# Patient Record
Sex: FEMALE | Race: WHITE | NOT HISPANIC OR LATINO | URBAN - METROPOLITAN AREA
[De-identification: names, ages, dates, MRNs, and addresses within clinical notes are randomized per-mention and may not be internally consistent; named-entity substitution may affect disease eponyms.]

---

## 2024-01-11 ENCOUNTER — INPATIENT (INPATIENT)
Facility: HOSPITAL | Age: 74
LOS: 4 days | Discharge: HOME | DRG: 71 | End: 2024-01-16
Attending: STUDENT IN AN ORGANIZED HEALTH CARE EDUCATION/TRAINING PROGRAM | Admitting: STUDENT IN AN ORGANIZED HEALTH CARE EDUCATION/TRAINING PROGRAM
Payer: MEDICARE

## 2024-01-11 VITALS — DIASTOLIC BLOOD PRESSURE: 100 MMHG | SYSTOLIC BLOOD PRESSURE: 140 MMHG | HEART RATE: 98 BPM | RESPIRATION RATE: 22 BRPM

## 2024-01-11 DIAGNOSIS — R41.82 ALTERED MENTAL STATUS, UNSPECIFIED: ICD-10-CM

## 2024-01-11 LAB
ACANTHOCYTES BLD QL SMEAR: SLIGHT — SIGNIFICANT CHANGE UP
ACANTHOCYTES BLD QL SMEAR: SLIGHT — SIGNIFICANT CHANGE UP
ALBUMIN SERPL ELPH-MCNC: 4.4 G/DL — SIGNIFICANT CHANGE UP (ref 3.3–5.2)
ALBUMIN SERPL ELPH-MCNC: 4.4 G/DL — SIGNIFICANT CHANGE UP (ref 3.3–5.2)
ALP SERPL-CCNC: 93 U/L — SIGNIFICANT CHANGE UP (ref 40–120)
ALP SERPL-CCNC: 93 U/L — SIGNIFICANT CHANGE UP (ref 40–120)
ALT FLD-CCNC: 16 U/L — SIGNIFICANT CHANGE UP
ALT FLD-CCNC: 16 U/L — SIGNIFICANT CHANGE UP
ANION GAP SERPL CALC-SCNC: 16 MMOL/L — SIGNIFICANT CHANGE UP (ref 5–17)
ANION GAP SERPL CALC-SCNC: 16 MMOL/L — SIGNIFICANT CHANGE UP (ref 5–17)
ANISOCYTOSIS BLD QL: SLIGHT — SIGNIFICANT CHANGE UP
ANISOCYTOSIS BLD QL: SLIGHT — SIGNIFICANT CHANGE UP
APPEARANCE UR: CLEAR — SIGNIFICANT CHANGE UP
APPEARANCE UR: CLEAR — SIGNIFICANT CHANGE UP
APTT BLD: 22.5 SEC — LOW (ref 24.5–35.6)
APTT BLD: 22.5 SEC — LOW (ref 24.5–35.6)
AST SERPL-CCNC: 19 U/L — SIGNIFICANT CHANGE UP
AST SERPL-CCNC: 19 U/L — SIGNIFICANT CHANGE UP
BACTERIA # UR AUTO: NEGATIVE /HPF — SIGNIFICANT CHANGE UP
BACTERIA # UR AUTO: NEGATIVE /HPF — SIGNIFICANT CHANGE UP
BASOPHILS # BLD AUTO: 0 K/UL — SIGNIFICANT CHANGE UP (ref 0–0.2)
BASOPHILS # BLD AUTO: 0 K/UL — SIGNIFICANT CHANGE UP (ref 0–0.2)
BASOPHILS # BLD AUTO: 0.02 K/UL — SIGNIFICANT CHANGE UP (ref 0–0.2)
BASOPHILS # BLD AUTO: 0.02 K/UL — SIGNIFICANT CHANGE UP (ref 0–0.2)
BASOPHILS NFR BLD AUTO: 0 % — SIGNIFICANT CHANGE UP (ref 0–2)
BASOPHILS NFR BLD AUTO: 0 % — SIGNIFICANT CHANGE UP (ref 0–2)
BASOPHILS NFR BLD AUTO: 0.2 % — SIGNIFICANT CHANGE UP (ref 0–2)
BASOPHILS NFR BLD AUTO: 0.2 % — SIGNIFICANT CHANGE UP (ref 0–2)
BILIRUB SERPL-MCNC: 0.3 MG/DL — LOW (ref 0.4–2)
BILIRUB SERPL-MCNC: 0.3 MG/DL — LOW (ref 0.4–2)
BILIRUB UR-MCNC: NEGATIVE — SIGNIFICANT CHANGE UP
BILIRUB UR-MCNC: NEGATIVE — SIGNIFICANT CHANGE UP
BLD GP AB SCN SERPL QL: SIGNIFICANT CHANGE UP
BLD GP AB SCN SERPL QL: SIGNIFICANT CHANGE UP
BUN SERPL-MCNC: 11.3 MG/DL — SIGNIFICANT CHANGE UP (ref 8–20)
BUN SERPL-MCNC: 11.3 MG/DL — SIGNIFICANT CHANGE UP (ref 8–20)
CALCIUM SERPL-MCNC: 9.7 MG/DL — SIGNIFICANT CHANGE UP (ref 8.4–10.5)
CALCIUM SERPL-MCNC: 9.7 MG/DL — SIGNIFICANT CHANGE UP (ref 8.4–10.5)
CAST: 0 /LPF — SIGNIFICANT CHANGE UP (ref 0–4)
CAST: 0 /LPF — SIGNIFICANT CHANGE UP (ref 0–4)
CHLORIDE SERPL-SCNC: 98 MMOL/L — SIGNIFICANT CHANGE UP (ref 96–108)
CHLORIDE SERPL-SCNC: 98 MMOL/L — SIGNIFICANT CHANGE UP (ref 96–108)
CK MB CFR SERPL CALC: 11.8 NG/ML — HIGH (ref 0–6.7)
CK MB CFR SERPL CALC: 11.8 NG/ML — HIGH (ref 0–6.7)
CK SERPL-CCNC: 208 U/L — HIGH (ref 25–170)
CK SERPL-CCNC: 208 U/L — HIGH (ref 25–170)
CO2 SERPL-SCNC: 21 MMOL/L — LOW (ref 22–29)
CO2 SERPL-SCNC: 21 MMOL/L — LOW (ref 22–29)
COLOR SPEC: YELLOW — SIGNIFICANT CHANGE UP
COLOR SPEC: YELLOW — SIGNIFICANT CHANGE UP
CREAT SERPL-MCNC: 0.65 MG/DL — SIGNIFICANT CHANGE UP (ref 0.5–1.3)
CREAT SERPL-MCNC: 0.65 MG/DL — SIGNIFICANT CHANGE UP (ref 0.5–1.3)
DIFF PNL FLD: NEGATIVE — SIGNIFICANT CHANGE UP
DIFF PNL FLD: NEGATIVE — SIGNIFICANT CHANGE UP
EGFR: 92 ML/MIN/1.73M2 — SIGNIFICANT CHANGE UP
EGFR: 92 ML/MIN/1.73M2 — SIGNIFICANT CHANGE UP
EOSINOPHIL # BLD AUTO: 0 K/UL — SIGNIFICANT CHANGE UP (ref 0–0.5)
EOSINOPHIL NFR BLD AUTO: 0 % — SIGNIFICANT CHANGE UP (ref 0–6)
ETHANOL SERPL-MCNC: <10 MG/DL — SIGNIFICANT CHANGE UP (ref 0–9)
ETHANOL SERPL-MCNC: <10 MG/DL — SIGNIFICANT CHANGE UP (ref 0–9)
GLUCOSE BLDC GLUCOMTR-MCNC: 113 MG/DL — HIGH (ref 70–99)
GLUCOSE BLDC GLUCOMTR-MCNC: 113 MG/DL — HIGH (ref 70–99)
GLUCOSE SERPL-MCNC: 203 MG/DL — HIGH (ref 70–99)
GLUCOSE SERPL-MCNC: 203 MG/DL — HIGH (ref 70–99)
GLUCOSE UR QL: >=1000 MG/DL
GLUCOSE UR QL: >=1000 MG/DL
HCT VFR BLD CALC: 33.5 % — LOW (ref 34.5–45)
HCT VFR BLD CALC: 33.5 % — LOW (ref 34.5–45)
HCT VFR BLD CALC: 34.2 % — LOW (ref 34.5–45)
HCT VFR BLD CALC: 34.2 % — LOW (ref 34.5–45)
HGB BLD-MCNC: 10.1 G/DL — LOW (ref 11.5–15.5)
HGB BLD-MCNC: 10.1 G/DL — LOW (ref 11.5–15.5)
HGB BLD-MCNC: 9.7 G/DL — LOW (ref 11.5–15.5)
HGB BLD-MCNC: 9.7 G/DL — LOW (ref 11.5–15.5)
IMM GRANULOCYTES NFR BLD AUTO: 0.4 % — SIGNIFICANT CHANGE UP (ref 0–0.9)
IMM GRANULOCYTES NFR BLD AUTO: 0.4 % — SIGNIFICANT CHANGE UP (ref 0–0.9)
INR BLD: 0.93 RATIO — SIGNIFICANT CHANGE UP (ref 0.85–1.18)
INR BLD: 0.93 RATIO — SIGNIFICANT CHANGE UP (ref 0.85–1.18)
KETONES UR-MCNC: 40 MG/DL
KETONES UR-MCNC: 40 MG/DL
LACTATE BLDV-MCNC: 2 MMOL/L — SIGNIFICANT CHANGE UP (ref 0.5–2)
LACTATE BLDV-MCNC: 2 MMOL/L — SIGNIFICANT CHANGE UP (ref 0.5–2)
LACTATE BLDV-MCNC: 5.6 MMOL/L — CRITICAL HIGH (ref 0.5–2)
LACTATE BLDV-MCNC: 5.6 MMOL/L — CRITICAL HIGH (ref 0.5–2)
LEUKOCYTE ESTERASE UR-ACNC: NEGATIVE — SIGNIFICANT CHANGE UP
LEUKOCYTE ESTERASE UR-ACNC: NEGATIVE — SIGNIFICANT CHANGE UP
LYMPHOCYTES # BLD AUTO: 0.82 K/UL — LOW (ref 1–3.3)
LYMPHOCYTES # BLD AUTO: 0.82 K/UL — LOW (ref 1–3.3)
LYMPHOCYTES # BLD AUTO: 1.25 K/UL — SIGNIFICANT CHANGE UP (ref 1–3.3)
LYMPHOCYTES # BLD AUTO: 1.25 K/UL — SIGNIFICANT CHANGE UP (ref 1–3.3)
LYMPHOCYTES # BLD AUTO: 12.4 % — LOW (ref 13–44)
LYMPHOCYTES # BLD AUTO: 12.4 % — LOW (ref 13–44)
LYMPHOCYTES # BLD AUTO: 9.9 % — LOW (ref 13–44)
LYMPHOCYTES # BLD AUTO: 9.9 % — LOW (ref 13–44)
MANUAL SMEAR VERIFICATION: SIGNIFICANT CHANGE UP
MANUAL SMEAR VERIFICATION: SIGNIFICANT CHANGE UP
MCHC RBC-ENTMCNC: 20.7 PG — LOW (ref 27–34)
MCHC RBC-ENTMCNC: 20.7 PG — LOW (ref 27–34)
MCHC RBC-ENTMCNC: 21.1 PG — LOW (ref 27–34)
MCHC RBC-ENTMCNC: 21.1 PG — LOW (ref 27–34)
MCHC RBC-ENTMCNC: 29 GM/DL — LOW (ref 32–36)
MCHC RBC-ENTMCNC: 29 GM/DL — LOW (ref 32–36)
MCHC RBC-ENTMCNC: 29.5 GM/DL — LOW (ref 32–36)
MCHC RBC-ENTMCNC: 29.5 GM/DL — LOW (ref 32–36)
MCV RBC AUTO: 71.4 FL — LOW (ref 80–100)
MICROCYTES BLD QL: SLIGHT — SIGNIFICANT CHANGE UP
MICROCYTES BLD QL: SLIGHT — SIGNIFICANT CHANGE UP
MONOCYTES # BLD AUTO: 0.18 K/UL — SIGNIFICANT CHANGE UP (ref 0–0.9)
MONOCYTES # BLD AUTO: 0.18 K/UL — SIGNIFICANT CHANGE UP (ref 0–0.9)
MONOCYTES # BLD AUTO: 0.46 K/UL — SIGNIFICANT CHANGE UP (ref 0–0.9)
MONOCYTES # BLD AUTO: 0.46 K/UL — SIGNIFICANT CHANGE UP (ref 0–0.9)
MONOCYTES NFR BLD AUTO: 1.8 % — LOW (ref 2–14)
MONOCYTES NFR BLD AUTO: 1.8 % — LOW (ref 2–14)
MONOCYTES NFR BLD AUTO: 5.5 % — SIGNIFICANT CHANGE UP (ref 2–14)
MONOCYTES NFR BLD AUTO: 5.5 % — SIGNIFICANT CHANGE UP (ref 2–14)
NEUTROPHILS # BLD AUTO: 6.99 K/UL — SIGNIFICANT CHANGE UP (ref 1.8–7.4)
NEUTROPHILS # BLD AUTO: 6.99 K/UL — SIGNIFICANT CHANGE UP (ref 1.8–7.4)
NEUTROPHILS # BLD AUTO: 8.21 K/UL — HIGH (ref 1.8–7.4)
NEUTROPHILS # BLD AUTO: 8.21 K/UL — HIGH (ref 1.8–7.4)
NEUTROPHILS NFR BLD AUTO: 81.4 % — HIGH (ref 43–77)
NEUTROPHILS NFR BLD AUTO: 81.4 % — HIGH (ref 43–77)
NEUTROPHILS NFR BLD AUTO: 84 % — HIGH (ref 43–77)
NEUTROPHILS NFR BLD AUTO: 84 % — HIGH (ref 43–77)
NITRITE UR-MCNC: NEGATIVE — SIGNIFICANT CHANGE UP
NITRITE UR-MCNC: NEGATIVE — SIGNIFICANT CHANGE UP
OVALOCYTES BLD QL SMEAR: SLIGHT — SIGNIFICANT CHANGE UP
OVALOCYTES BLD QL SMEAR: SLIGHT — SIGNIFICANT CHANGE UP
PH UR: 5.5 — SIGNIFICANT CHANGE UP (ref 5–8)
PH UR: 5.5 — SIGNIFICANT CHANGE UP (ref 5–8)
PLAT MORPH BLD: NORMAL — SIGNIFICANT CHANGE UP
PLAT MORPH BLD: NORMAL — SIGNIFICANT CHANGE UP
PLATELET # BLD AUTO: 255 K/UL — SIGNIFICANT CHANGE UP (ref 150–400)
PLATELET # BLD AUTO: 255 K/UL — SIGNIFICANT CHANGE UP (ref 150–400)
PLATELET # BLD AUTO: 299 K/UL — SIGNIFICANT CHANGE UP (ref 150–400)
PLATELET # BLD AUTO: 299 K/UL — SIGNIFICANT CHANGE UP (ref 150–400)
POIKILOCYTOSIS BLD QL AUTO: SLIGHT — SIGNIFICANT CHANGE UP
POIKILOCYTOSIS BLD QL AUTO: SLIGHT — SIGNIFICANT CHANGE UP
POLYCHROMASIA BLD QL SMEAR: SLIGHT — SIGNIFICANT CHANGE UP
POLYCHROMASIA BLD QL SMEAR: SLIGHT — SIGNIFICANT CHANGE UP
POTASSIUM SERPL-MCNC: 3.6 MMOL/L — SIGNIFICANT CHANGE UP (ref 3.5–5.3)
POTASSIUM SERPL-MCNC: 3.6 MMOL/L — SIGNIFICANT CHANGE UP (ref 3.5–5.3)
POTASSIUM SERPL-SCNC: 3.6 MMOL/L — SIGNIFICANT CHANGE UP (ref 3.5–5.3)
POTASSIUM SERPL-SCNC: 3.6 MMOL/L — SIGNIFICANT CHANGE UP (ref 3.5–5.3)
PROT SERPL-MCNC: 6.7 G/DL — SIGNIFICANT CHANGE UP (ref 6.6–8.7)
PROT SERPL-MCNC: 6.7 G/DL — SIGNIFICANT CHANGE UP (ref 6.6–8.7)
PROT UR-MCNC: NEGATIVE MG/DL — SIGNIFICANT CHANGE UP
PROT UR-MCNC: NEGATIVE MG/DL — SIGNIFICANT CHANGE UP
PROTHROM AB SERPL-ACNC: 10.3 SEC — SIGNIFICANT CHANGE UP (ref 9.5–13)
PROTHROM AB SERPL-ACNC: 10.3 SEC — SIGNIFICANT CHANGE UP (ref 9.5–13)
RBC # BLD: 4.69 M/UL — SIGNIFICANT CHANGE UP (ref 3.8–5.2)
RBC # BLD: 4.69 M/UL — SIGNIFICANT CHANGE UP (ref 3.8–5.2)
RBC # BLD: 4.79 M/UL — SIGNIFICANT CHANGE UP (ref 3.8–5.2)
RBC # BLD: 4.79 M/UL — SIGNIFICANT CHANGE UP (ref 3.8–5.2)
RBC # FLD: 18.9 % — HIGH (ref 10.3–14.5)
RBC BLD AUTO: ABNORMAL
RBC BLD AUTO: ABNORMAL
RBC CASTS # UR COMP ASSIST: 0 /HPF — SIGNIFICANT CHANGE UP (ref 0–4)
RBC CASTS # UR COMP ASSIST: 0 /HPF — SIGNIFICANT CHANGE UP (ref 0–4)
SODIUM SERPL-SCNC: 135 MMOL/L — SIGNIFICANT CHANGE UP (ref 135–145)
SODIUM SERPL-SCNC: 135 MMOL/L — SIGNIFICANT CHANGE UP (ref 135–145)
SP GR SPEC: >1.03 — HIGH (ref 1–1.03)
SP GR SPEC: >1.03 — HIGH (ref 1–1.03)
SQUAMOUS # UR AUTO: 0 /HPF — SIGNIFICANT CHANGE UP (ref 0–5)
SQUAMOUS # UR AUTO: 0 /HPF — SIGNIFICANT CHANGE UP (ref 0–5)
TROPONIN T, HIGH SENSITIVITY RESULT: 14 NG/L — SIGNIFICANT CHANGE UP (ref 0–51)
TROPONIN T, HIGH SENSITIVITY RESULT: 14 NG/L — SIGNIFICANT CHANGE UP (ref 0–51)
TROPONIN T, HIGH SENSITIVITY RESULT: 16 NG/L — SIGNIFICANT CHANGE UP (ref 0–51)
TROPONIN T, HIGH SENSITIVITY RESULT: 16 NG/L — SIGNIFICANT CHANGE UP (ref 0–51)
UROBILINOGEN FLD QL: 0.2 MG/DL — SIGNIFICANT CHANGE UP (ref 0.2–1)
UROBILINOGEN FLD QL: 0.2 MG/DL — SIGNIFICANT CHANGE UP (ref 0.2–1)
VARIANT LYMPHS # BLD: 4.4 % — SIGNIFICANT CHANGE UP (ref 0–6)
VARIANT LYMPHS # BLD: 4.4 % — SIGNIFICANT CHANGE UP (ref 0–6)
WBC # BLD: 10.08 K/UL — SIGNIFICANT CHANGE UP (ref 3.8–10.5)
WBC # BLD: 10.08 K/UL — SIGNIFICANT CHANGE UP (ref 3.8–10.5)
WBC # BLD: 8.32 K/UL — SIGNIFICANT CHANGE UP (ref 3.8–10.5)
WBC # BLD: 8.32 K/UL — SIGNIFICANT CHANGE UP (ref 3.8–10.5)
WBC # FLD AUTO: 10.08 K/UL — SIGNIFICANT CHANGE UP (ref 3.8–10.5)
WBC # FLD AUTO: 10.08 K/UL — SIGNIFICANT CHANGE UP (ref 3.8–10.5)
WBC # FLD AUTO: 8.32 K/UL — SIGNIFICANT CHANGE UP (ref 3.8–10.5)
WBC # FLD AUTO: 8.32 K/UL — SIGNIFICANT CHANGE UP (ref 3.8–10.5)
WBC UR QL: 1 /HPF — SIGNIFICANT CHANGE UP (ref 0–5)
WBC UR QL: 1 /HPF — SIGNIFICANT CHANGE UP (ref 0–5)

## 2024-01-11 PROCEDURE — 99223 1ST HOSP IP/OBS HIGH 75: CPT

## 2024-01-11 PROCEDURE — 70496 CT ANGIOGRAPHY HEAD: CPT | Mod: 26,MA

## 2024-01-11 PROCEDURE — 93306 TTE W/DOPPLER COMPLETE: CPT | Mod: 26

## 2024-01-11 PROCEDURE — 70498 CT ANGIOGRAPHY NECK: CPT | Mod: 26,MA

## 2024-01-11 PROCEDURE — 99285 EMERGENCY DEPT VISIT HI MDM: CPT | Mod: GC

## 2024-01-11 PROCEDURE — 70450 CT HEAD/BRAIN W/O DYE: CPT | Mod: 26,MA,XU

## 2024-01-11 PROCEDURE — 93010 ELECTROCARDIOGRAM REPORT: CPT | Mod: 76

## 2024-01-11 PROCEDURE — 0042T: CPT | Mod: MA

## 2024-01-11 RX ORDER — SODIUM CHLORIDE 9 MG/ML
1000 INJECTION, SOLUTION INTRAVENOUS
Refills: 0 | Status: DISCONTINUED | OUTPATIENT
Start: 2024-01-11 | End: 2024-01-16

## 2024-01-11 RX ORDER — DEXTROSE 50 % IN WATER 50 %
12.5 SYRINGE (ML) INTRAVENOUS ONCE
Refills: 0 | Status: DISCONTINUED | OUTPATIENT
Start: 2024-01-11 | End: 2024-01-16

## 2024-01-11 RX ORDER — ARIPIPRAZOLE 15 MG/1
30 TABLET ORAL DAILY
Refills: 0 | Status: DISCONTINUED | OUTPATIENT
Start: 2024-01-11 | End: 2024-01-16

## 2024-01-11 RX ORDER — GLUCAGON INJECTION, SOLUTION 0.5 MG/.1ML
1 INJECTION, SOLUTION SUBCUTANEOUS ONCE
Refills: 0 | Status: DISCONTINUED | OUTPATIENT
Start: 2024-01-11 | End: 2024-01-16

## 2024-01-11 RX ORDER — DEXTROSE 50 % IN WATER 50 %
25 SYRINGE (ML) INTRAVENOUS ONCE
Refills: 0 | Status: DISCONTINUED | OUTPATIENT
Start: 2024-01-11 | End: 2024-01-16

## 2024-01-11 RX ORDER — MIDAZOLAM HYDROCHLORIDE 1 MG/ML
5 INJECTION, SOLUTION INTRAMUSCULAR; INTRAVENOUS ONCE
Refills: 0 | Status: DISCONTINUED | OUTPATIENT
Start: 2024-01-11 | End: 2024-01-11

## 2024-01-11 RX ORDER — ATORVASTATIN CALCIUM 80 MG/1
40 TABLET, FILM COATED ORAL AT BEDTIME
Refills: 0 | Status: DISCONTINUED | OUTPATIENT
Start: 2024-01-11 | End: 2024-01-11

## 2024-01-11 RX ORDER — INSULIN LISPRO 100/ML
VIAL (ML) SUBCUTANEOUS
Refills: 0 | Status: DISCONTINUED | OUTPATIENT
Start: 2024-01-11 | End: 2024-01-16

## 2024-01-11 RX ORDER — AMLODIPINE BESYLATE 2.5 MG/1
5 TABLET ORAL DAILY
Refills: 0 | Status: DISCONTINUED | OUTPATIENT
Start: 2024-01-11 | End: 2024-01-16

## 2024-01-11 RX ORDER — ENOXAPARIN SODIUM 100 MG/ML
40 INJECTION SUBCUTANEOUS EVERY 24 HOURS
Refills: 0 | Status: DISCONTINUED | OUTPATIENT
Start: 2024-01-11 | End: 2024-01-16

## 2024-01-11 RX ORDER — ASPIRIN/CALCIUM CARB/MAGNESIUM 324 MG
81 TABLET ORAL DAILY
Refills: 0 | Status: DISCONTINUED | OUTPATIENT
Start: 2024-01-11 | End: 2024-01-16

## 2024-01-11 RX ORDER — DEXTROSE 50 % IN WATER 50 %
15 SYRINGE (ML) INTRAVENOUS ONCE
Refills: 0 | Status: DISCONTINUED | OUTPATIENT
Start: 2024-01-11 | End: 2024-01-16

## 2024-01-11 RX ORDER — PERPHENAZINE 8 MG/1
8 TABLET, FILM COATED ORAL AT BEDTIME
Refills: 0 | Status: DISCONTINUED | OUTPATIENT
Start: 2024-01-11 | End: 2024-01-16

## 2024-01-11 RX ORDER — ATORVASTATIN CALCIUM 80 MG/1
80 TABLET, FILM COATED ORAL AT BEDTIME
Refills: 0 | Status: DISCONTINUED | OUTPATIENT
Start: 2024-01-11 | End: 2024-01-16

## 2024-01-11 RX ADMIN — PERPHENAZINE 8 MILLIGRAM(S): 8 TABLET, FILM COATED ORAL at 22:06

## 2024-01-11 RX ADMIN — ENOXAPARIN SODIUM 40 MILLIGRAM(S): 100 INJECTION SUBCUTANEOUS at 22:05

## 2024-01-11 RX ADMIN — ATORVASTATIN CALCIUM 80 MILLIGRAM(S): 80 TABLET, FILM COATED ORAL at 22:06

## 2024-01-11 RX ADMIN — MIDAZOLAM HYDROCHLORIDE 5 MILLIGRAM(S): 1 INJECTION, SOLUTION INTRAMUSCULAR; INTRAVENOUS at 10:24

## 2024-01-11 NOTE — H&P ADULT - NSHPREVIEWOFSYSTEMS_GEN_ALL_CORE
CONSTITUTIONAL: no fevers, chills, night sweats, weight changes  HEENT: no vision changes or diplopia, no tinnitus, no sore throat  RESPIRATORY: denies dyspnea, sob, nocturnal cough, sputum or hemoptysis  CARDIOVASCULAR: no CP, palpitations or lower extremity edema  GI: no dysphagia, nausea, abd pain, constipation, diarrhea, stool change or blood in stool  : no dysuria or hematuria, no flank pain, no incontinence or urinary retention  MSK: no joint pain or swelling  INTEGUMENTARY: no rashes or lesions  NEUROLOGICAL: no HA, +confusion, +syncope, no numbness, weakness, tremors or ataxia  PSYCH: denies depression  ENDOCRINE: no polyuria, polydipsia, no temp intolerance, no tremors, no changes in skin, hair or nails  HEMATOLOGIC/LYMPHATIC: no lymph node enlargement, abnormal bruising or bleeding

## 2024-01-11 NOTE — ED ADULT TRIAGE NOTE - CHIEF COMPLAINT QUOTE
pt BIBA s/p fall, pt with garbled speech and AMS on arrival, EMS reporting pt was witnessed normal at 8:50 this morning when she suddenly became unable to speak, fell down and passed out. code stroke activated on arrival.

## 2024-01-11 NOTE — PATIENT PROFILE ADULT - FALL HARM RISK - HARM RISK INTERVENTIONS
Assistance with ambulation/Assistance OOB with selected safe patient handling equipment/Communicate Risk of Fall with Harm to all staff/Reinforce activity limits and safety measures with patient and family/Tailored Fall Risk Interventions/Visual Cue: Yellow wristband and red socks/Bed in lowest position, wheels locked, appropriate side rails in place/Call bell, personal items and telephone in reach/Instruct patient to call for assistance before getting out of bed or chair/Non-slip footwear when patient is out of bed/[place] to call system/Physically safe environment - no spills, clutter or unnecessary equipment/Purposeful Proactive Rounding/Room/bathroom lighting operational, light cord in reach Assistance with ambulation/Assistance OOB with selected safe patient handling equipment/Communicate Risk of Fall with Harm to all staff/Reinforce activity limits and safety measures with patient and family/Tailored Fall Risk Interventions/Visual Cue: Yellow wristband and red socks/Bed in lowest position, wheels locked, appropriate side rails in place/Call bell, personal items and telephone in reach/Instruct patient to call for assistance before getting out of bed or chair/Non-slip footwear when patient is out of bed/[place] to call system/Physically safe environment - no spills, clutter or unnecessary equipment/Purposeful Proactive Rounding/Room/bathroom lighting operational, light cord in reach

## 2024-01-11 NOTE — PATIENT PROFILE ADULT - FALL HARM RISK - DATE OF FALL
F: No IVF  N: DASH/TLC/DM diet  E: Replete lytes PRN K<4, Mg<2  P: DVT PPX: on Eliquis  C: FULL CODE  Dispo: Admit to tele [facility]
11-Jan-2024

## 2024-01-11 NOTE — ED PROVIDER NOTE - NSICDXPASTMEDICALHX_GEN_ALL_CORE_FT
PAST MEDICAL HISTORY:  DM (diabetes mellitus)     HLD (hyperlipidemia)     HTN (hypertension)     Manic depression

## 2024-01-11 NOTE — PATIENT PROFILE ADULT - FUNCTIONAL ASSESSMENT - BASIC MOBILITY 6.
3-calculated by average/Not able to assess (calculate score using [facility] averaging method)  3-calculated by average/Not able to assess (calculate score using [facility] averaging method)

## 2024-01-11 NOTE — ED ADULT NURSE NOTE - OBJECTIVE STATEMENT
BIBA after collapse while cooking. Pt lethargic and not following directions. Speech slurred. To CT Scan.

## 2024-01-11 NOTE — H&P ADULT - ASSESSMENT
74 y/o F w/ PMH of bipolar dx vs schizo affective disorder, HTN, HLD, DMII presented after pt collapsed in the kitchen.  She was unresponsive on the floor for a few seconds and upon awakening was noted to be confused with garbled speech.  Pt is a poor historian.  As per ED records, pts daughter reports that she has been having episodes of garbled speech for the past week.  Code stroke called in ED.  CTH negative, CTA h/n and perfusion negative.  Pt admitted for r/o CVA vs seizure.         ?Seizure vs syncope vs r/o CVA   - Given reports of possible urinary incontinence, possible post ictal state s/p collapse, lactic acidosis and +CPK will order cvEEG to assess for seizures    - Garbled speech could be due to CVA however CTH, CTA h/n and perfusion study negative   - MRI brain ordered to r/o CVA   - c/w asa, statin and f/u A1c and lipid panel   - Will order orthostats given pt was standing when she collapsed   - No prodrome to suggest cardiac syncope however pt is a poor historian so will monitor on tele and order TTE   - Seizure, fall and aspiration precautions   - Q4h VS and neuro checks   - Neuro stroke following       Microcytic anemia   - Baseline h/h unknown   - Pt hemodynamically stable and no active bleeding noted  - Will order anemia panel   - Monitor cbc and transfuse as needed       DM II  - f/u A1c for AM  - Hold po meds while hospitalized  - ISS and hypoglycemic protocol in place   - If needed add basal and or bolus insulin regimen       HTN / HLD  - c/w home meds       ?Bipolar vs Schizophrenia/affective disorder   - c/w home meds       VTE ppx: lovenox  72 y/o F w/ PMH of bipolar dx vs schizo affective disorder, HTN, HLD, DMII presented after pt collapsed in the kitchen.  She was unresponsive on the floor for a few seconds and upon awakening was noted to be confused with garbled speech.  Pt is a poor historian.  As per ED records, pts daughter reports that she has been having episodes of garbled speech for the past week.  Code stroke called in ED.  CTH negative, CTA h/n and perfusion negative.  Pt admitted for r/o CVA vs seizure.         ?Seizure vs syncope vs r/o CVA   - Given reports of possible urinary incontinence, possible post ictal state s/p collapse, lactic acidosis and +CPK will order cvEEG to assess for seizures    - Garbled speech could be due to CVA however CTH, CTA h/n and perfusion study negative   - MRI brain ordered to r/o CVA   - c/w asa, statin and f/u A1c and lipid panel   - Will order orthostats given pt was standing when she collapsed   - No prodrome to suggest cardiac syncope and trops (-) however pt is a poor historian so will monitor on tele and order TTE   - Lactic acidosis resolved w/out intervention   - Seizure, fall and aspiration precautions   - Q4h VS and neuro checks   - Neuro stroke following       Microcytic anemia   - Baseline h/h unknown   - Pt hemodynamically stable and no active bleeding noted  - Will order anemia panel   - Monitor cbc and transfuse as needed       DM II  - f/u A1c for AM  - Hold po meds while hospitalized  - ISS and hypoglycemic protocol in place   - If needed add basal and or bolus insulin regimen       HTN / HLD  - c/w home meds       ?Bipolar vs Schizophrenia/affective disorder   - c/w home meds       VTE ppx: lovenox

## 2024-01-11 NOTE — PATIENT PROFILE ADULT - LIVING ENVIRONMENT
Problem: Pain  Goal: #Acceptable pain/comfort level is achieved/maintained at rest (based on self report using Numeric Rating Scales/Faces  Outcome: Outcome Met, Continue evaluating goal progress toward completion  Pain controlled with PRN percocet.     Problem: Angina/Chest Pain  Goal: Achieves Chest Pain Control (Pain Score = 0-1, no episodes)  Chest pain control = Pain Score = 0-1, no episodes of pain   Outcome: Outcome Met, Continue evaluating goal progress toward completion  No complaints of chest pain. Chest pressure/breast pain has gradually improved throughout shift.        no

## 2024-01-11 NOTE — CONSULT NOTE ADULT - CRITICAL CARE ATTENDING COMMENT
Stroke code called, pt emergently assessed  Out of TNK window as daughter confirmed dysarthria has been ongoing for at least 1 week, concern for recent stroke vs progression of infarct  ?Unclear if R hemiparesis is new, Noted to have elevated lactate, would r/o seizure and post ictal Todds paralysis  DAPT x 60 days for ICAD  Rest of plan as above    Bren Carlin,   Vascular Neurology  Office [phone] Stroke code called, pt emergently assessed  Out of TNK window as daughter confirmed dysarthria has been ongoing for at least 1 week, concern for recent stroke vs progression of infarct  ?Unclear if R hemiparesis is new, Noted to have elevated lactate, would r/o seizure and post ictal Todds paralysis  DAPT x 60 days for ICAD  Rest of plan as above    Bren Carlin,   Vascular Neurology  Office [phone]

## 2024-01-11 NOTE — ED PROVIDER NOTE - ATTENDING CONTRIBUTION TO CARE
Pt is a 72 yo F BIBEMS from home for collapse and confusion.  Pt was at home when EMS reports that patient collapsed and [facility] called EMS. on arrival, EMS reports that patient was unresponsive and hypotensive. Pt became more alert en route and was agitated and confused.    physical - rrr. ctab. abd - soft, nt. no edema. no rash.  neuro - moving all extremities. confused. opens eyes. dysarthric confused speech.    plan - labs and imaging reviewed. Pt given IV versed for treatment of agitation to get CTs done.  code stroke on arrival. neurology team evaluated patient at bedside.  pt's mental status was improving in ED but still had mild confusion. patient's daughter reports patient's speech has been off for 1 wk.  will admit to medicine for further workup. Pt is a 74 yo F BIBEMS from home for collapse and confusion.  Pt was at home when EMS reports that patient collapsed and [facility] called EMS. on arrival, EMS reports that patient was unresponsive and hypotensive. Pt became more alert en route and was agitated and confused.    physical - rrr. ctab. abd - soft, nt. no edema. no rash.  neuro - moving all extremities. confused. opens eyes. dysarthric confused speech.    plan - labs and imaging reviewed. Pt given IV versed for treatment of agitation to get CTs done.  code stroke on arrival. neurology team evaluated patient at bedside.  pt's mental status was improving in ED but still had mild confusion. patient's daughter reports patient's speech has been off for 1 wk.  will admit to medicine for further workup.

## 2024-01-11 NOTE — ED PROVIDER NOTE - CLINICAL SUMMARY MEDICAL DECISION MAKING FREE TEXT BOX
74yo F with PMHx of DM who presents to the ED complaining of aphasia and agitation. Code stroke called. Daughter en route, will prepare for TNK once medical history and consent obtained from daughter. 72yo F with PMHx of DM who presents to the ED complaining of aphasia and agitation. Code stroke called. Daughter en route, will prepare for TNK once medical history and consent obtained from daughter.    Patient mental status slowly improving, CTs negative, further history obtained from daughter indicates she has been having similar episodes for a week. Will admit for MRI and EEG, further workup. No TNK given.

## 2024-01-11 NOTE — CHART NOTE - NSCHARTNOTEFT_GEN_A_CORE
EEG preliminary read (not final) on the initial recording hour(s) = x 1     No seizures recorded.  Final report to follow tomorrow morning after completion of study.    [facility] EEG Reading Room Ph#: [phone]  Epilepsy Answering Service after 5PM and before 8:30AM: Ph#: [phone] EEG preliminary read (not final) on the initial recording hour(s) = x 1     No seizures recorded.  Final report to follow tomorrow morning after completion of study.    [facility] EEG Reading Room Ph#: [phone]  Epilepsy Answering Service after 5PM and before 8:30AM: Ph#: [phone]

## 2024-01-11 NOTE — H&P ADULT - HISTORY OF PRESENT ILLNESS
72 y/o F w/ PMH of bipolar dx vs schizo affective disorder, HTN, HLD, DMII presented after pt collapsed in the kitchen.  She was unresponsive on the floor for a few seconds and upon awakening was noted to be confused with garbled speech.  Pt is a poor historian and does not remember what happened that caused her to come to hospital.  Pt does remember waking up on the floor of the kitchen but nothing further.  She has never had anything like this before.  As per ED records, pts daughter reports that she has been having episodes of garbled speech for the past week.  She has no substance abuse history and has not consumed etoh since 2008.  She denies prodrome of diaphoresis, palpitations, cp, sob.  Pt denies vision or hearing changes. 74 y/o F w/ PMH of bipolar dx vs schizo affective disorder, HTN, HLD, DMII presented after pt collapsed in the kitchen.  She was unresponsive on the floor for a few seconds and upon awakening was noted to be confused with garbled speech.  Pt is a poor historian and does not remember what happened that caused her to come to hospital.  Pt does remember waking up on the floor of the kitchen but nothing further.  She has never had anything like this before.  As per ED records, pts daughter reports that she has been having episodes of garbled speech for the past week.  She has no substance abuse history and has not consumed etoh since 2008.  She denies prodrome of diaphoresis, palpitations, cp, sob.  Pt denies vision or hearing changes.

## 2024-01-11 NOTE — ED ADULT TRIAGE NOTE - BP NONINVASIVE SYSTOLIC (MM HG)
[FreeTextEntry1] : Follow up Paperwork. [de-identified] :  65yo F presents for paperwork and immunity status testing. Pt reports she also started mounjaro, currently on 7.5mg and is happy with her weight loss. Pt has no negative side effects.  140

## 2024-01-11 NOTE — CONSULT NOTE ADULT - SUBJECTIVE AND OBJECTIVE BOX
Preliminary note, official note pending attending review/signature.                               [facility] Stroke Team  CC:  HPI:  The patient is a 73y Female, unknown PMHx, who presented s/p witnessed fall with garbled speech and altered mental status. Per EMS, the pt was making breakfast when she suddenly became unable to speak, fell down, ?syncopized, and the home health aide called 911. Per the home health aide, pt's LKW 0850 this morning. Pt initially presented to the ED completely aphasic w/ only moaning and no intelligible speech, but moving all extremities, and R facial droop. At the time of this exam, the pt had been given sedation due to agitation, but had neurologically improved since arrival. After speaking with the pt's daughter, it was established that the patient had had these symptoms for ~1 week.     Stroke risk factors:  Unknown    PAST MEDICAL & SURGICAL HISTORY:  SERA    MEDICATIONS  (STANDING):    MEDICATIONS  (PRN):      Allergies    Allergy Status Unknown    Intolerances        SOCIAL HISTORY:  no tob,   no alcohol   no drugs    FAMILY HISTORY:        ROS: 14 point ROS negative other than what is present in HPI or below    Vital Signs Last 24 Hrs  T(C): 35.4 (11 Jan 2024 10:28), Max: 35.4 (11 Jan 2024 10:28)  T(F): 95.8 (11 Jan 2024 10:28), Max: 95.8 (11 Jan 2024 10:28)  HR: 87 (11 Jan 2024 10:28) (87 - 98)  BP: 139/64 (11 Jan 2024 10:28) (135/65 - 140/100)  BP(mean): --  RR: 24 (11 Jan 2024 10:28) (20 - 24)  SpO2: 97% (11 Jan 2024 10:28) (97% - 100%)    Parameters below as of 11 Jan 2024 10:28  Patient On (Oxygen Delivery Method): room air          General: NAD    Detailed Neurologic Exam:    Mental status: The patient is awake and alert and has normal attention span.  The patient is fully oriented in 3 spheres. The patient is oriented to current events. The patient is able to name objects, follow commands, repeat sentences.    Cranial nerves: Pupils equal and react symmetrically to light. There is no visual field deficit to confrontation. Extraocular motion is full with no nystagmus. There is no ptosis. Facial sensation is intact. Facial musculature is symmetric. Palate elevates symmetrically. Tongue is midline.    Motor: There is normal bulk and tone.  There is no tremor.  Strength is 5/5 in the right arm and leg.   Strength is 5/5 in the left arm and leg.    Sensation: Intact to light touch and pin in 4 extremities    Reflexes: 1-2+ throughout and plantar responses are flexor.    Cerebellar: There is no dysmetria on finger to nose testing.    Gait : deferred    [facility] SS:  DATE: 1/11/24  TIME: 10:30  1A: Level of consciousness (0-3): 1  1B: Questions (0-2): 1  1C: Commands (0-2): 0  2: Gaze (0-2): 0  3: Visual fields (0-3): 0  4: Facial palsy (0-3): 1  MOTOR:  5A: Left arm motor drift (0-4): 0  5B: Right arm motor drift (0-4): 1  6A: Left leg motor drift (0-4): 1  6B: Right leg motor drift (0-4): 1  7: Limb ataxia (0-2): 0  SENSORY:  8: Sensation (0-2): 0  SPEECH:  9: Language (0-3): 0  10: Dysarthria (0-2): 1  EXTINCTION:  11: Extinction/inattention (0-2): 0    TOTAL SCORE: 7    prehospital mRS=      LABS:                         9.7    10.08 )-----------( 299      ( 11 Jan 2024 09:43 )             33.5       01-11    135  |  98  |  11.3  ----------------------------<  203<H>  3.6   |  21.0<L>  |  0.65    Ca    9.7      11 Jan 2024 09:43    TPro  6.7  /  Alb  4.4  /  TBili  0.3<L>  /  DBili  x   /  AST  19  /  ALT  16  /  AlkPhos  93  01-11      PT/INR - ( 11 Jan 2024 09:43 )   PT: 10.3 sec;   INR: 0.93 ratio         PTT - ( 11 Jan 2024 09:43 )  PTT:22.5 sec    Lipid panel:    HgbA1c:      RADIOLOGY & ADDITIONAL STUDIES (independently reviewed unless otherwise noted):  CT head:  CTA head: no aneurysm, AVM, LVO or sig stenosis in COW  CTA neck: no sig carotid or vertebral stenosis  CT Perfusion head - CBF<30% volume 0ml, Tmax>6s volume =0ml Preliminary note, official note pending attending review/signature.                               [facility] Stroke Team  CC:  HPI:  The patient is a 73y Female, unknown PMHx, who presented s/p witnessed fall with garbled speech and altered mental status. Per EMS, the pt was making breakfast when she suddenly became unable to speak, fell down, ?syncopized, and the home health aide called 911. Per the home health aide, pt's LKW 0850 this morning. Pt initially presented to the ED completely aphasic w/ only moaning and no intelligible speech, but moving all extremities, and R facial droop. At the time of this exam, the pt had been given sedation due to agitation, but had neurologically improved since arrival. After speaking with the pt's daughter, it was established that the patient had had these symptoms for ~1 week.     Stroke risk factors:  Unknown    PAST MEDICAL & SURGICAL HISTORY:  SERA    MEDICATIONS  (STANDING):    MEDICATIONS  (PRN):      Allergies    Allergy Status Unknown    Intolerances        SOCIAL HISTORY:  no tob,   no alcohol   no drugs    FAMILY HISTORY:        ROS: 14 point ROS negative other than what is present in HPI or below    Vital Signs Last 24 Hrs  T(C): 35.4 (11 Jan 2024 10:28), Max: 35.4 (11 Jan 2024 10:28)  T(F): 95.8 (11 Jan 2024 10:28), Max: 95.8 (11 Jan 2024 10:28)  HR: 87 (11 Jan 2024 10:28) (87 - 98)  BP: 139/64 (11 Jan 2024 10:28) (135/65 - 140/100)  BP(mean): --  RR: 24 (11 Jan 2024 10:28) (20 - 24)  SpO2: 97% (11 Jan 2024 10:28) (97% - 100%)    Parameters below as of 11 Jan 2024 10:28  Patient On (Oxygen Delivery Method): room air          General: NAD    Detailed Neurologic Exam:    Mental status: The patient is awake and alert and has normal attention span.  The patient is fully oriented in 3 spheres. The patient is oriented to current events. The patient is able to name objects, follow commands, repeat sentences.    Cranial nerves: Pupils equal and react symmetrically to light. There is no visual field deficit to confrontation. Extraocular motion is full with no nystagmus. There is no ptosis. Facial sensation is intact. Facial musculature is symmetric. Palate elevates symmetrically. Tongue is midline.    Motor: There is normal bulk and tone.  There is no tremor.  Strength is 5/5 in the right arm and leg.   Strength is 5/5 in the left arm and leg.    Sensation: Intact to light touch and pin in 4 extremities    Reflexes: 1-2+ throughout and plantar responses are flexor.    Cerebellar: There is no dysmetria on finger to nose testing.    Gait : deferred    [facility] SS:  DATE: 1/11/24  TIME: 10:30  1A: Level of consciousness (0-3): 1  1B: Questions (0-2): 1  1C: Commands (0-2): 0  2: Gaze (0-2): 0  3: Visual fields (0-3): 0  4: Facial palsy (0-3): 1  MOTOR:  5A: Left arm motor drift (0-4): 0  5B: Right arm motor drift (0-4): 1  6A: Left leg motor drift (0-4): 1  6B: Right leg motor drift (0-4): 1  7: Limb ataxia (0-2): 0  SENSORY:  8: Sensation (0-2): 0  SPEECH:  9: Language (0-3): 0  10: Dysarthria (0-2): 1  EXTINCTION:  11: Extinction/inattention (0-2): 0    TOTAL SCORE: 7    prehospital mRS=      LABS:                         9.7    10.08 )-----------( 299      ( 11 Jan 2024 09:43 )             33.5       01-11    135  |  98  |  11.3  ----------------------------<  203<H>  3.6   |  21.0<L>  |  0.65    Ca    9.7      11 Jan 2024 09:43    TPro  6.7  /  Alb  4.4  /  TBili  0.3<L>  /  DBili  x   /  AST  19  /  ALT  16  /  AlkPhos  93  01-11      PT/INR - ( 11 Jan 2024 09:43 )   PT: 10.3 sec;   INR: 0.93 ratio         PTT - ( 11 Jan 2024 09:43 )  PTT:22.5 sec    Lipid panel:    HgbA1c:      RADIOLOGY & ADDITIONAL STUDIES (independently reviewed unless otherwise noted):  CT head:  CTA head: no aneurysm, AVM, LVO or sig stenosis in COW  CTA neck: no sig carotid or vertebral stenosis  CT Perfusion head - CBF<30% volume 0ml, Tmax>6s volume =0ml Preliminary note, official note pending attending review/signature.                               [facility] Stroke Team    CC: s/p fall, garbled speech. altered mental status  HPI:  The patient is a 73y Female, hx diabetes , HTN, ? behavioral health conditions (unclear exactly at this time), who presented s/p witnessed fall with garbled speech with altered mental status. Per EMS, the pt was making breakfast at 0850 1/11/24   when she suddenly became unable to speak, fell down,, and the pt's home health aide called 911. Pt initially presented to the ED completely aphasic w/ only moaning and no intelligible speech, and R facial droop, but moving all extremities. At the time of this consult, the pt had been given Versed IVP for sedation due to agitation. After ED team spoke with the pt's daughter via phone, it was established that the patient had "difficulty speaking" and was confused for ~1 week and LKW was no longer clear.     PAST MEDICAL & SURGICAL HISTORY:  DM  HTN    Medications: pending     Allergies  Allergy Status Unknown    Intolerances  Unknown    SOCIAL HISTORY:  no tobacco   ? ETOH abuse, pt denies  no drugs    FAMILY HISTORY:  pending, unable to obtain       ROS: 14 point ROS negative other than what is present in HPI or below    Vital Signs Last 24 Hrs  T(C): 35.4 (11 Jan 2024 10:28), Max: 35.4 (11 Jan 2024 10:28)  T(F): 95.8 (11 Jan 2024 10:28), Max: 95.8 (11 Jan 2024 10:28)  HR: 87 (11 Jan 2024 10:28) (87 - 98)  BP: 139/64 (11 Jan 2024 10:28) (135/65 - 140/100)  BP(mean): --  RR: 24 (11 Jan 2024 10:28) (20 - 24)  SpO2: 97% (11 Jan 2024 10:28) (97% - 100%)    Parameters below as of 11 Jan 2024 10:28  Patient On (Oxygen Delivery Method): room air      General: NAD , left nasal ecchymosis     Detailed Neurologic Exam:    Mental status: The patient upon arrival was underneath the covers, arouses briskly to voice and removes and becomes somewhat attentive but at times restless, oriented to self and place,   disoriented to time and states the month is April or May. The patient is able to name objects, follow commands, repeat sentences.    Cranial nerves: Dysarthria, Pupils equal and react symmetrically to light. Overall attends to movement all visual fields but breaks fixation frequently,  Extraocular motion is full with no nystagmus. There is no ptosis. Facial sensation is intact. R facial palsy present.      Motor: There is normal bulk and tone.  There is no tremor.  Strength is 4/5 in the right arm. Slight Drift   Strength is 4/5 in the right leg. Slight Drift   Strength is 5/5 in the left arm.  Strength is 4/5 in the left leg. Slight Drift     Sensation: Intact to light touch and pin in 4 extremities    Cerebellar: There is no gross dysmetria appreciated     Gait : deferred    [facility] SS:  DATE: 1/11/24  TIME: 10:30  1A: Level of consciousness (0-3): 1  1B: Questions (0-2): 1  1C: Commands (0-2): 0  2: Gaze (0-2): 0  3: Visual fields (0-3): 0  4: Facial palsy (0-3): 1  MOTOR:  5A: Left arm motor drift (0-4): 0  5B: Right arm motor drift (0-4): 1  6A: Left leg motor drift (0-4): 1  6B: Right leg motor drift (0-4): 1  7: Limb ataxia (0-2): 0  SENSORY:  8: Sensation (0-2): 0  SPEECH:  9: Language (0-3): 0  10: Dysarthria (0-2): 1  EXTINCTION:  11: Extinction/inattention (0-2): 0    TOTAL SCORE: 7    prehospital mRS= 6 (UTD)    LABS:                         9.7    10.08 )-----------( 299      ( 11 Jan 2024 09:43 )             33.5       01-11    135  |  98  |  11.3  ----------------------------<  203<H>  3.6   |  21.0<L>  |  0.65    Ca    9.7      11 Jan 2024 09:43    TPro  6.7  /  Alb  4.4  /  TBili  0.3<L>  /  DBili  x   /  AST  19  /  ALT  16  /  AlkPhos  93  01-11      PT/INR - ( 11 Jan 2024 09:43 )   PT: 10.3 sec;   INR: 0.93 ratio         PTT - ( 11 Jan 2024 09:43 )  PTT:22.5 sec    Lipid panel: pending  HgbA1c: pending      RADIOLOGY & ADDITIONAL STUDIES (independently reviewed unless otherwise noted):  CT Brain, CTA Brain, CTA Neck, CT Brain Perfusion Stroke Protocol w/ IV Cont (01.11.24 @ 10:03)  IMPRESSION:  CT BRAIN  1. No evidence of acute hemorrhage, territorial infarct or vasogenic   edema.  2. Additional findings described in detail above.    CT PERFUSION  1. No predicted core infarct.  2. No evidence of active ischemia-tissue at risk.    CTA  1. Vertebral arteries patent, with right-sided dominance.  2. No evidence of significant stenosis, occlusion or dissection bilateral   extracranial carotid arteries.  3. No evidence of large vessel occlusion, definitive aneurysm or vascular   malformation intracranial circulation. Additional findings, including   anatomic variants, described above.  4. Additional findings described in detail above. Preliminary note, official note pending attending review/signature.                               [facility] Stroke Team    CC: s/p fall, garbled speech. altered mental status  HPI:  The patient is a 73y Female, hx diabetes , HTN, ? behavioral health conditions (unclear exactly at this time), who presented s/p witnessed fall with garbled speech with altered mental status. Per EMS, the pt was making breakfast at 0850 1/11/24   when she suddenly became unable to speak, fell down,, and the pt's home health aide called 911. Pt initially presented to the ED completely aphasic w/ only moaning and no intelligible speech, and R facial droop, but moving all extremities. At the time of this consult, the pt had been given Versed IVP for sedation due to agitation. After ED team spoke with the pt's daughter via phone, it was established that the patient had "difficulty speaking" and was confused for ~1 week and LKW was no longer clear.     PAST MEDICAL & SURGICAL HISTORY:  DM  HTN    Medications: pending     Allergies  Allergy Status Unknown    Intolerances  Unknown    SOCIAL HISTORY:  no tobacco   ? ETOH abuse, pt denies  no drugs    FAMILY HISTORY:  pending, unable to obtain       ROS: 14 point ROS negative other than what is present in HPI or below    Vital Signs Last 24 Hrs  T(C): 35.4 (11 Jan 2024 10:28), Max: 35.4 (11 Jan 2024 10:28)  T(F): 95.8 (11 Jan 2024 10:28), Max: 95.8 (11 Jan 2024 10:28)  HR: 87 (11 Jan 2024 10:28) (87 - 98)  BP: 139/64 (11 Jan 2024 10:28) (135/65 - 140/100)  BP(mean): --  RR: 24 (11 Jan 2024 10:28) (20 - 24)  SpO2: 97% (11 Jan 2024 10:28) (97% - 100%)    Parameters below as of 11 Jan 2024 10:28  Patient On (Oxygen Delivery Method): room air      General: NAD , left nasal ecchymosis     Detailed Neurologic Exam:    Mental status: The patient upon arrival was underneath the covers, arouses briskly to voice and removes and becomes somewhat attentive but at times restless, oriented to self and place,   disoriented to time and states the month is April or May. The patient is able to name objects, follow commands, repeat sentences.    Cranial nerves: Dysarthria, Pupils equal and react symmetrically to light. Overall attends to movement all visual fields but breaks fixation frequently,  Extraocular motion is full with no nystagmus. There is no ptosis. Facial sensation is intact. R facial palsy present.      Motor: There is normal bulk and tone.  There is no tremor.  Strength is 4/5 in the right arm. Slight Drift   Strength is 4/5 in the right leg. Slight Drift   Strength is 5/5 in the left arm.  Strength is 4/5 in the left leg. Slight Drift     Sensation: Intact to light touch and pin in 4 extremities    Cerebellar: There is no gross dysmetria appreciated     Gait : deferred    [facility] SS:  DATE: 1/11/24  TIME: 10:30  1A: Level of consciousness (0-3): 1  1B: Questions (0-2): 1  1C: Commands (0-2): 0  2: Gaze (0-2): 0  3: Visual fields (0-3): 0  4: Facial palsy (0-3): 1  MOTOR:  5A: Left arm motor drift (0-4): 0  5B: Right arm motor drift (0-4): 1  6A: Left leg motor drift (0-4): 1  6B: Right leg motor drift (0-4): 1  7: Limb ataxia (0-2): 0  SENSORY:  8: Sensation (0-2): 0  SPEECH:  9: Language (0-3): 0  10: Dysarthria (0-2): 1  EXTINCTION:  11: Extinction/inattention (0-2): 0    TOTAL SCORE: 7    prehospital mRS= 6 (UTD)    LABS:                         9.7    10.08 )-----------( 299      ( 11 Jan 2024 09:43 )             33.5       01-11    135  |  98  |  11.3  ----------------------------<  203<H>  3.6   |  21.0<L>  |  0.65    Ca    9.7      11 Jan 2024 09:43    TPro  6.7  /  Alb  4.4  /  TBili  0.3<L>  /  DBili  x   /  AST  19  /  ALT  16  /  AlkPhos  93  01-11      PT/INR - ( 11 Jan 2024 09:43 )   PT: 10.3 sec;   INR: 0.93 ratio         PTT - ( 11 Jan 2024 09:43 )  PTT:22.5 sec    Lipid panel: pending  HgbA1c: pending      RADIOLOGY & ADDITIONAL STUDIES (independently reviewed unless otherwise noted):  CT Brain, CTA Brain, CTA Neck, CT Brain Perfusion Stroke Protocol w/ IV Cont (01.11.24 @ 10:03)  IMPRESSION:  CT BRAIN  1. No evidence of acute hemorrhage, territorial infarct or vasogenic   edema.  2. Additional findings described in detail above.    CT PERFUSION  1. No predicted core infarct.  2. No evidence of active ischemia-tissue at risk.    CTA  1. Vertebral arteries patent, with right-sided dominance.  2. No evidence of significant stenosis, occlusion or dissection bilateral   extracranial carotid arteries.  3. No evidence of large vessel occlusion, definitive aneurysm or vascular   malformation intracranial circulation. Additional findings, including   anatomic variants, described above.  4. Additional findings described in detail above. Preliminary note, official note pending attending review/signature.                               [facility] Stroke Team    CC: s/p fall, garbled speech. altered mental status  HPI:  The patient is a 73y Female, hx diabetes , HTN, ? behavioral health conditions (unclear exactly at this time), who presented s/p witnessed fall with garbled speech and altered mental status. Per EMS, the pt was making breakfast at 0850 1/11/24   when she suddenly became unable to speak, fell down,, and the pt's home health aide called 911. Pt initially presented to the ED completely aphasic w/ only moaning and no intelligible speech, and R facial droop, but moving all extremities. At the time of this consult, the pt had been given Versed IVP for sedation due to agitation. After ED team spoke with the pt's daughter via phone, it was established that the patient had "difficulty speaking" and was confused for ~1 week and LKW was no longer clear.     PAST MEDICAL & SURGICAL HISTORY:  DM  HTN    Medications: pending     Allergies  Allergy Status Unknown    Intolerances  Unknown    SOCIAL HISTORY:  no tobacco   ? ETOH abuse, pt denies  no drugs    FAMILY HISTORY:  pending, unable to obtain       ROS: 14 point ROS negative other than what is present in HPI or below    Vital Signs Last 24 Hrs  T(C): 35.4 (11 Jan 2024 10:28), Max: 35.4 (11 Jan 2024 10:28)  T(F): 95.8 (11 Jan 2024 10:28), Max: 95.8 (11 Jan 2024 10:28)  HR: 87 (11 Jan 2024 10:28) (87 - 98)  BP: 139/64 (11 Jan 2024 10:28) (135/65 - 140/100)  BP(mean): --  RR: 24 (11 Jan 2024 10:28) (20 - 24)  SpO2: 97% (11 Jan 2024 10:28) (97% - 100%)    Parameters below as of 11 Jan 2024 10:28  Patient On (Oxygen Delivery Method): room air      General: NAD , left nasal ecchymosis     Detailed Neurologic Exam:    Mental status: The patient upon arrival was underneath the covers, arouses briskly to voice and removes and becomes somewhat attentive but at times restless, oriented to self and place,   disoriented to time and states the month is April or May. The patient is able to name objects, follow commands, repeat sentences.    Cranial nerves: Dysarthria, Pupils equal and react symmetrically to light. Overall attends to movement all visual fields but breaks fixation frequently,  Extraocular motion is full with no nystagmus. There is no ptosis. Facial sensation is intact. R facial palsy present.      Motor: There is normal bulk and tone.  There is no tremor.  Strength is 4/5 in the right arm. Slight Drift   Strength is 4/5 in the right leg. Slight Drift   Strength is 5/5 in the left arm.  Strength is 4/5 in the left leg. Slight Drift     Sensation: Intact to light touch and pin in 4 extremities    Cerebellar: There is no gross dysmetria appreciated     Gait : deferred    [facility] SS:  DATE: 1/11/24  TIME: 10:30  1A: Level of consciousness (0-3): 1  1B: Questions (0-2): 1  1C: Commands (0-2): 0  2: Gaze (0-2): 0  3: Visual fields (0-3): 0  4: Facial palsy (0-3): 1  MOTOR:  5A: Left arm motor drift (0-4): 0  5B: Right arm motor drift (0-4): 1  6A: Left leg motor drift (0-4): 1  6B: Right leg motor drift (0-4): 1  7: Limb ataxia (0-2): 0  SENSORY:  8: Sensation (0-2): 0  SPEECH:  9: Language (0-3): 0  10: Dysarthria (0-2): 1  EXTINCTION:  11: Extinction/inattention (0-2): 0    TOTAL SCORE: 7    prehospital mRS= 6 (UTD)    LABS:                         9.7    10.08 )-----------( 299      ( 11 Jan 2024 09:43 )             33.5       01-11    135  |  98  |  11.3  ----------------------------<  203<H>  3.6   |  21.0<L>  |  0.65    Ca    9.7      11 Jan 2024 09:43    TPro  6.7  /  Alb  4.4  /  TBili  0.3<L>  /  DBili  x   /  AST  19  /  ALT  16  /  AlkPhos  93  01-11      PT/INR - ( 11 Jan 2024 09:43 )   PT: 10.3 sec;   INR: 0.93 ratio         PTT - ( 11 Jan 2024 09:43 )  PTT:22.5 sec    Lipid panel: pending  HgbA1c: pending      RADIOLOGY & ADDITIONAL STUDIES (independently reviewed unless otherwise noted):  CT Brain, CTA Brain, CTA Neck, CT Brain Perfusion Stroke Protocol w/ IV Cont (01.11.24 @ 10:03)  IMPRESSION:  CT BRAIN  1. No evidence of acute hemorrhage, territorial infarct or vasogenic   edema.  2. Additional findings described in detail above.    CT PERFUSION  1. No predicted core infarct.  2. No evidence of active ischemia-tissue at risk.    CTA  1. Vertebral arteries patent, with right-sided dominance.  2. No evidence of significant stenosis, occlusion or dissection bilateral   extracranial carotid arteries.  3. No evidence of large vessel occlusion, definitive aneurysm or vascular   malformation intracranial circulation. Additional findings, including   anatomic variants, described above.  4. Additional findings described in detail above. Preliminary note, official note pending attending review/signature.                               [facility] Stroke Team    CC: s/p fall, garbled speech. altered mental status  HPI:  The patient is a 73y Female, hx diabetes , HTN, ? behavioral health conditions (unclear exactly at this time), who presented s/p witnessed fall with garbled speech and altered mental status. Per EMS, the pt was making breakfast at 0850 1/11/24   when she suddenly became unable to speak, fell down,, and the pt's home health aide called 911. Pt initially presented to the ED completely aphasic w/ only moaning and no intelligible speech, and R facial droop, but moving all extremities. At the time of this consult, the pt had been given Versed IVP for sedation due to agitation. After ED team spoke with the pt's daughter via phone, it was established that the patient had "difficulty speaking" and was confused for ~1 week and LKW was no longer clear.     PAST MEDICAL & SURGICAL HISTORY:  DM  HTN    Medications: pending     Allergies  Allergy Status Unknown    Intolerances  Unknown    SOCIAL HISTORY:  no tobacco   ? ETOH abuse, pt denies  no drugs    FAMILY HISTORY:  pending, unable to obtain       ROS: 14 point ROS negative other than what is present in HPI or below    Vital Signs Last 24 Hrs  T(C): 35.4 (11 Jan 2024 10:28), Max: 35.4 (11 Jan 2024 10:28)  T(F): 95.8 (11 Jan 2024 10:28), Max: 95.8 (11 Jan 2024 10:28)  HR: 87 (11 Jan 2024 10:28) (87 - 98)  BP: 139/64 (11 Jan 2024 10:28) (135/65 - 140/100)  BP(mean): --  RR: 24 (11 Jan 2024 10:28) (20 - 24)  SpO2: 97% (11 Jan 2024 10:28) (97% - 100%)    Parameters below as of 11 Jan 2024 10:28  Patient On (Oxygen Delivery Method): room air      General: NAD , left nasal ecchymosis     Detailed Neurologic Exam:    Mental status: The patient upon arrival was underneath the covers, arouses briskly to voice and removes and becomes somewhat attentive but at times restless, oriented to self and place,   disoriented to time and states the month is April or May. The patient is able to name objects, follow commands, repeat sentences.    Cranial nerves: Dysarthria, Pupils equal and react symmetrically to light. Overall attends to movement all visual fields but breaks fixation frequently,  Extraocular motion is full with no nystagmus. There is no ptosis. Facial sensation is intact. R facial palsy present.      Motor: There is normal bulk and tone.  There is no tremor.  Strength is 4/5 in the right arm. Slight Drift   Strength is 4/5 in the right leg. Slight Drift   Strength is 5/5 in the left arm.  Strength is 4/5 in the left leg. Slight Drift     Sensation: Intact to light touch and pin in 4 extremities    Cerebellar: There is no gross dysmetria appreciated     Gait : deferred    [facility] SS:  DATE: 1/11/24  TIME: 10:30  1A: Level of consciousness (0-3): 1  1B: Questions (0-2): 1  1C: Commands (0-2): 0  2: Gaze (0-2): 0  3: Visual fields (0-3): 0  4: Facial palsy (0-3): 1  MOTOR:  5A: Left arm motor drift (0-4): 0  5B: Right arm motor drift (0-4): 1  6A: Left leg motor drift (0-4): 1  6B: Right leg motor drift (0-4): 1  7: Limb ataxia (0-2): 0  SENSORY:  8: Sensation (0-2): 0  SPEECH:  9: Language (0-3): 0  10: Dysarthria (0-2): 1  EXTINCTION:  11: Extinction/inattention (0-2): 0    TOTAL SCORE: 7    prehospital mRS= 6 (UTD)    LABS:                         9.7    10.08 )-----------( 299      ( 11 Jan 2024 09:43 )             33.5       01-11    135  |  98  |  11.3  ----------------------------<  203<H>  3.6   |  21.0<L>  |  0.65    Ca    9.7      11 Jan 2024 09:43    TPro  6.7  /  Alb  4.4  /  TBili  0.3<L>  /  DBili  x   /  AST  19  /  ALT  16  /  AlkPhos  93  01-11      PT/INR - ( 11 Jan 2024 09:43 )   PT: 10.3 sec;   INR: 0.93 ratio         PTT - ( 11 Jan 2024 09:43 )  PTT:22.5 sec    Lipid panel: pending  HgbA1c: pending      RADIOLOGY & ADDITIONAL STUDIES (independently reviewed unless otherwise noted):  CT Brain, CTA Brain, CTA Neck, CT Brain Perfusion Stroke Protocol w/ IV Cont (01.11.24 @ 10:03)  IMPRESSION:  CT BRAIN  1. No evidence of acute hemorrhage, territorial infarct or vasogenic   edema.  2. Additional findings described in detail above.    CT PERFUSION  1. No predicted core infarct.  2. No evidence of active ischemia-tissue at risk.    CTA  1. Vertebral arteries patent, with right-sided dominance.  2. No evidence of significant stenosis, occlusion or dissection bilateral   extracranial carotid arteries.  3. No evidence of large vessel occlusion, definitive aneurysm or vascular   malformation intracranial circulation. Additional findings, including   anatomic variants, described above.  4. Additional findings described in detail above.                              [facility] Stroke Team    CC: s/p fall, garbled speech. altered mental status  HPI:  The patient is a 73y Female, hx diabetes , HTN, ? behavioral health conditions (unclear exactly at this time), who presented s/p witnessed fall with garbled speech and altered mental status. Per EMS, the pt was making breakfast at 0850 1/11/24   when she suddenly became unable to speak, fell down,, and the pt's home health aide called 911. Pt initially presented to the ED completely aphasic w/ only moaning and no intelligible speech, and R facial droop, but moving all extremities. At the time of this consult, the pt had been given Versed IVP for sedation due to agitation. After ED team spoke with the pt's daughter via phone, it was established that the patient had "difficulty speaking" and was confused for ~1 week and LKW was no longer clear.     PAST MEDICAL & SURGICAL HISTORY:  DM  HTN    Medications: pending     Allergies  Allergy Status Unknown    Intolerances  Unknown    SOCIAL HISTORY:  no tobacco   ? ETOH abuse, pt denies  no drugs    FAMILY HISTORY:  pending, unable to obtain       ROS: 14 point ROS negative other than what is present in HPI or below    Vital Signs Last 24 Hrs  T(C): 35.4 (11 Jan 2024 10:28), Max: 35.4 (11 Jan 2024 10:28)  T(F): 95.8 (11 Jan 2024 10:28), Max: 95.8 (11 Jan 2024 10:28)  HR: 87 (11 Jan 2024 10:28) (87 - 98)  BP: 139/64 (11 Jan 2024 10:28) (135/65 - 140/100)  BP(mean): --  RR: 24 (11 Jan 2024 10:28) (20 - 24)  SpO2: 97% (11 Jan 2024 10:28) (97% - 100%)    Parameters below as of 11 Jan 2024 10:28  Patient On (Oxygen Delivery Method): room air      General: NAD , left nasal ecchymosis     Detailed Neurologic Exam:    Mental status: The patient upon arrival was underneath the covers, arouses briskly to voice and removes and becomes somewhat attentive but at times restless, oriented to self and place,   disoriented to time and states the month is April or May. The patient is able to name objects, follow commands, repeat sentences.    Cranial nerves: Dysarthria, Pupils equal and react symmetrically to light. Overall attends to movement all visual fields but breaks fixation frequently,  Extraocular motion is full with no nystagmus. There is no ptosis. Facial sensation is intact. R facial palsy present.      Motor: There is normal bulk and tone.  There is no tremor.  Strength is 4/5 in the right arm. Slight Drift   Strength is 4/5 in the right leg. Slight Drift   Strength is 5/5 in the left arm.  Strength is 4/5 in the left leg. Slight Drift     Sensation: Intact to light touch and pin in 4 extremities    Cerebellar: There is no gross dysmetria appreciated     Gait : deferred    [facility] SS:  DATE: 1/11/24  TIME: 10:30  1A: Level of consciousness (0-3): 1  1B: Questions (0-2): 1  1C: Commands (0-2): 0  2: Gaze (0-2): 0  3: Visual fields (0-3): 0  4: Facial palsy (0-3): 1  MOTOR:  5A: Left arm motor drift (0-4): 0  5B: Right arm motor drift (0-4): 1  6A: Left leg motor drift (0-4): 1  6B: Right leg motor drift (0-4): 1  7: Limb ataxia (0-2): 0  SENSORY:  8: Sensation (0-2): 0  SPEECH:  9: Language (0-3): 0  10: Dysarthria (0-2): 1  EXTINCTION:  11: Extinction/inattention (0-2): 0    TOTAL SCORE: 7    prehospital mRS= 6 (UTD)    LABS:                         9.7    10.08 )-----------( 299      ( 11 Jan 2024 09:43 )             33.5       01-11    135  |  98  |  11.3  ----------------------------<  203<H>  3.6   |  21.0<L>  |  0.65    Ca    9.7      11 Jan 2024 09:43    TPro  6.7  /  Alb  4.4  /  TBili  0.3<L>  /  DBili  x   /  AST  19  /  ALT  16  /  AlkPhos  93  01-11      PT/INR - ( 11 Jan 2024 09:43 )   PT: 10.3 sec;   INR: 0.93 ratio         PTT - ( 11 Jan 2024 09:43 )  PTT:22.5 sec    Lipid panel: pending  HgbA1c: pending      RADIOLOGY & ADDITIONAL STUDIES (independently reviewed unless otherwise noted):  CT Brain, CTA Brain, CTA Neck, CT Brain Perfusion Stroke Protocol w/ IV Cont (01.11.24 @ 10:03)  IMPRESSION:  CT BRAIN  1. No evidence of acute hemorrhage, territorial infarct or vasogenic   edema.  2. Additional findings described in detail above.    CT PERFUSION  1. No predicted core infarct.  2. No evidence of active ischemia-tissue at risk.    CTA  1. Vertebral arteries patent, with right-sided dominance.  2. No evidence of significant stenosis, occlusion or dissection bilateral   extracranial carotid arteries.  3. No evidence of large vessel occlusion, definitive aneurysm or vascular   malformation intracranial circulation. Additional findings, including   anatomic variants, described above.  4. Additional findings described in detail above.                              [facility] Stroke Team    CC: s/p fall, garbled speech. altered mental status  HPI:  The patient is a 73y Female, hx diabetes , HTN, ? behavioral health conditions (unclear exactly at this time), who presented s/p witnessed fall with garbled speech and altered mental status. Per EMS, the pt was making breakfast at 0850 1/11/24   when she suddenly became unable to speak, fell down,, and the pt's home health aide called 911. Pt initially presented to the ED completely aphasic w/ only moaning and no intelligible speech, and R facial droop, but moving all extremities. At the time of this consult, the pt had been given Versed IVP for sedation due to agitation. After ED team spoke with the pt's daughter via phone, it was established that the patient had "difficulty speaking" and was confused for ~1 week and LKW was no longer clear.     PAST MEDICAL & SURGICAL HISTORY:  DM  HTN    Medications: pending     Allergies  Allergy Status Unknown    Intolerances  Unknown    SOCIAL HISTORY:  no tobacco   ? ETOH abuse, pt denies  no drugs    FAMILY HISTORY:  pending, unable to obtain       ROS: 14 point ROS negative other than what is present in HPI or below    Vital Signs Last 24 Hrs  T(C): 35.4 (11 Jan 2024 10:28), Max: 35.4 (11 Jan 2024 10:28)  T(F): 95.8 (11 Jan 2024 10:28), Max: 95.8 (11 Jan 2024 10:28)  HR: 87 (11 Jan 2024 10:28) (87 - 98)  BP: 139/64 (11 Jan 2024 10:28) (135/65 - 140/100)  BP(mean): --  RR: 24 (11 Jan 2024 10:28) (20 - 24)  SpO2: 97% (11 Jan 2024 10:28) (97% - 100%)    Parameters below as of 11 Jan 2024 10:28  Patient On (Oxygen Delivery Method): room air      General: NAD , left nasal ecchymosis     Detailed Neurologic Exam:    Mental status: The patient upon arrival was underneath the covers, arouses briskly to voice and removes and becomes somewhat attentive but at times restless, oriented to self and place,   disoriented to time and states the month is April or May. The patient is able to name objects, follow commands, repeat sentences.    Cranial nerves: Dysarthria, Pupils equal and react symmetrically to light. Overall attends to movement all visual fields but breaks fixation frequently,  Extraocular motion is full with no nystagmus. There is no ptosis. Facial sensation is intact. R facial palsy present.      Motor: There is normal bulk and tone.  There is no tremor.  Strength is 4/5 in the right arm. Slight Drift   Strength is 4/5 in the right leg. Slight Drift   Strength is 5/5 in the left arm.  Strength is 4/5 in the left leg. Slight Drift     Sensation: Intact to light touch and pin in 4 extremities    Cerebellar: There is no gross dysmetria appreciated     Gait : deferred    [facility] SS:  DATE: 1/11/24  TIME: 10:30  1A: Level of consciousness (0-3): 1  1B: Questions (0-2): 1  1C: Commands (0-2): 0  2: Gaze (0-2): 0  3: Visual fields (0-3): 0  4: Facial palsy (0-3): 1  MOTOR:  5A: Left arm motor drift (0-4): 0  5B: Right arm motor drift (0-4): 1  6A: Left leg motor drift (0-4): 1  6B: Right leg motor drift (0-4): 1  7: Limb ataxia (0-2): 0  SENSORY:  8: Sensation (0-2): 0  SPEECH:  9: Language (0-3): 0  10: Dysarthria (0-2): 1  EXTINCTION:  11: Extinction/inattention (0-2): 0    TOTAL SCORE: 7    prehospital mRS= 6 (UTD)    LABS:                         9.7    10.08 )-----------( 299      ( 11 Jan 2024 09:43 )             33.5       01-11    135  |  98  |  11.3  ----------------------------<  203<H>  3.6   |  21.0<L>  |  0.65    Ca    9.7      11 Jan 2024 09:43    TPro  6.7  /  Alb  4.4  /  TBili  0.3<L>  /  DBili  x   /  AST  19  /  ALT  16  /  AlkPhos  93  01-11      PT/INR - ( 11 Jan 2024 09:43 )   PT: 10.3 sec;   INR: 0.93 ratio         PTT - ( 11 Jan 2024 09:43 )  PTT:22.5 sec    Lipid panel: pending  HgbA1c: pending      RADIOLOGY & ADDITIONAL STUDIES (independently reviewed unless otherwise noted):  CT Brain, CTA Brain, CTA Neck, CT Brain Perfusion Stroke Protocol w/ IV Cont (01.11.24 @ 10:03)  IMPRESSION:  CT BRAIN  1. No evidence of acute hemorrhage, territorial infarct or vasogenic   edema.  2. Additional findings described in detail above.    CT PERFUSION  1. No predicted core infarct.  2. No evidence of active ischemia-tissue at risk.    CTA  1. Vertebral arteries patent, with right-sided dominance.  2. No evidence of significant stenosis, occlusion or dissection bilateral   extracranial carotid arteries.  3. No evidence of large vessel occlusion, definitive aneurysm or vascular   malformation intracranial circulation. Additional findings, including   anatomic variants, described above.  4. Additional findings described in detail above.

## 2024-01-11 NOTE — ED PROVIDER NOTE - NIH STROKE SCALE: 10. DYSARTHRIA, QM
Statement Selected
(2) Severe dysarthria; patients speech is so slurred as to be unintelligible in the absence of or out of proportion to any dysphasia, or is mute/anarthric

## 2024-01-11 NOTE — ED ADULT NURSE NOTE - NSFALLRISKINTERV_ED_ALL_ED
Assistance OOB with selected safe patient handling equipment if applicable/Assistance with ambulation/Communicate fall risk and risk factors to all staff, patient, and family/Monitor gait and stability/Monitor for mental status changes and reorient to person, place, and time, as needed/Provide visual cue: yellow wristband, yellow gown, etc/Reinforce activity limits and safety measures with patient and family/Toileting schedule using arm’s reach rule for commode and bathroom/Use of alarms - bed, stretcher, chair and/or video monitoring/Call bell, personal items and telephone in reach/Instruct patient to call for assistance before getting out of bed/chair/stretcher/Non-slip footwear applied when patient is off stretcher/[place] to call system/Physically safe environment - no spills, clutter or unnecessary equipment/Purposeful Proactive Rounding/Room/bathroom lighting operational, light cord in reach Assistance OOB with selected safe patient handling equipment if applicable/Assistance with ambulation/Communicate fall risk and risk factors to all staff, patient, and family/Monitor gait and stability/Monitor for mental status changes and reorient to person, place, and time, as needed/Provide visual cue: yellow wristband, yellow gown, etc/Reinforce activity limits and safety measures with patient and family/Toileting schedule using arm’s reach rule for commode and bathroom/Use of alarms - bed, stretcher, chair and/or video monitoring/Call bell, personal items and telephone in reach/Instruct patient to call for assistance before getting out of bed/chair/stretcher/Non-slip footwear applied when patient is off stretcher/[place] to call system/Physically safe environment - no spills, clutter or unnecessary equipment/Purposeful Proactive Rounding/Room/bathroom lighting operational, light cord in reach

## 2024-01-11 NOTE — ED ADULT NURSE REASSESSMENT NOTE - NS ED NURSE REASSESS COMMENT FT1
Assumed care of pt in B13L from critical care. Aox3. Pt resting comfortably in stretcher. Denies any complaints at the moment. Denies chest pain, SOB, abd pain, back pain, headaches, dizziness, lightheadedness, fevers, chills, nausea, vomiting, diarrhea, constipation and dysuria. Pt placed on cardiac monitor in NSR 94 HR and  96% RA.  Plan, abnormal labs, history of present illness, pending labs/tests reviewed.
Pt. oriented to person but not time or place. Pt calm in bed showing no signs of distress. Even and unlabored breathing. VS stable on cardiac monitor.

## 2024-01-11 NOTE — ED PROVIDER NOTE - OBJECTIVE STATEMENT
72yo F with PMHx of DM who presents to the ED complaining of aphasia and agitation. Patient was apparently cooking breakfast, completely at baseline, then suddenly collapsed at 8:50am. Patient is now completely aphasic, only moaning with no intelligible speech, not following commands, but moving all extremities. 72yo F with PMHx of DM who presents to the ED complaining of aphasia and agitation. Patient was apparently cooking breakfast, completely at baseline, then suddenly collapsed at 8:50am. Patient is now completely aphasic, only moaning with no intelligible speech, not following commands, but moving all extremities.    Further history obtained from daughter, Timothy: Patient with history of manic depression, HTN, HLD, DMII. Has been having episodes of garbled speech and confusion for a week. She has often been observed talking to herself/someone who's not there. Not on blood thinners, no history of seizures, stroke, or ICH. No recent falls. No new recent meds. Patient has been eating a lot of sugar recently. No recent illnesses. Also had a recent colonoscopy and had a similar reaction when waking up from the anesthesia. No alcohol use since 2008. Does vape nicotine. No other drug use. Reports compliance with meds. 74yo F with PMHx of DM who presents to the ED complaining of aphasia and agitation. Patient was apparently cooking breakfast, completely at baseline, then suddenly collapsed at 8:50am. Patient is now completely aphasic, only moaning with no intelligible speech, not following commands, but moving all extremities.    Further history obtained from daughter, Timothy: Patient with history of manic depression, HTN, HLD, DMII. Has been having episodes of garbled speech and confusion for a week. She has often been observed talking to herself/someone who's not there. Not on blood thinners, no history of seizures, stroke, or ICH. No recent falls. No new recent meds. Patient has been eating a lot of sugar recently. No recent illnesses. Also had a recent colonoscopy and had a similar reaction when waking up from the anesthesia. No alcohol use since 2008. Does vape nicotine. No other drug use. Reports compliance with meds.

## 2024-01-11 NOTE — H&P ADULT - NSHPLABSRESULTS_GEN_ALL_CORE
9.7    10.08 )-----------( 299      ( 11 Jan 2024 09:43 )             33.5         01-11    135  |  98  |  11.3  ----------------------------<  203<H>  3.6   |  21.0<L>  |  0.65    Ca    9.7      11 Jan 2024 09:43    TPro  6.7  /  Alb  4.4  /  TBili  0.3<L>  /  DBili  x   /  AST  19  /  ALT  16  /  AlkPhos  93  01-11            < from: CT Angio Neck Stroke Protocol w/ IV Cont (01.11.24 @ 10:03) >    IMPRESSION:    CT BRAIN  1. No evidence of acute hemorrhage, territorial infarct or vasogenic   edema.  2. Additional findings described in detail above.    CT PERFUSION  1. No predicted core infarct.  2. No evidence of active ischemia-tissue at risk.    CTA  1. Vertebral arteries patent, with right-sided dominance.  2. No evidence of significant stenosis, occlusion or dissection bilateral   extracranial carotid arteries.  3. No evidence of large vessel occlusion, definitive aneurysm or vascular   malformation intracranial circulation. Additional findings, including   anatomic variants, described above.  4. Additional findings described in detail above.      < end of copied text >

## 2024-01-11 NOTE — ED ADULT NURSE NOTE - ED STAT RN HANDOFF DETAILS
Report given to Noelle FORREST in CDU. Receiving nurse made aware pt. is currently at ECHO and will need telemetry monitoring when placed at CDU bed 14L. Patient oriented to self, but not to time or place. Pt on room air. Vital signs stable per flowsheet of last set.

## 2024-01-11 NOTE — ED PROVIDER NOTE - PHYSICAL EXAMINATION
Gen: AAOx0, agitated, restless  HEENT: Normocephalic atraumatic. EOMI. No scleral icterus. Moist mucus membranes.  CV: RRR. Audible S1 and S2. No murmurs. 2+ radial and PT pulses   Pulm: Clear to auscultation bilaterally. No wheezes, rales, or rhonchi. No accessory muscle use or respiratory distress.  Abdomen: soft, normoactive BS, non distended, nontender, no rebound, no guarding  Musculoskeletal:  Moving all extremities equally. No gross deformity. No tenderness to palpation.  Skin: No rashes or lesions. Warm.  Neurologic: see NIHSS. Aphasic, garbled, dysarthric speech. moving all extremities. +R facial droop.

## 2024-01-11 NOTE — CONSULT NOTE ADULT - ASSESSMENT
ASSESSMENT:   73y y.o woman, PMHx HTN, DM II, on psychotropic medications ? diagnosis, who presented s/p witnessed fall with garbled speech and altered mental status on the morning of 1/11/24. Per EMS, the pt was making breakfast at 0850 that morning when she suddenly fell and possibly was aphasic. At neurological exam patient was post sedation but had what appeared to be a right hemiparesis and confusion. CT head demonstrated no acute infarction or hemorrhage, upon further discussion patient was noted by daughter via telephone call with ER to have had difficulty speaking and confusion over the last week therefor tenecteplase was deferred as outside of window.  CT angiogram head/neck showed upon review intracranial atherosclerotic disease, due to no large vessel occlusion therefor not thrombectomy candidate.     Impression: right hemiparesis and confusion possibly left hemispheric dysfunction, rule out stroke, would consider alternate etiologies of encephalopathy as well including but not limited to seizure and infectious process.     NEURO:   -Neurologically, Continue close monitoring for neurologic deterioration    -Stroke neuro checks q 2h  -Permissive HTN, SBP range ~120-130mmHg, goal 130-160mmhg for now given atherosclerotic disease, avoid rapid fluctuations and hypotension   -Obtain lipid panel and A1C   -Titrate statin to LDL goal less than 70  -Obtain MRI Brain w/o  -12 hr vEEG, maintain if evidence of epileptiform abnormality .   -BAL negative   -*ANTITHROMBOTIC THERAPY: ASA 81mg daily, add Clopidogrel 75mg daily for anticipated 90 day course if not contraindicated.    -Dysphagia screen: FAIL due to lethargy 1/11/24 @ 1028, maintain NPO per protocol  -- speech/swallow  c/s to follow  -Physical therapy/OT/Speech eval/treatment.     CARDIOVASCULAR:   -Obtain ECG if not already performed  -Repeat troponin if indicated to r/o cardiac cause of syncope  -Check TTE   -Cardiac monitoring w/ telemetry for now, further evaluation pending findings of noted workup                        HEMATOLOGY:   -H/H 9.7/33.5, Platelets 299, close monitoring of anemia   -Patient should have all age and risk appropriate malignancy screenings with PCP or sooner if clinically suspected      DVT ppx: no neurological contraindication to pharmacological dvt ppx     PULMONARY:   -Protecting airway, saturating well   -CXR: rule out infectious process , also s/p fall   -Supplemental O2 if indicated to maintain spO2 > 94%    RENAL:   -BUN/Cr 11.3/0.65, monitor urine output, maintain adequate hydration       Na Goal:  135-145     Daly: N/A    ID:   -Lactate 5.6, 2/2 underlying etiology? Continue to trend, UA, Blood cx x 2, CXR, further workup pending findings   -Afebrile, no leukocytosis, monitor for si/sx of infection     DISPOSITION: Rehab or home depending on PT eval once stable and workup is complete    CORE MEASURES:        Admission NIHSS:      Tenecteplase : [] YES [x] NO      LDL/HDL/A1C: PENDING     Depression Screen- if depression hx and/or present      Statin Therapy: as noted      Dysphagia Screen: [] PASS [x] FAIL     Smoking [] YES [] NO [x] SERA      Afib [] YES [x] NO     Stroke Education [x] YES [] NO    Obtain screening lower extremity venous ultrasound in patients who meet 1 or more of the following criteria as patient is high risk for DVT/PE on admission:   [] History of DVT/PE  []Hypercoagulable states (Factor V Leiden, Cancer, OCP, etc. )  []Prolonged immobility (hemiplegia/hemiparesis/post operative or any other extended immobilization)  [] Transferred from outside facility (Rehab or Long term care)  [] Age </= to 50 ASSESSMENT:   73y y.o woman, PMHx HTN, DM II, on psychotropic medications ? diagnosis, who presented s/p witnessed fall with garbled speech and altered mental status on the morning of 1/11/24. Per EMS, the pt was making breakfast at 0850 that morning when she suddenly fell and possibly was aphasic. At neurological exam patient was post sedation but had what appeared to be a right hemiparesis and confusion. CT head demonstrated no acute infarction or hemorrhage, upon further discussion patient was noted by daughter via telephone call with ER to have had difficulty speaking and confusion over the last week therefor tenecteplase was deferred as outside of window.  CT angiogram head/neck showed upon review intracranial atherosclerotic disease, due to no large vessel occlusion therefor not thrombectomy candidate.     Impression: right hemiparesis and confusion possibly left hemispheric dysfunction, rule out stroke, would consider alternate etiologies of encephalopathy as well including but not limited to seizure and infectious process.     NEURO:   -Neurologically, Continue close monitoring for neurologic deterioration    -Stroke neuro checks q 2h  -Permissive HTN, SBP range ~120-130mmHg, goal 130-160mmhg for now given atherosclerotic disease, avoid rapid fluctuations and hypotension   -Obtain lipid panel and A1C   -Titrate statin to LDL goal less than 70  -Obtain MRI Brain w/o  -12 hr vEEG, maintain if evidence of epileptiform abnormality .   -BAL negative   -*ANTITHROMBOTIC THERAPY: ASA 81mg daily, add Clopidogrel 75mg daily for anticipated 90 day course if not contraindicated.    -Dysphagia screen: FAIL due to lethargy 1/11/24 @ 1028, maintain NPO per protocol  -- speech/swallow  c/s to follow  -Physical therapy/OT/Speech eval/treatment.     CARDIOVASCULAR:   -Obtain ECG if not already performed  -Repeat troponin if indicated to r/o cardiac cause of syncope  -Check TTE   -Cardiac monitoring w/ telemetry for now, further evaluation pending findings of noted workup                        HEMATOLOGY:   -H/H 9.7/33.5, Platelets 299, close monitoring of anemia   -Patient should have all age and risk appropriate malignancy screenings with PCP or sooner if clinically suspected      DVT ppx: no neurological contraindication to pharmacological dvt ppx     PULMONARY:   -Protecting airway, saturating well   -CXR: rule out infectious process , also s/p fall   -Supplemental O2 if indicated to maintain spO2 > 94%    RENAL:   -BUN/Cr 11.3/0.65, monitor urine output, maintain adequate hydration       Na Goal:  135-145     Daly: N/A    ID:   -Lactate 5.6, 2/2 underlying etiology? Continue to trend, UA, Blood cx x 2, CXR, further workup pending findings   -Afebrile, no leukocytosis, monitor for si/sx of infection     DISPOSITION: Rehab or home depending on PT eval once stable and workup is complete    CORE MEASURES:        Admission NIHSS: 7     Tenecteplase : [] YES [x] NO      LDL/HDL/A1C: PENDING     Depression Screen- if depression hx and/or present      Statin Therapy: as noted      Dysphagia Screen: [] PASS [x] FAIL     Smoking [] YES [] NO [x] SERA      Afib [] YES [x] NO     Stroke Education [x] YES [] NO    Obtain screening lower extremity venous ultrasound in patients who meet 1 or more of the following criteria as patient is high risk for DVT/PE on admission:   [] History of DVT/PE  []Hypercoagulable states (Factor V Leiden, Cancer, OCP, etc. )  []Prolonged immobility (hemiplegia/hemiparesis/post operative or any other extended immobilization)  [] Transferred from outside facility (Rehab or Long term care)  [] Age </= to 50

## 2024-01-11 NOTE — H&P ADULT - NSHPPHYSICALEXAM_GEN_ALL_CORE
GENERAL: pt examined bedside, laying comfortably in bed in NAD  HEENT: NC/AT, moist oral mucosa, clear conjunctiva, sclera nonicteric  RESPIRATORY: Normal respiratory effort, no wheezing, rhonchi, rales  CARDIOVASCULAR: RRR, normal S1 and S2  ABDOMEN: soft, NT/ND, +bowel sounds, no rebound/guarding  MSK: No joint deformities, edema, erythema  EXTREMITIES: No cynaosis, no clubbing, no lower extremity edema  PSYCH: affect bizzarre but cooperative  NEUROLOGY: A+O to self and place, no focal neurologic deficits appreciated  SKIN: No rashes or no palpable lesions

## 2024-01-12 LAB
A1C WITH ESTIMATED AVERAGE GLUCOSE RESULT: 6.2 % — HIGH (ref 4–5.6)
A1C WITH ESTIMATED AVERAGE GLUCOSE RESULT: 6.2 % — HIGH (ref 4–5.6)
ALBUMIN SERPL ELPH-MCNC: 4.2 G/DL — SIGNIFICANT CHANGE UP (ref 3.3–5.2)
ALBUMIN SERPL ELPH-MCNC: 4.2 G/DL — SIGNIFICANT CHANGE UP (ref 3.3–5.2)
ALP SERPL-CCNC: 82 U/L — SIGNIFICANT CHANGE UP (ref 40–120)
ALP SERPL-CCNC: 82 U/L — SIGNIFICANT CHANGE UP (ref 40–120)
ALT FLD-CCNC: 19 U/L — SIGNIFICANT CHANGE UP
ALT FLD-CCNC: 19 U/L — SIGNIFICANT CHANGE UP
ANION GAP SERPL CALC-SCNC: 18 MMOL/L — HIGH (ref 5–17)
ANION GAP SERPL CALC-SCNC: 18 MMOL/L — HIGH (ref 5–17)
AST SERPL-CCNC: 29 U/L — SIGNIFICANT CHANGE UP
AST SERPL-CCNC: 29 U/L — SIGNIFICANT CHANGE UP
BILIRUB SERPL-MCNC: 0.4 MG/DL — SIGNIFICANT CHANGE UP (ref 0.4–2)
BILIRUB SERPL-MCNC: 0.4 MG/DL — SIGNIFICANT CHANGE UP (ref 0.4–2)
BUN SERPL-MCNC: 10.4 MG/DL — SIGNIFICANT CHANGE UP (ref 8–20)
BUN SERPL-MCNC: 10.4 MG/DL — SIGNIFICANT CHANGE UP (ref 8–20)
CALCIUM SERPL-MCNC: 9 MG/DL — SIGNIFICANT CHANGE UP (ref 8.4–10.5)
CALCIUM SERPL-MCNC: 9 MG/DL — SIGNIFICANT CHANGE UP (ref 8.4–10.5)
CHLORIDE SERPL-SCNC: 101 MMOL/L — SIGNIFICANT CHANGE UP (ref 96–108)
CHLORIDE SERPL-SCNC: 101 MMOL/L — SIGNIFICANT CHANGE UP (ref 96–108)
CHOLEST SERPL-MCNC: 145 MG/DL — SIGNIFICANT CHANGE UP
CHOLEST SERPL-MCNC: 145 MG/DL — SIGNIFICANT CHANGE UP
CO2 SERPL-SCNC: 22 MMOL/L — SIGNIFICANT CHANGE UP (ref 22–29)
CO2 SERPL-SCNC: 22 MMOL/L — SIGNIFICANT CHANGE UP (ref 22–29)
CREAT SERPL-MCNC: 0.46 MG/DL — LOW (ref 0.5–1.3)
CREAT SERPL-MCNC: 0.46 MG/DL — LOW (ref 0.5–1.3)
EGFR: 100 ML/MIN/1.73M2 — SIGNIFICANT CHANGE UP
EGFR: 100 ML/MIN/1.73M2 — SIGNIFICANT CHANGE UP
ESTIMATED AVERAGE GLUCOSE: 131 MG/DL — HIGH (ref 68–114)
ESTIMATED AVERAGE GLUCOSE: 131 MG/DL — HIGH (ref 68–114)
FERRITIN SERPL-MCNC: 9 NG/ML — LOW (ref 13–330)
FERRITIN SERPL-MCNC: 9 NG/ML — LOW (ref 13–330)
FOLATE SERPL-MCNC: 18.8 NG/ML — SIGNIFICANT CHANGE UP
FOLATE SERPL-MCNC: 18.8 NG/ML — SIGNIFICANT CHANGE UP
GLUCOSE BLDC GLUCOMTR-MCNC: 123 MG/DL — HIGH (ref 70–99)
GLUCOSE BLDC GLUCOMTR-MCNC: 123 MG/DL — HIGH (ref 70–99)
GLUCOSE BLDC GLUCOMTR-MCNC: 139 MG/DL — HIGH (ref 70–99)
GLUCOSE BLDC GLUCOMTR-MCNC: 139 MG/DL — HIGH (ref 70–99)
GLUCOSE BLDC GLUCOMTR-MCNC: 157 MG/DL — HIGH (ref 70–99)
GLUCOSE BLDC GLUCOMTR-MCNC: 157 MG/DL — HIGH (ref 70–99)
GLUCOSE BLDC GLUCOMTR-MCNC: 192 MG/DL — HIGH (ref 70–99)
GLUCOSE BLDC GLUCOMTR-MCNC: 192 MG/DL — HIGH (ref 70–99)
GLUCOSE SERPL-MCNC: 100 MG/DL — HIGH (ref 70–99)
GLUCOSE SERPL-MCNC: 100 MG/DL — HIGH (ref 70–99)
HCV AB S/CO SERPL IA: 0.08 S/CO — SIGNIFICANT CHANGE UP (ref 0–0.99)
HCV AB S/CO SERPL IA: 0.08 S/CO — SIGNIFICANT CHANGE UP (ref 0–0.99)
HCV AB SERPL-IMP: SIGNIFICANT CHANGE UP
HCV AB SERPL-IMP: SIGNIFICANT CHANGE UP
HDLC SERPL-MCNC: 91 MG/DL — SIGNIFICANT CHANGE UP
HDLC SERPL-MCNC: 91 MG/DL — SIGNIFICANT CHANGE UP
IRON SATN MFR SERPL: 19 UG/DL — LOW (ref 37–145)
IRON SATN MFR SERPL: 19 UG/DL — LOW (ref 37–145)
IRON SATN MFR SERPL: 5 % — LOW (ref 14–50)
IRON SATN MFR SERPL: 5 % — LOW (ref 14–50)
LIPID PNL WITH DIRECT LDL SERPL: 35 MG/DL — SIGNIFICANT CHANGE UP
LIPID PNL WITH DIRECT LDL SERPL: 35 MG/DL — SIGNIFICANT CHANGE UP
MAGNESIUM SERPL-MCNC: 1.7 MG/DL — SIGNIFICANT CHANGE UP (ref 1.6–2.6)
MAGNESIUM SERPL-MCNC: 1.7 MG/DL — SIGNIFICANT CHANGE UP (ref 1.6–2.6)
NON HDL CHOLESTEROL: 54 MG/DL — SIGNIFICANT CHANGE UP
NON HDL CHOLESTEROL: 54 MG/DL — SIGNIFICANT CHANGE UP
PHOSPHATE SERPL-MCNC: 3.3 MG/DL — SIGNIFICANT CHANGE UP (ref 2.4–4.7)
PHOSPHATE SERPL-MCNC: 3.3 MG/DL — SIGNIFICANT CHANGE UP (ref 2.4–4.7)
POTASSIUM SERPL-MCNC: 3.2 MMOL/L — LOW (ref 3.5–5.3)
POTASSIUM SERPL-MCNC: 3.2 MMOL/L — LOW (ref 3.5–5.3)
POTASSIUM SERPL-SCNC: 3.2 MMOL/L — LOW (ref 3.5–5.3)
POTASSIUM SERPL-SCNC: 3.2 MMOL/L — LOW (ref 3.5–5.3)
PROT SERPL-MCNC: 6.2 G/DL — LOW (ref 6.6–8.7)
PROT SERPL-MCNC: 6.2 G/DL — LOW (ref 6.6–8.7)
SODIUM SERPL-SCNC: 141 MMOL/L — SIGNIFICANT CHANGE UP (ref 135–145)
SODIUM SERPL-SCNC: 141 MMOL/L — SIGNIFICANT CHANGE UP (ref 135–145)
TIBC SERPL-MCNC: 375 UG/DL — SIGNIFICANT CHANGE UP (ref 220–430)
TIBC SERPL-MCNC: 375 UG/DL — SIGNIFICANT CHANGE UP (ref 220–430)
TRANSFERRIN SERPL-MCNC: 262 MG/DL — SIGNIFICANT CHANGE UP (ref 192–382)
TRANSFERRIN SERPL-MCNC: 262 MG/DL — SIGNIFICANT CHANGE UP (ref 192–382)
TRIGL SERPL-MCNC: 94 MG/DL — SIGNIFICANT CHANGE UP
TRIGL SERPL-MCNC: 94 MG/DL — SIGNIFICANT CHANGE UP
VIT B12 SERPL-MCNC: 1655 PG/ML — HIGH (ref 232–1245)
VIT B12 SERPL-MCNC: 1655 PG/ML — HIGH (ref 232–1245)

## 2024-01-12 PROCEDURE — 99232 SBSQ HOSP IP/OBS MODERATE 35: CPT

## 2024-01-12 RX ORDER — ACETAMINOPHEN 500 MG
650 TABLET ORAL EVERY 6 HOURS
Refills: 0 | Status: DISCONTINUED | OUTPATIENT
Start: 2024-01-12 | End: 2024-01-16

## 2024-01-12 RX ORDER — SODIUM CHLORIDE 9 MG/ML
1000 INJECTION INTRAMUSCULAR; INTRAVENOUS; SUBCUTANEOUS
Refills: 0 | Status: DISCONTINUED | OUTPATIENT
Start: 2024-01-12 | End: 2024-01-16

## 2024-01-12 RX ORDER — CLOPIDOGREL BISULFATE 75 MG/1
75 TABLET, FILM COATED ORAL DAILY
Refills: 0 | Status: DISCONTINUED | OUTPATIENT
Start: 2024-01-12 | End: 2024-01-16

## 2024-01-12 RX ORDER — MAGNESIUM SULFATE 500 MG/ML
2 VIAL (ML) INJECTION ONCE
Refills: 0 | Status: DISCONTINUED | OUTPATIENT
Start: 2024-01-12 | End: 2024-01-16

## 2024-01-12 RX ORDER — POTASSIUM CHLORIDE 20 MEQ
40 PACKET (EA) ORAL EVERY 4 HOURS
Refills: 0 | Status: COMPLETED | OUTPATIENT
Start: 2024-01-12 | End: 2024-01-12

## 2024-01-12 RX ORDER — IRON SUCROSE 20 MG/ML
200 INJECTION, SOLUTION INTRAVENOUS EVERY 24 HOURS
Refills: 0 | Status: COMPLETED | OUTPATIENT
Start: 2024-01-12 | End: 2024-01-16

## 2024-01-12 RX ADMIN — AMLODIPINE BESYLATE 5 MILLIGRAM(S): 2.5 TABLET ORAL at 04:57

## 2024-01-12 RX ADMIN — PERPHENAZINE 8 MILLIGRAM(S): 8 TABLET, FILM COATED ORAL at 21:19

## 2024-01-12 RX ADMIN — ENOXAPARIN SODIUM 40 MILLIGRAM(S): 100 INJECTION SUBCUTANEOUS at 21:19

## 2024-01-12 RX ADMIN — Medication 40 MILLIEQUIVALENT(S): at 10:57

## 2024-01-12 RX ADMIN — ATORVASTATIN CALCIUM 80 MILLIGRAM(S): 80 TABLET, FILM COATED ORAL at 21:19

## 2024-01-12 RX ADMIN — ARIPIPRAZOLE 30 MILLIGRAM(S): 15 TABLET ORAL at 12:05

## 2024-01-12 RX ADMIN — SODIUM CHLORIDE 50 MILLILITER(S): 9 INJECTION INTRAMUSCULAR; INTRAVENOUS; SUBCUTANEOUS at 13:33

## 2024-01-12 RX ADMIN — IRON SUCROSE 110 MILLIGRAM(S): 20 INJECTION, SOLUTION INTRAVENOUS at 10:57

## 2024-01-12 RX ADMIN — CLOPIDOGREL BISULFATE 75 MILLIGRAM(S): 75 TABLET, FILM COATED ORAL at 12:05

## 2024-01-12 RX ADMIN — Medication 40 MILLIEQUIVALENT(S): at 13:34

## 2024-01-12 RX ADMIN — Medication 81 MILLIGRAM(S): at 12:05

## 2024-01-12 NOTE — PROGRESS NOTE ADULT - NS ATTEND AMEND GEN_ALL_CORE FT
Seen and examined with the ACP team. Plan discussed with ACPs.      Bren Carlin  Vascular Neurology  Office: [phone] Seen and examined with the ACP team. Plan discussed with ACPs.      Bren Carlin  Vascular Neurology  Office: [phone]

## 2024-01-12 NOTE — PROGRESS NOTE ADULT - ASSESSMENT
74 y/o F w/ PMH of bipolar dx vs schizo affective disorder, HTN, HLD, DMII presented after pt collapsed in the kitchen.  She was unresponsive on the floor for a few seconds and upon awakening was noted to be confused with garbled speech.  Pt is a poor historian.  As per ED records, pts daughter reports that she has been having episodes of garbled speech for the past week.  Code stroke called in ED.  CTH negative, CTA h/n and perfusion negative.  Pt admitted for r/o CVA vs seizure.     Seizure vs syncope vs r/o CVA   - Given reports of possible urinary incontinence, possible post ictal state s/p collapse, lactic acidosis and +CPK will order cvEEG to assess for seizures    - Garbled speech could be due to CVA however CTH, CTA h/n and perfusion study negative   - MRI brain pending   - c/w asa, plavix (DAPT x 60 days for ICAD as per neuro)  - , statin  - Neuro stroke following     Microcytic anemia   - Baseline h/h unknown       DM Ii  - ISS and hypoglycemic protocol in place     HTN / HLD  - c/w home meds     ?Bipolar vs Schizophrenia/affective disorder   - c/w home meds     VTE ppx: lovenox     pt consutl likely dcin 1-2 days  72 y/o F w/ PMH of bipolar dx vs schizo affective disorder, HTN, HLD, DMII presented after pt collapsed in the kitchen.  She was unresponsive on the floor for a few seconds and upon awakening was noted to be confused with garbled speech.  Pt is a poor historian.  As per ED records, pts daughter reports that she has been having episodes of garbled speech for the past week.  Code stroke called in ED.  CTH negative, CTA h/n and perfusion negative.  Pt admitted for r/o CVA vs seizure.     Seizure vs syncope vs r/o CVA   - Given reports of possible urinary incontinence, possible post ictal state s/p collapse, lactic acidosis and +CPK will order cvEEG to assess for seizures    - Garbled speech could be due to CVA however CTH, CTA h/n and perfusion study negative   - MRI brain pending   - c/w asa, plavix (DAPT x 60 days for ICAD as per neuro)  - , statin  - Neuro stroke following     Microcytic anemia   - Baseline h/h unknown       DM Ii  - ISS and hypoglycemic protocol in place     HTN / HLD  - c/w home meds     ?Bipolar vs Schizophrenia/affective disorder   - c/w home meds     VTE ppx: lovenox     pt consutl likely dcin 1-2 days  none

## 2024-01-12 NOTE — PROGRESS NOTE ADULT - SUBJECTIVE AND OBJECTIVE BOX
EGDARDO GRAYSON    [id number]    73y      Female    INTERVAL HPI/OVERNIGHT EVENTS:  patient being seen for seizure vs cva. Patient seen at bedside and is getting an eeg. patient is awake and alert      REVIEW OF SYSTEMS:    CONSTITUTIONAL: No fever, weight loss, or fatigue  RESPIRATORY: No cough, wheezing, hemoptysis; No shortness of breath  CARDIOVASCULAR: No chest pain, palpitations  GASTROINTESTINAL: No abdominal or epigastric pain. No nausea, vomiting  NEUROLOGICAL: No headaches, memory loss, loss of strength.  MISCELLANEOUS:      Vital Signs Last 24 Hrs  T(C): 36.7 (12 Jan 2024 07:34), Max: 36.8 (11 Jan 2024 18:40)  T(F): 98.1 (12 Jan 2024 07:34), Max: 98.2 (11 Jan 2024 18:40)  HR: 93 (12 Jan 2024 07:34) (76 - 100)  BP: 113/56 (12 Jan 2024 07:34) (113/56 - 142/67)  BP(mean): --  RR: 18 (12 Jan 2024 07:34) (18 - 18)  SpO2: 96% (12 Jan 2024 07:34) (93% - 97%)    Parameters below as of 12 Jan 2024 07:34  Patient On (Oxygen Delivery Method): room air        PHYSICAL EXAM:    GENERAL: pt examined bedside, laying comfortably in bed in NAD  HEENT: NC/AT, moist oral mucosa, clear conjunctiva, sclera nonicteric  RESPIRATORY: Normal respiratory effort, no wheezing, rhonchi, rales  CARDIOVASCULAR: RRR, normal S1 and S2  ABDOMEN: soft, NT/ND, +bowel sounds, no rebound/guarding  MSK: No joint deformities, edema, erythema  EXTREMITIES: No cynaosis, no clubbing, no lower extremity edema  PSYCH: affect bizzarre but cooperative  NEUROLOGY: A+O to self and place, no focal neurologic deficits appreciated  SKIN: No rashes or no palpable lesions  LABS:                        10.1   8.32  )-----------( 255      ( 11 Jan 2024 15:51 )             34.2     01-12    141  |  101  |  10.4  ----------------------------<  100<H>  3.2<L>   |  22.0  |  0.46<L>    Ca    9.0      12 Jan 2024 07:06  Phos  3.3     01-12  Mg     1.7     01-12    TPro  6.2<L>  /  Alb  4.2  /  TBili  0.4  /  DBili  x   /  AST  29  /  ALT  19  /  AlkPhos  82  01-12    PT/INR - ( 11 Jan 2024 09:43 )   PT: 10.3 sec;   INR: 0.93 ratio         PTT - ( 11 Jan 2024 09:43 )  PTT:22.5 sec  Urinalysis Basic - ( 12 Jan 2024 07:06 )    Color: x / Appearance: x / SG: x / pH: x  Gluc: 100 mg/dL / Ketone: x  / Bili: x / Urobili: x   Blood: x / Protein: x / Nitrite: x   Leuk Esterase: x / RBC: x / WBC x   Sq Epi: x / Non Sq Epi: x / Bacteria: x          MEDICATIONS  (STANDING):  amLODIPine   Tablet 5 milliGRAM(s) Oral daily  ARIPiprazole 30 milliGRAM(s) Oral daily  aspirin enteric coated 81 milliGRAM(s) Oral daily  atorvastatin 80 milliGRAM(s) Oral at bedtime  clopidogrel Tablet 75 milliGRAM(s) Oral daily  dextrose 5%. 1000 milliLiter(s) (50 mL/Hr) IV Continuous <Continuous>  dextrose 5%. 1000 milliLiter(s) (100 mL/Hr) IV Continuous <Continuous>  dextrose 50% Injectable 25 Gram(s) IV Push once  dextrose 50% Injectable 12.5 Gram(s) IV Push once  dextrose 50% Injectable 25 Gram(s) IV Push once  enoxaparin Injectable 40 milliGRAM(s) SubCutaneous every 24 hours  glucagon  Injectable 1 milliGRAM(s) IntraMuscular once  insulin lispro (ADMELOG) corrective regimen sliding scale   SubCutaneous three times a day before meals  iron sucrose IVPB 200 milliGRAM(s) IV Intermittent every 24 hours  magnesium sulfate  IVPB 2 Gram(s) IV Intermittent once  perphenazine 8 milliGRAM(s) Oral at bedtime  potassium chloride    Tablet ER 40 milliEquivalent(s) Oral every 4 hours  sodium chloride 0.9%. 1000 milliLiter(s) (50 mL/Hr) IV Continuous <Continuous>    MEDICATIONS  (PRN):  acetaminophen     Tablet .. 650 milliGRAM(s) Oral every 6 hours PRN Temp greater or equal to 38C (100.4F), Mild Pain (1 - 3)  dextrose Oral Gel 15 Gram(s) Oral once PRN Blood Glucose LESS THAN 70 milliGRAM(s)/deciliter      RADIOLOGY & ADDITIONAL TESTS:   EDGARDO GRAYSON    [id number]    73y      Female    INTERVAL HPI/OVERNIGHT EVENTS:  patient being seen for seizure vs cva. Patient seen at bedside and is getting an eeg. patient is awake and alert      REVIEW OF SYSTEMS:    CONSTITUTIONAL: No fever, weight loss, or fatigue  RESPIRATORY: No cough, wheezing, hemoptysis; No shortness of breath  CARDIOVASCULAR: No chest pain, palpitations  GASTROINTESTINAL: No abdominal or epigastric pain. No nausea, vomiting  NEUROLOGICAL: No headaches, memory loss, loss of strength.  MISCELLANEOUS:      Vital Signs Last 24 Hrs  T(C): 36.7 (12 Jan 2024 07:34), Max: 36.8 (11 Jan 2024 18:40)  T(F): 98.1 (12 Jan 2024 07:34), Max: 98.2 (11 Jan 2024 18:40)  HR: 93 (12 Jan 2024 07:34) (76 - 100)  BP: 113/56 (12 Jan 2024 07:34) (113/56 - 142/67)  BP(mean): --  RR: 18 (12 Jan 2024 07:34) (18 - 18)  SpO2: 96% (12 Jan 2024 07:34) (93% - 97%)    Parameters below as of 12 Jan 2024 07:34  Patient On (Oxygen Delivery Method): room air        PHYSICAL EXAM:    GENERAL: pt examined bedside, laying comfortably in bed in NAD  HEENT: NC/AT, moist oral mucosa, clear conjunctiva, sclera nonicteric  RESPIRATORY: Normal respiratory effort, no wheezing, rhonchi, rales  CARDIOVASCULAR: RRR, normal S1 and S2  ABDOMEN: soft, NT/ND, +bowel sounds, no rebound/guarding  MSK: No joint deformities, edema, erythema  EXTREMITIES: No cynaosis, no clubbing, no lower extremity edema  PSYCH: affect bizzarre but cooperative  NEUROLOGY: A+O to self and place, no focal neurologic deficits appreciated  SKIN: No rashes or no palpable lesions  LABS:                        10.1   8.32  )-----------( 255      ( 11 Jan 2024 15:51 )             34.2     01-12    141  |  101  |  10.4  ----------------------------<  100<H>  3.2<L>   |  22.0  |  0.46<L>    Ca    9.0      12 Jan 2024 07:06  Phos  3.3     01-12  Mg     1.7     01-12    TPro  6.2<L>  /  Alb  4.2  /  TBili  0.4  /  DBili  x   /  AST  29  /  ALT  19  /  AlkPhos  82  01-12    PT/INR - ( 11 Jan 2024 09:43 )   PT: 10.3 sec;   INR: 0.93 ratio         PTT - ( 11 Jan 2024 09:43 )  PTT:22.5 sec  Urinalysis Basic - ( 12 Jan 2024 07:06 )    Color: x / Appearance: x / SG: x / pH: x  Gluc: 100 mg/dL / Ketone: x  / Bili: x / Urobili: x   Blood: x / Protein: x / Nitrite: x   Leuk Esterase: x / RBC: x / WBC x   Sq Epi: x / Non Sq Epi: x / Bacteria: x          MEDICATIONS  (STANDING):  amLODIPine   Tablet 5 milliGRAM(s) Oral daily  ARIPiprazole 30 milliGRAM(s) Oral daily  aspirin enteric coated 81 milliGRAM(s) Oral daily  atorvastatin 80 milliGRAM(s) Oral at bedtime  clopidogrel Tablet 75 milliGRAM(s) Oral daily  dextrose 5%. 1000 milliLiter(s) (50 mL/Hr) IV Continuous <Continuous>  dextrose 5%. 1000 milliLiter(s) (100 mL/Hr) IV Continuous <Continuous>  dextrose 50% Injectable 25 Gram(s) IV Push once  dextrose 50% Injectable 12.5 Gram(s) IV Push once  dextrose 50% Injectable 25 Gram(s) IV Push once  enoxaparin Injectable 40 milliGRAM(s) SubCutaneous every 24 hours  glucagon  Injectable 1 milliGRAM(s) IntraMuscular once  insulin lispro (ADMELOG) corrective regimen sliding scale   SubCutaneous three times a day before meals  iron sucrose IVPB 200 milliGRAM(s) IV Intermittent every 24 hours  magnesium sulfate  IVPB 2 Gram(s) IV Intermittent once  perphenazine 8 milliGRAM(s) Oral at bedtime  potassium chloride    Tablet ER 40 milliEquivalent(s) Oral every 4 hours  sodium chloride 0.9%. 1000 milliLiter(s) (50 mL/Hr) IV Continuous <Continuous>    MEDICATIONS  (PRN):  acetaminophen     Tablet .. 650 milliGRAM(s) Oral every 6 hours PRN Temp greater or equal to 38C (100.4F), Mild Pain (1 - 3)  dextrose Oral Gel 15 Gram(s) Oral once PRN Blood Glucose LESS THAN 70 milliGRAM(s)/deciliter      RADIOLOGY & ADDITIONAL TESTS:

## 2024-01-12 NOTE — EEG REPORT - NS EEG TEXT BOX
EDGARDO GRAYSON [id number]     Study Date: 		01-11-24 1939 01-12-24  0800  Duration x Hours:          11 h   --------------------------------------------------------------------------------------------------  History:  CC/ HPI Patient is a 73y old  Female who presents with a chief complaint of seizure vs cva (11 Jan 2024 14:05)    MEDICATIONS  (STANDING):  amLODIPine   Tablet 5 milliGRAM(s) Oral daily  ARIPiprazole 30 milliGRAM(s) Oral daily  aspirin enteric coated 81 milliGRAM(s) Oral daily  atorvastatin 80 milliGRAM(s) Oral at bedtime  clopidogrel Tablet 75 milliGRAM(s) Oral daily  dextrose 5%. 1000 milliLiter(s) (50 mL/Hr) IV Continuous <Continuous>  dextrose 5%. 1000 milliLiter(s) (100 mL/Hr) IV Continuous <Continuous>  dextrose 50% Injectable 25 Gram(s) IV Push once  dextrose 50% Injectable 25 Gram(s) IV Push once  dextrose 50% Injectable 12.5 Gram(s) IV Push once  enoxaparin Injectable 40 milliGRAM(s) SubCutaneous every 24 hours  glucagon  Injectable 1 milliGRAM(s) IntraMuscular once  insulin lispro (ADMELOG) corrective regimen sliding scale   SubCutaneous three times a day before meals  iron sucrose IVPB 200 milliGRAM(s) IV Intermittent every 24 hours  magnesium sulfate  IVPB 2 Gram(s) IV Intermittent once  perphenazine 8 milliGRAM(s) Oral at bedtime  potassium chloride    Tablet ER 40 milliEquivalent(s) Oral every 4 hours  sodium chloride 0.9%. 1000 milliLiter(s) (50 mL/Hr) IV Continuous <Continuous>    --------------------------------------------------------------------------------------------------  Study Interpretation:    [[[Abbreviation Key:  PDR=alpha rhythm/posterior dominant rhythm. A-P=anterior posterior.  Amplitude: ‘very low’:<20; ‘low’:20-49; ‘medium’:; ‘high’:>150uV.  Persistence for periodic/rhythmic patterns (% of epoch) ‘rare’:<1%; ‘occasional’:1-10%; ‘frequent’:10-50%; ‘abundant’:50-90%; ‘continuous’:>90%.  Persistence for sporadic discharges: ‘rare’:<1/hr; ‘occasional’:1/min-1/hr; ‘frequent’:>1/min; ‘abundant’:>1/10 sec.  RPP=rhythmic and periodic patterns; GRDA=generalized rhythmic delta activity; FIRDA=frontal intermittent GRDA; LRDA=lateralized rhythmic delta activity; TIRDA=temporal intermittent rhythmic delta activity;  LPD=PLED=lateralized periodic discharges; GPD=generalized periodic discharges; BIPDs =bilateral independent periodic discharges; Mf=multifocal; SIRPDs=stimulus induced rhythmic, periodic, or ictal appearing discharges; BIRDs=brief potentially ictal rhythmic discharges >4 Hz, lasting .5-10s; PFA (paroxysmal bursts >13 Hz or =8 Hz <10s).  Modifiers: +F=with fast component; +S=with spike component; +R=with rhythmic component.  S-B=burst suppression pattern.  Max=maximal. N1-drowsy; N2-stage II sleep; N3-slow wave sleep. SSS/BETS=small sharp spikes/benign epileptiform transients of sleep. HV=hyperventilation; PS=photic stimulation]]]    Daily EEG Visual Analysis    FINDINGS:      Background:  Continuity: continuous  Symmetry: symmetric  PDR: 9 Hz activity, with amplitude to 40 uV, that attenuated to eye opening. Low amplitude frontal beta noted in wakefulness.  Reactivity: present  Voltage: normal, mostly 20-150uV  Anterior Posterior Gradient: present  Other background findings: none  Breach: absent    Background Slowing:  Generalized slowing: none was present.  Focal slowing: none was present.    State Changes:   -Drowsiness noted with increased slowing, attenuation of fast activity, vertex transients.  -Present with N2 sleep transients with symmetric spindles and K-complexes.    Sporadic Epileptiform Discharges:    None    Rhythmic and Periodic Patterns (RPPs):  None     Electrographic and Electroclinical seizures:  None    Other Clinical Events:  None    Activation Procedures:   Hyperventilation was not performed.    Photic stimulation was not performed.    Artifacts:  Intermittent myogenic and movement artifacts were noted.    ECG:  The heart rate on single channel ECG was predominantly between  BPM.    EEG Classification / Summary:  Normal  EEG in the awake, drowsy, asleep state(s).    Clinical Impression:  There were no epileptiform abnormalities recorded.      This is a preliminary fellow impression only pending attending review    Brando Guaman MD - Epilepsy Fellow EDGARDO GRAYSON [id number]     Study Date: 		01-11-24 1939 01-12-24  0800  Duration x Hours:          11 h   --------------------------------------------------------------------------------------------------  History:  CC/ HPI Patient is a 73y old  Female who presents with a chief complaint of seizure vs cva (11 Jan 2024 14:05)    MEDICATIONS  (STANDING):  amLODIPine   Tablet 5 milliGRAM(s) Oral daily  ARIPiprazole 30 milliGRAM(s) Oral daily  aspirin enteric coated 81 milliGRAM(s) Oral daily  atorvastatin 80 milliGRAM(s) Oral at bedtime  clopidogrel Tablet 75 milliGRAM(s) Oral daily  dextrose 5%. 1000 milliLiter(s) (50 mL/Hr) IV Continuous <Continuous>  dextrose 5%. 1000 milliLiter(s) (100 mL/Hr) IV Continuous <Continuous>  dextrose 50% Injectable 25 Gram(s) IV Push once  dextrose 50% Injectable 25 Gram(s) IV Push once  dextrose 50% Injectable 12.5 Gram(s) IV Push once  enoxaparin Injectable 40 milliGRAM(s) SubCutaneous every 24 hours  glucagon  Injectable 1 milliGRAM(s) IntraMuscular once  insulin lispro (ADMELOG) corrective regimen sliding scale   SubCutaneous three times a day before meals  iron sucrose IVPB 200 milliGRAM(s) IV Intermittent every 24 hours  magnesium sulfate  IVPB 2 Gram(s) IV Intermittent once  perphenazine 8 milliGRAM(s) Oral at bedtime  potassium chloride    Tablet ER 40 milliEquivalent(s) Oral every 4 hours  sodium chloride 0.9%. 1000 milliLiter(s) (50 mL/Hr) IV Continuous <Continuous>    --------------------------------------------------------------------------------------------------  Study Interpretation:    [[[Abbreviation Key:  PDR=alpha rhythm/posterior dominant rhythm. A-P=anterior posterior.  Amplitude: ‘very low’:<20; ‘low’:20-49; ‘medium’:; ‘high’:>150uV.  Persistence for periodic/rhythmic patterns (% of epoch) ‘rare’:<1%; ‘occasional’:1-10%; ‘frequent’:10-50%; ‘abundant’:50-90%; ‘continuous’:>90%.  Persistence for sporadic discharges: ‘rare’:<1/hr; ‘occasional’:1/min-1/hr; ‘frequent’:>1/min; ‘abundant’:>1/10 sec.  RPP=rhythmic and periodic patterns; GRDA=generalized rhythmic delta activity; FIRDA=frontal intermittent GRDA; LRDA=lateralized rhythmic delta activity; TIRDA=temporal intermittent rhythmic delta activity;  LPD=PLED=lateralized periodic discharges; GPD=generalized periodic discharges; BIPDs =bilateral independent periodic discharges; Mf=multifocal; SIRPDs=stimulus induced rhythmic, periodic, or ictal appearing discharges; BIRDs=brief potentially ictal rhythmic discharges >4 Hz, lasting .5-10s; PFA (paroxysmal bursts >13 Hz or =8 Hz <10s).  Modifiers: +F=with fast component; +S=with spike component; +R=with rhythmic component.  S-B=burst suppression pattern.  Max=maximal. N1-drowsy; N2-stage II sleep; N3-slow wave sleep. SSS/BETS=small sharp spikes/benign epileptiform transients of sleep. HV=hyperventilation; PS=photic stimulation]]]    Daily EEG Visual Analysis    FINDINGS:      Background:  Continuity: continuous  Symmetry: symmetric  PDR: 9 Hz activity, with amplitude to 40 uV, that attenuated to eye opening. Low amplitude frontal beta noted in wakefulness.  Reactivity: present  Voltage: normal, mostly 20-150uV  Anterior Posterior Gradient: present  Other background findings: none  Breach: absent    Background Slowing:  Generalized slowing: none was present.  Focal slowing: none was present.    State Changes:   -Drowsiness noted with increased slowing, attenuation of fast activity, vertex transients.  -Present with N2 sleep transients with symmetric spindles and K-complexes.    Sporadic Epileptiform Discharges:    None    Rhythmic and Periodic Patterns (RPPs):  None     Electrographic and Electroclinical seizures:  None    Other Clinical Events:  None    Activation Procedures:   Hyperventilation was not performed.    Photic stimulation was not performed.    Artifacts:  Intermittent myogenic and movement artifacts were noted.    ECG:  The heart rate on single channel ECG was predominantly between  BPM.    EEG Classification / Summary:  Normal  EEG in the awake, drowsy, asleep state(s).    Clinical Impression:  There were no epileptiform abnormalities recorded.      This is a preliminary fellow impression only pending attending review    Brando Guaman MD - Epilepsy Fellow EDGARDO GRAYSON [id number]     Study Date: 		01-11-24 1939 01-12-24  0800  Duration x Hours:          12 h   --------------------------------------------------------------------------------------------------  History:  CC/ HPI Patient is a 73y old  Female who presents with a chief complaint of seizure vs cva (11 Jan 2024 14:05)    MEDICATIONS  (STANDING):  amLODIPine   Tablet 5 milliGRAM(s) Oral daily  ARIPiprazole 30 milliGRAM(s) Oral daily  aspirin enteric coated 81 milliGRAM(s) Oral daily  atorvastatin 80 milliGRAM(s) Oral at bedtime  clopidogrel Tablet 75 milliGRAM(s) Oral daily  dextrose 5%. 1000 milliLiter(s) (50 mL/Hr) IV Continuous <Continuous>  dextrose 5%. 1000 milliLiter(s) (100 mL/Hr) IV Continuous <Continuous>  dextrose 50% Injectable 25 Gram(s) IV Push once  dextrose 50% Injectable 25 Gram(s) IV Push once  dextrose 50% Injectable 12.5 Gram(s) IV Push once  enoxaparin Injectable 40 milliGRAM(s) SubCutaneous every 24 hours  glucagon  Injectable 1 milliGRAM(s) IntraMuscular once  insulin lispro (ADMELOG) corrective regimen sliding scale   SubCutaneous three times a day before meals  iron sucrose IVPB 200 milliGRAM(s) IV Intermittent every 24 hours  magnesium sulfate  IVPB 2 Gram(s) IV Intermittent once  perphenazine 8 milliGRAM(s) Oral at bedtime  potassium chloride    Tablet ER 40 milliEquivalent(s) Oral every 4 hours  sodium chloride 0.9%. 1000 milliLiter(s) (50 mL/Hr) IV Continuous <Continuous>    --------------------------------------------------------------------------------------------------  Study Interpretation:    [[[Abbreviation Key:  PDR=alpha rhythm/posterior dominant rhythm. A-P=anterior posterior.  Amplitude: ‘very low’:<20; ‘low’:20-49; ‘medium’:; ‘high’:>150uV.  Persistence for periodic/rhythmic patterns (% of epoch) ‘rare’:<1%; ‘occasional’:1-10%; ‘frequent’:10-50%; ‘abundant’:50-90%; ‘continuous’:>90%.  Persistence for sporadic discharges: ‘rare’:<1/hr; ‘occasional’:1/min-1/hr; ‘frequent’:>1/min; ‘abundant’:>1/10 sec.  RPP=rhythmic and periodic patterns; GRDA=generalized rhythmic delta activity; FIRDA=frontal intermittent GRDA; LRDA=lateralized rhythmic delta activity; TIRDA=temporal intermittent rhythmic delta activity;  LPD=PLED=lateralized periodic discharges; GPD=generalized periodic discharges; BIPDs =bilateral independent periodic discharges; Mf=multifocal; SIRPDs=stimulus induced rhythmic, periodic, or ictal appearing discharges; BIRDs=brief potentially ictal rhythmic discharges >4 Hz, lasting .5-10s; PFA (paroxysmal bursts >13 Hz or =8 Hz <10s).  Modifiers: +F=with fast component; +S=with spike component; +R=with rhythmic component.  S-B=burst suppression pattern.  Max=maximal. N1-drowsy; N2-stage II sleep; N3-slow wave sleep. SSS/BETS=small sharp spikes/benign epileptiform transients of sleep. HV=hyperventilation; PS=photic stimulation]]]    Daily EEG Visual Analysis    FINDINGS:      Background:  Continuity: continuous  Symmetry: symmetric  PDR: 9 Hz activity, with amplitude to 40 uV, that attenuated to eye opening. Low amplitude frontal beta noted in wakefulness.  Reactivity: present  Voltage: normal, mostly 20-150uV  Anterior Posterior Gradient: present  Other background findings: none  Breach: absent    Background Slowing:  Generalized slowing: none was present.  Focal slowing: none was present.    State Changes:   -Drowsiness noted with increased slowing, attenuation of fast activity, vertex transients.  -Present with N2 sleep transients with symmetric spindles and K-complexes.    Sporadic Epileptiform Discharges:    None    Rhythmic and Periodic Patterns (RPPs):  None     Electrographic and Electroclinical seizures:  None    Other Clinical Events:  None    Activation Procedures:   Hyperventilation was not performed.    Photic stimulation was not performed.    Artifacts:  Intermittent myogenic and movement artifacts were noted.    ECG:  The heart rate on single channel ECG was predominantly between  BPM.    EEG Classification / Summary:  Normal  EEG in the awake, drowsy, asleep state(s).    Clinical Impression:  There were no epileptiform abnormalities recorded.      Brando Guaman MD - Epilepsy Fellow    Lewis Rosenbaum MD  EEG/Epilepsy Attending  EDGARDO GRAYSON [id number]     Study Date: 		01-11-24 1939 01-12-24  0800  Duration x Hours:          12 h   --------------------------------------------------------------------------------------------------  History:  CC/ HPI Patient is a 73y old  Female who presents with a chief complaint of seizure vs cva (11 Jan 2024 14:05)    MEDICATIONS  (STANDING):  amLODIPine   Tablet 5 milliGRAM(s) Oral daily  ARIPiprazole 30 milliGRAM(s) Oral daily  aspirin enteric coated 81 milliGRAM(s) Oral daily  atorvastatin 80 milliGRAM(s) Oral at bedtime  clopidogrel Tablet 75 milliGRAM(s) Oral daily  dextrose 5%. 1000 milliLiter(s) (50 mL/Hr) IV Continuous <Continuous>  dextrose 5%. 1000 milliLiter(s) (100 mL/Hr) IV Continuous <Continuous>  dextrose 50% Injectable 25 Gram(s) IV Push once  dextrose 50% Injectable 25 Gram(s) IV Push once  dextrose 50% Injectable 12.5 Gram(s) IV Push once  enoxaparin Injectable 40 milliGRAM(s) SubCutaneous every 24 hours  glucagon  Injectable 1 milliGRAM(s) IntraMuscular once  insulin lispro (ADMELOG) corrective regimen sliding scale   SubCutaneous three times a day before meals  iron sucrose IVPB 200 milliGRAM(s) IV Intermittent every 24 hours  magnesium sulfate  IVPB 2 Gram(s) IV Intermittent once  perphenazine 8 milliGRAM(s) Oral at bedtime  potassium chloride    Tablet ER 40 milliEquivalent(s) Oral every 4 hours  sodium chloride 0.9%. 1000 milliLiter(s) (50 mL/Hr) IV Continuous <Continuous>    --------------------------------------------------------------------------------------------------  Study Interpretation:    [[[Abbreviation Key:  PDR=alpha rhythm/posterior dominant rhythm. A-P=anterior posterior.  Amplitude: ‘very low’:<20; ‘low’:20-49; ‘medium’:; ‘high’:>150uV.  Persistence for periodic/rhythmic patterns (% of epoch) ‘rare’:<1%; ‘occasional’:1-10%; ‘frequent’:10-50%; ‘abundant’:50-90%; ‘continuous’:>90%.  Persistence for sporadic discharges: ‘rare’:<1/hr; ‘occasional’:1/min-1/hr; ‘frequent’:>1/min; ‘abundant’:>1/10 sec.  RPP=rhythmic and periodic patterns; GRDA=generalized rhythmic delta activity; FIRDA=frontal intermittent GRDA; LRDA=lateralized rhythmic delta activity; TIRDA=temporal intermittent rhythmic delta activity;  LPD=PLED=lateralized periodic discharges; GPD=generalized periodic discharges; BIPDs =bilateral independent periodic discharges; Mf=multifocal; SIRPDs=stimulus induced rhythmic, periodic, or ictal appearing discharges; BIRDs=brief potentially ictal rhythmic discharges >4 Hz, lasting .5-10s; PFA (paroxysmal bursts >13 Hz or =8 Hz <10s).  Modifiers: +F=with fast component; +S=with spike component; +R=with rhythmic component.  S-B=burst suppression pattern.  Max=maximal. N1-drowsy; N2-stage II sleep; N3-slow wave sleep. SSS/BETS=small sharp spikes/benign epileptiform transients of sleep. HV=hyperventilation; PS=photic stimulation]]]    Daily EEG Visual Analysis    FINDINGS:      Background:  Continuity: continuous  Symmetry: symmetric  PDR: 9 Hz activity, with amplitude to 40 uV, that attenuated to eye opening. Low amplitude frontal beta noted in wakefulness.  Reactivity: present  Voltage: normal, mostly 20-150uV  Anterior Posterior Gradient: present  Other background findings: none  Breach: absent    Background Slowing:  Generalized slowing: none was present.  Focal slowing: none was present.    State Changes:   -Drowsiness noted with increased slowing, attenuation of fast activity, vertex transients.  -Present with N2 sleep transients with symmetric spindles and K-complexes.    Sporadic Epileptiform Discharges:    None    Rhythmic and Periodic Patterns (RPPs):  None     Electrographic and Electroclinical seizures:  None    Other Clinical Events:  None    Activation Procedures:   Hyperventilation was not performed.    Photic stimulation was not performed.    Artifacts:  Intermittent myogenic and movement artifacts were noted.    ECG:  The heart rate on single channel ECG was predominantly between  BPM.    EEG Classification / Summary:  Normal  EEG in the awake, drowsy, asleep state(s).    Clinical Impression:  There were no epileptiform abnormalities recorded.      Brando Guaman MD - Epilepsy Fellow    Lewis Rosenbaum MD  EEG/Epilepsy Attending  EDGARDO GRAYSON [id number]     Study Date: 		01-11-24 1939 01-12-24 1200  Duration x Hours:          16 h   --------------------------------------------------------------------------------------------------  History:  CC/ HPI Patient is a 73y old  Female who presents with a chief complaint of seizure vs cva (11 Jan 2024 14:05)    MEDICATIONS  (STANDING):  amLODIPine   Tablet 5 milliGRAM(s) Oral daily  ARIPiprazole 30 milliGRAM(s) Oral daily  aspirin enteric coated 81 milliGRAM(s) Oral daily  atorvastatin 80 milliGRAM(s) Oral at bedtime  clopidogrel Tablet 75 milliGRAM(s) Oral daily  dextrose 5%. 1000 milliLiter(s) (50 mL/Hr) IV Continuous <Continuous>  dextrose 5%. 1000 milliLiter(s) (100 mL/Hr) IV Continuous <Continuous>  dextrose 50% Injectable 25 Gram(s) IV Push once  dextrose 50% Injectable 25 Gram(s) IV Push once  dextrose 50% Injectable 12.5 Gram(s) IV Push once  enoxaparin Injectable 40 milliGRAM(s) SubCutaneous every 24 hours  glucagon  Injectable 1 milliGRAM(s) IntraMuscular once  insulin lispro (ADMELOG) corrective regimen sliding scale   SubCutaneous three times a day before meals  iron sucrose IVPB 200 milliGRAM(s) IV Intermittent every 24 hours  magnesium sulfate  IVPB 2 Gram(s) IV Intermittent once  perphenazine 8 milliGRAM(s) Oral at bedtime  potassium chloride    Tablet ER 40 milliEquivalent(s) Oral every 4 hours  sodium chloride 0.9%. 1000 milliLiter(s) (50 mL/Hr) IV Continuous <Continuous>    --------------------------------------------------------------------------------------------------  Study Interpretation:    [[[Abbreviation Key:  PDR=alpha rhythm/posterior dominant rhythm. A-P=anterior posterior.  Amplitude: ‘very low’:<20; ‘low’:20-49; ‘medium’:; ‘high’:>150uV.  Persistence for periodic/rhythmic patterns (% of epoch) ‘rare’:<1%; ‘occasional’:1-10%; ‘frequent’:10-50%; ‘abundant’:50-90%; ‘continuous’:>90%.  Persistence for sporadic discharges: ‘rare’:<1/hr; ‘occasional’:1/min-1/hr; ‘frequent’:>1/min; ‘abundant’:>1/10 sec.  RPP=rhythmic and periodic patterns; GRDA=generalized rhythmic delta activity; FIRDA=frontal intermittent GRDA; LRDA=lateralized rhythmic delta activity; TIRDA=temporal intermittent rhythmic delta activity;  LPD=PLED=lateralized periodic discharges; GPD=generalized periodic discharges; BIPDs =bilateral independent periodic discharges; Mf=multifocal; SIRPDs=stimulus induced rhythmic, periodic, or ictal appearing discharges; BIRDs=brief potentially ictal rhythmic discharges >4 Hz, lasting .5-10s; PFA (paroxysmal bursts >13 Hz or =8 Hz <10s).  Modifiers: +F=with fast component; +S=with spike component; +R=with rhythmic component.  S-B=burst suppression pattern.  Max=maximal. N1-drowsy; N2-stage II sleep; N3-slow wave sleep. SSS/BETS=small sharp spikes/benign epileptiform transients of sleep. HV=hyperventilation; PS=photic stimulation]]]    Daily EEG Visual Analysis    FINDINGS:      Background:  Continuity: continuous  Symmetry: symmetric  PDR: 9 Hz activity, with amplitude to 40 uV, that attenuated to eye opening. Low amplitude frontal beta noted in wakefulness.  Reactivity: present  Voltage: normal, mostly 20-150uV  Anterior Posterior Gradient: present  Other background findings: none  Breach: absent    Background Slowing:  Generalized slowing: none was present.  Focal slowing: none was present.    State Changes:   -Drowsiness noted with increased slowing, attenuation of fast activity, vertex transients.  -Present with N2 sleep transients with symmetric spindles and K-complexes.    Sporadic Epileptiform Discharges:    None    Rhythmic and Periodic Patterns (RPPs):  None     Electrographic and Electroclinical seizures:  None    Other Clinical Events:  None    Activation Procedures:   Hyperventilation was not performed.    Photic stimulation was not performed.    Artifacts:  Intermittent myogenic and movement artifacts were noted.    ECG:  The heart rate on single channel ECG was predominantly between  BPM.    EEG Classification / Summary:  Normal  EEG in the awake, drowsy, asleep state(s).    Clinical Impression:  There were no epileptiform abnormalities recorded.      Brando Guaman MD - Epilepsy Fellow    Lewis Rosenbaum MD  EEG/Epilepsy Attending  EDGARDO GRAYSON [id number]     Study Date: 		01-11-24 1939 01-12-24 1200  Duration x Hours:          16 h   --------------------------------------------------------------------------------------------------  History:  CC/ HPI Patient is a 73y old  Female who presents with a chief complaint of seizure vs cva (11 Jan 2024 14:05)    MEDICATIONS  (STANDING):  amLODIPine   Tablet 5 milliGRAM(s) Oral daily  ARIPiprazole 30 milliGRAM(s) Oral daily  aspirin enteric coated 81 milliGRAM(s) Oral daily  atorvastatin 80 milliGRAM(s) Oral at bedtime  clopidogrel Tablet 75 milliGRAM(s) Oral daily  dextrose 5%. 1000 milliLiter(s) (50 mL/Hr) IV Continuous <Continuous>  dextrose 5%. 1000 milliLiter(s) (100 mL/Hr) IV Continuous <Continuous>  dextrose 50% Injectable 25 Gram(s) IV Push once  dextrose 50% Injectable 25 Gram(s) IV Push once  dextrose 50% Injectable 12.5 Gram(s) IV Push once  enoxaparin Injectable 40 milliGRAM(s) SubCutaneous every 24 hours  glucagon  Injectable 1 milliGRAM(s) IntraMuscular once  insulin lispro (ADMELOG) corrective regimen sliding scale   SubCutaneous three times a day before meals  iron sucrose IVPB 200 milliGRAM(s) IV Intermittent every 24 hours  magnesium sulfate  IVPB 2 Gram(s) IV Intermittent once  perphenazine 8 milliGRAM(s) Oral at bedtime  potassium chloride    Tablet ER 40 milliEquivalent(s) Oral every 4 hours  sodium chloride 0.9%. 1000 milliLiter(s) (50 mL/Hr) IV Continuous <Continuous>    --------------------------------------------------------------------------------------------------  Study Interpretation:    [[[Abbreviation Key:  PDR=alpha rhythm/posterior dominant rhythm. A-P=anterior posterior.  Amplitude: ‘very low’:<20; ‘low’:20-49; ‘medium’:; ‘high’:>150uV.  Persistence for periodic/rhythmic patterns (% of epoch) ‘rare’:<1%; ‘occasional’:1-10%; ‘frequent’:10-50%; ‘abundant’:50-90%; ‘continuous’:>90%.  Persistence for sporadic discharges: ‘rare’:<1/hr; ‘occasional’:1/min-1/hr; ‘frequent’:>1/min; ‘abundant’:>1/10 sec.  RPP=rhythmic and periodic patterns; GRDA=generalized rhythmic delta activity; FIRDA=frontal intermittent GRDA; LRDA=lateralized rhythmic delta activity; TIRDA=temporal intermittent rhythmic delta activity;  LPD=PLED=lateralized periodic discharges; GPD=generalized periodic discharges; BIPDs =bilateral independent periodic discharges; Mf=multifocal; SIRPDs=stimulus induced rhythmic, periodic, or ictal appearing discharges; BIRDs=brief potentially ictal rhythmic discharges >4 Hz, lasting .5-10s; PFA (paroxysmal bursts >13 Hz or =8 Hz <10s).  Modifiers: +F=with fast component; +S=with spike component; +R=with rhythmic component.  S-B=burst suppression pattern.  Max=maximal. N1-drowsy; N2-stage II sleep; N3-slow wave sleep. SSS/BETS=small sharp spikes/benign epileptiform transients of sleep. HV=hyperventilation; PS=photic stimulation]]]    Daily EEG Visual Analysis    FINDINGS:      Background:  Continuity: continuous  Symmetry: symmetric  PDR: 9 Hz activity, with amplitude to 40 uV, that attenuated to eye opening. Low amplitude frontal beta noted in wakefulness.  Reactivity: present  Voltage: normal, mostly 20-150uV  Anterior Posterior Gradient: present  Other background findings: none  Breach: absent    Background Slowing:  Generalized slowing: none was present.  Focal slowing: none was present.    State Changes:   -Drowsiness noted with increased slowing, attenuation of fast activity, vertex transients.  -Present with N2 sleep transients with symmetric spindles and K-complexes.    Sporadic Epileptiform Discharges:    None    Rhythmic and Periodic Patterns (RPPs):  None     Electrographic and Electroclinical seizures:  None    Other Clinical Events:  None    Activation Procedures:   Hyperventilation was not performed.    Photic stimulation was not performed.    Artifacts:  Intermittent myogenic and movement artifacts were noted.    ECG:  The heart rate on single channel ECG was predominantly between  BPM.    EEG Classification / Summary:  Normal  EEG in the awake, drowsy, asleep state(s).    Clinical Impression:  There were no epileptiform abnormalities recorded.      Brando Guaman MD - Epilepsy Fellow    Lewis Rosenbaum MD  EEG/Epilepsy Attending

## 2024-01-12 NOTE — PROGRESS NOTE ADULT - SUBJECTIVE AND OBJECTIVE BOX
Preliminary note, offical recommendations pending attending review/signature     [facility] Stroke Team  Progress Note     HPI:  74 y/o F w/ PMH of bipolar dx vs schizo affective disorder, HTN, HLD, DMII presented after pt collapsed in the kitchen.  She was unresponsive on the floor for a few seconds and upon awakening was noted to be confused with garbled speech.  Pt is a poor historian and does not remember what happened that caused her to come to hospital.  Pt does remember waking up on the floor of the kitchen but nothing further.  She has never had anything like this before.  As per ED records, pts daughter reports that she has been having episodes of garbled speech for the past week.  She has no substance abuse history and has not consumed etoh since 2008.  She denies prodrome of diaphoresis, palpitations, cp, sob.  Pt denies vision or hearing changes. (11 Jan 2024 14:05)    Admission NIHSS  Pre-MRS  ICH Score (if applicable)    SUBJECTIVE: No events overnight.  No new neurologic complaints.  ROS reported negative unless otherwise noted.    acetaminophen     Tablet .. 650 milliGRAM(s) Oral every 6 hours PRN  amLODIPine   Tablet 5 milliGRAM(s) Oral daily  ARIPiprazole 30 milliGRAM(s) Oral daily  aspirin enteric coated 81 milliGRAM(s) Oral daily  atorvastatin 80 milliGRAM(s) Oral at bedtime  clopidogrel Tablet 75 milliGRAM(s) Oral daily  dextrose 5%. 1000 milliLiter(s) IV Continuous <Continuous>  dextrose 5%. 1000 milliLiter(s) IV Continuous <Continuous>  dextrose 50% Injectable 25 Gram(s) IV Push once  dextrose 50% Injectable 12.5 Gram(s) IV Push once  dextrose 50% Injectable 25 Gram(s) IV Push once  dextrose Oral Gel 15 Gram(s) Oral once PRN  enoxaparin Injectable 40 milliGRAM(s) SubCutaneous every 24 hours  glucagon  Injectable 1 milliGRAM(s) IntraMuscular once  insulin lispro (ADMELOG) corrective regimen sliding scale   SubCutaneous three times a day before meals  iron sucrose IVPB 200 milliGRAM(s) IV Intermittent every 24 hours  magnesium sulfate  IVPB 2 Gram(s) IV Intermittent once  perphenazine 8 milliGRAM(s) Oral at bedtime  potassium chloride    Tablet ER 40 milliEquivalent(s) Oral every 4 hours  sodium chloride 0.9%. 1000 milliLiter(s) IV Continuous <Continuous>      PHYSICAL EXAM:   Vital Signs Last 24 Hrs  T(C): 36.7 (12 Jan 2024 07:34), Max: 36.8 (11 Jan 2024 18:40)  T(F): 98.1 (12 Jan 2024 07:34), Max: 98.2 (11 Jan 2024 18:40)  HR: 93 (12 Jan 2024 07:34) (76 - 100)  BP: 113/56 (12 Jan 2024 07:34) (113/56 - 142/67)  BP(mean): 85 (11 Jan 2024 12:56) (85 - 85)  RR: 18 (12 Jan 2024 07:34) (18 - 19)  SpO2: 96% (12 Jan 2024 07:34) (93% - 97%)    Parameters below as of 12 Jan 2024 07:34  Patient On (Oxygen Delivery Method): room air        General: No acute distress    NEUROLOGICAL EXAM:  Mental status: Awake, alert, oriented x3, speech fluent, follows commands, no neglect, normal memory   Cranial Nerves: No facial asymmetry, no nystagmus, no dysarthria,  tongue midline  Motor exam: Normal tone, no drift, 5/5 RUE, 5/5 RLE, 5/5 LUE, 5/5 LLE, normal fine finger movements.  Sensation: Intact to light touch   Coordination/ Gait: No dysmetria, gait not tested    LABS:                        10.1   8.32  )-----------( 255      ( 11 Jan 2024 15:51 )             34.2    01-12    141  |  101  |  10.4  ----------------------------<  100<H>  3.2<L>   |  22.0  |  0.46<L>    Ca    9.0      12 Jan 2024 07:06  Phos  3.3     01-12  Mg     1.7     01-12    TPro  6.2<L>  /  Alb  4.2  /  TBili  0.4  /  DBili  x   /  AST  29  /  ALT  19  /  AlkPhos  82  01-12  PT/INR - ( 11 Jan 2024 09:43 )   PT: 10.3 sec;   INR: 0.93 ratio         PTT - ( 11 Jan 2024 09:43 )  PTT:22.5 sec      IMAGING: Reviewed by me.      Preliminary note, offical recommendations pending attending review/signature     [facility] Stroke Team  Progress Note     HPI:  74 y/o F w/ PMH of bipolar dx vs schizo affective disorder, HTN, HLD, DMII presented after pt collapsed in the kitchen.  She was unresponsive on the floor for a few seconds and upon awakening was noted to be confused with garbled speech.  Pt is a poor historian and does not remember what happened that caused her to come to hospital.  Pt does remember waking up on the floor of the kitchen but nothing further.  She has never had anything like this before.  As per ED records, pts daughter reports that she has been having episodes of garbled speech for the past week.  She has no substance abuse history and has not consumed etoh since 2008.  She denies prodrome of diaphoresis, palpitations, cp, sob.  Pt denies vision or hearing changes. (11 Jan 2024 14:05)    Admission NIHSS  Pre-MRS  ICH Score (if applicable)    SUBJECTIVE: No events overnight.  No new neurologic complaints.  ROS reported negative unless otherwise noted.    acetaminophen     Tablet .. 650 milliGRAM(s) Oral every 6 hours PRN  amLODIPine   Tablet 5 milliGRAM(s) Oral daily  ARIPiprazole 30 milliGRAM(s) Oral daily  aspirin enteric coated 81 milliGRAM(s) Oral daily  atorvastatin 80 milliGRAM(s) Oral at bedtime  clopidogrel Tablet 75 milliGRAM(s) Oral daily  dextrose 5%. 1000 milliLiter(s) IV Continuous <Continuous>  dextrose 5%. 1000 milliLiter(s) IV Continuous <Continuous>  dextrose 50% Injectable 25 Gram(s) IV Push once  dextrose 50% Injectable 12.5 Gram(s) IV Push once  dextrose 50% Injectable 25 Gram(s) IV Push once  dextrose Oral Gel 15 Gram(s) Oral once PRN  enoxaparin Injectable 40 milliGRAM(s) SubCutaneous every 24 hours  glucagon  Injectable 1 milliGRAM(s) IntraMuscular once  insulin lispro (ADMELOG) corrective regimen sliding scale   SubCutaneous three times a day before meals  iron sucrose IVPB 200 milliGRAM(s) IV Intermittent every 24 hours  magnesium sulfate  IVPB 2 Gram(s) IV Intermittent once  perphenazine 8 milliGRAM(s) Oral at bedtime  potassium chloride    Tablet ER 40 milliEquivalent(s) Oral every 4 hours  sodium chloride 0.9%. 1000 milliLiter(s) IV Continuous <Continuous>      PHYSICAL EXAM:   Vital Signs Last 24 Hrs  T(C): 36.7 (12 Jan 2024 07:34), Max: 36.8 (11 Jan 2024 18:40)  T(F): 98.1 (12 Jan 2024 07:34), Max: 98.2 (11 Jan 2024 18:40)  HR: 93 (12 Jan 2024 07:34) (76 - 100)  BP: 113/56 (12 Jan 2024 07:34) (113/56 - 142/67)  BP(mean): 85 (11 Jan 2024 12:56) (85 - 85)  RR: 18 (12 Jan 2024 07:34) (18 - 19)  SpO2: 96% (12 Jan 2024 07:34) (93% - 97%)    Parameters below as of 12 Jan 2024 07:34  Patient On (Oxygen Delivery Method): room air        General: No acute distress    NEUROLOGICAL EXAM:  Mental status: Awake, alert, oriented x3, speech fluent, follows commands, no neglect, normal memory   Cranial Nerves: No facial asymmetry, no nystagmus, no dysarthria,  tongue midline  Motor exam: Normal tone, no drift, 5/5 RUE, 5/5 RLE, 5/5 LUE, 5/5 LLE, normal fine finger movements.  Sensation: Intact to light touch   Coordination/ Gait: No dysmetria, gait not tested    LABS:                        10.1   8.32  )-----------( 255      ( 11 Jan 2024 15:51 )             34.2    01-12    141  |  101  |  10.4  ----------------------------<  100<H>  3.2<L>   |  22.0  |  0.46<L>    Ca    9.0      12 Jan 2024 07:06  Phos  3.3     01-12  Mg     1.7     01-12    TPro  6.2<L>  /  Alb  4.2  /  TBili  0.4  /  DBili  x   /  AST  29  /  ALT  19  /  AlkPhos  82  01-12  PT/INR - ( 11 Jan 2024 09:43 )   PT: 10.3 sec;   INR: 0.93 ratio         PTT - ( 11 Jan 2024 09:43 )  PTT:22.5 sec      IMAGING: Reviewed by me.      Preliminary note, offical recommendations pending attending review/signature     [facility] [place] Stroke Team  Progress Note     HPI:  72 y/o F w/ PMH of bipolar dx vs schizo affective disorder, HTN, HLD, DMII who presented s/p witnessed fall with garbled speech and altered mental status. Per EMS, the pt was making breakfast at 0850 1/11/24   when she suddenly became unable to speak, fell down,, and the pt's home health aide called 911. Pt initially presented to the ED completely aphasic w/ only moaning and no intelligible speech, and R facial droop, but moving all extremities. At the time of this consult, the pt had been given Versed IVP for sedation due to agitation. After ED team spoke with the pt's daughter via phone, it was established that the patient had "difficulty speaking" and was confused for ~1 week and LKW was no longer clear. Pt admitted for stroke workup.    Admission NIHSS: 7    SUBJECTIVE: No events overnight.  No new neurologic complaints.  ROS reported negative unless otherwise noted.    MEDICATIONS:  acetaminophen     Tablet .. 650 milliGRAM(s) Oral every 6 hours PRN  amLODIPine   Tablet 5 milliGRAM(s) Oral daily  ARIPiprazole 30 milliGRAM(s) Oral daily  aspirin enteric coated 81 milliGRAM(s) Oral daily  atorvastatin 80 milliGRAM(s) Oral at bedtime  clopidogrel Tablet 75 milliGRAM(s) Oral daily  dextrose 5%. 1000 milliLiter(s) IV Continuous <Continuous>  dextrose 5%. 1000 milliLiter(s) IV Continuous <Continuous>  dextrose 50% Injectable 25 Gram(s) IV Push once  dextrose 50% Injectable 12.5 Gram(s) IV Push once  dextrose 50% Injectable 25 Gram(s) IV Push once  dextrose Oral Gel 15 Gram(s) Oral once PRN  enoxaparin Injectable 40 milliGRAM(s) SubCutaneous every 24 hours  glucagon  Injectable 1 milliGRAM(s) IntraMuscular once  insulin lispro (ADMELOG) corrective regimen sliding scale   SubCutaneous three times a day before meals  iron sucrose IVPB 200 milliGRAM(s) IV Intermittent every 24 hours  magnesium sulfate  IVPB 2 Gram(s) IV Intermittent once  perphenazine 8 milliGRAM(s) Oral at bedtime  potassium chloride    Tablet ER 40 milliEquivalent(s) Oral every 4 hours  sodium chloride 0.9%. 1000 milliLiter(s) IV Continuous <Continuous>      PHYSICAL EXAM:   Vital Signs Last 24 Hrs  T(C): 36.7 (12 Jan 2024 07:34), Max: 36.8 (11 Jan 2024 18:40)  T(F): 98.1 (12 Jan 2024 07:34), Max: 98.2 (11 Jan 2024 18:40)  HR: 93 (12 Jan 2024 07:34) (76 - 100)  BP: 113/56 (12 Jan 2024 07:34) (113/56 - 142/67)  BP(mean): 85 (11 Jan 2024 12:56) (85 - 85)  RR: 18 (12 Jan 2024 07:34) (18 - 19)  SpO2: 96% (12 Jan 2024 07:34) (93% - 97%)    Parameters below as of 12 Jan 2024 07:34  Patient On (Oxygen Delivery Method): room air    General: NAD , left nasal ecchymosis     Detailed Neurologic Exam:    Mental status: The patient upon arrival was underneath the covers, arouses briskly to voice and removes and becomes somewhat attentive but at times restless, oriented to self and place,   disoriented to time and states the month is April or May. The patient is able to name objects, follow commands, repeat sentences.    Cranial nerves: Mild dysarthria still present. Pupils equal and react symmetrically to light. Overall attends to movement all visual fields but breaks fixation frequently,  Extraocular motion is full with no nystagmus. There is no ptosis. Facial sensation is intact. Facial symmetry intact.    Motor: There is normal bulk and tone.  There is no tremor.  Strength is 5/5 in the right arm.  Strength is 5/5 in the right leg.  Strength is 5/5 in the left arm.  Strength is 5/5 in the left leg.    Sensation: Intact to light touch and pin in 4 extremities    Cerebellar: There is no gross dysmetria appreciated     Gait : deferred    LABS:                        10.1   8.32  )-----------( 255      ( 11 Jan 2024 15:51 )             34.2    01-12    141  |  101  |  10.4  ----------------------------<  100<H>  3.2<L>   |  22.0  |  0.46<L>    Ca    9.0      12 Jan 2024 07:06  Phos  3.3     01-12  Mg     1.7     01-12    TPro  6.2<L>  /  Alb  4.2  /  TBili  0.4  /  DBili  x   /  AST  29  /  ALT  19  /  AlkPhos  82  01-12  PT/INR - ( 11 Jan 2024 09:43 )   PT: 10.3 sec;   INR: 0.93 ratio         PTT - ( 11 Jan 2024 09:43 )  PTT:22.5 sec    LDL: 35  A1C: 6.2    RADIOLOGY & ADDITIONAL STUDIES (independently reviewed unless otherwise noted):    EEG Classification / Summary: (01.12.24 @ 10:59)  Normal  EEG in the awake, drowsy, asleep state(s).    Clinical Impression:  There were no epileptiform abnormalities recorded.      TTE W or WO Ultrasound Enhancing Agent (01.11.24 @ 17:24)  CONCLUSIONS:     1. Left ventricular cavity is normal. Left ventricular wall thickness is normal. Left ventricular systolic function is hyperdynamic with an ejection fraction visually estimated at 70 to 75 %.   2. There is mild (grade 1) left ventricular diastolic dysfunction, with elevated filling pressure.   3. Normal right ventricular cavity size and normal systolic function.   4. The left atrium is normal.   5. The right atrium is normal in size.   6. There is moderate calcification of the mitral valve annulus.   7. Mild mitral valve leaflet calcification.   8. No mitral regurgitation.   9. Fibrocalcific aortic valve sclerosis without stenosis.  10. Trace tricuspid regurgitation.  11. No pericardial effusion seen.  12. Agitated saline injection was negative for intracardiac shunt.    CT Brain, CTA Brain, CTA Neck, CT Brain Perfusion Stroke Protocol w/ IV Cont (01.11.24 @ 10:03)  IMPRESSION:  CT BRAIN  1. No evidence of acute hemorrhage, territorial infarct or vasogenic   edema.  2. Additional findings described in detail above.    CT PERFUSION  1. No predicted core infarct.  2. No evidence of active ischemia-tissue at risk.    CTA  1. Vertebral arteries patent, with right-sided dominance.  2. No evidence of significant stenosis, occlusion or dissection bilateral   extracranial carotid arteries.  3. No evidence of large vessel occlusion, definitive aneurysm or vascular   malformation intracranial circulation. Additional findings, including   anatomic variants, described above.  4. Additional findings described in detail above.     Preliminary note, offical recommendations pending attending review/signature     [facility] Stroke Team  Progress Note     HPI:  72 y/o F w/ PMH of bipolar dx vs schizo affective disorder, HTN, HLD, DMII who presented s/p witnessed fall with garbled speech and altered mental status. Per EMS, the pt was making breakfast at 0850 1/11/24   when she suddenly became unable to speak, fell down,, and the pt's home health aide called 911. Pt initially presented to the ED completely aphasic w/ only moaning and no intelligible speech, and R facial droop, but moving all extremities. At the time of this consult, the pt had been given Versed IVP for sedation due to agitation. After ED team spoke with the pt's daughter via phone, it was established that the patient had "difficulty speaking" and was confused for ~1 week and LKW was no longer clear. Pt admitted for stroke workup.    Admission NIHSS: 7    SUBJECTIVE: No events overnight.  No new neurologic complaints.  ROS reported negative unless otherwise noted.    MEDICATIONS:  acetaminophen     Tablet .. 650 milliGRAM(s) Oral every 6 hours PRN  amLODIPine   Tablet 5 milliGRAM(s) Oral daily  ARIPiprazole 30 milliGRAM(s) Oral daily  aspirin enteric coated 81 milliGRAM(s) Oral daily  atorvastatin 80 milliGRAM(s) Oral at bedtime  clopidogrel Tablet 75 milliGRAM(s) Oral daily  dextrose 5%. 1000 milliLiter(s) IV Continuous <Continuous>  dextrose 5%. 1000 milliLiter(s) IV Continuous <Continuous>  dextrose 50% Injectable 25 Gram(s) IV Push once  dextrose 50% Injectable 12.5 Gram(s) IV Push once  dextrose 50% Injectable 25 Gram(s) IV Push once  dextrose Oral Gel 15 Gram(s) Oral once PRN  enoxaparin Injectable 40 milliGRAM(s) SubCutaneous every 24 hours  glucagon  Injectable 1 milliGRAM(s) IntraMuscular once  insulin lispro (ADMELOG) corrective regimen sliding scale   SubCutaneous three times a day before meals  iron sucrose IVPB 200 milliGRAM(s) IV Intermittent every 24 hours  magnesium sulfate  IVPB 2 Gram(s) IV Intermittent once  perphenazine 8 milliGRAM(s) Oral at bedtime  potassium chloride    Tablet ER 40 milliEquivalent(s) Oral every 4 hours  sodium chloride 0.9%. 1000 milliLiter(s) IV Continuous <Continuous>      PHYSICAL EXAM:   Vital Signs Last 24 Hrs  T(C): 36.7 (12 Jan 2024 07:34), Max: 36.8 (11 Jan 2024 18:40)  T(F): 98.1 (12 Jan 2024 07:34), Max: 98.2 (11 Jan 2024 18:40)  HR: 93 (12 Jan 2024 07:34) (76 - 100)  BP: 113/56 (12 Jan 2024 07:34) (113/56 - 142/67)  BP(mean): 85 (11 Jan 2024 12:56) (85 - 85)  RR: 18 (12 Jan 2024 07:34) (18 - 19)  SpO2: 96% (12 Jan 2024 07:34) (93% - 97%)    Parameters below as of 12 Jan 2024 07:34  Patient On (Oxygen Delivery Method): room air    General: NAD , left nasal ecchymosis     Detailed Neurologic Exam:    Mental status: The patient upon arrival was underneath the covers, arouses briskly to voice and removes and becomes somewhat attentive but at times restless, oriented to self and place,   disoriented to time and states the month is April or May. The patient is able to name objects, follow commands, repeat sentences.    Cranial nerves: Mild dysarthria still present. Pupils equal and react symmetrically to light. Overall attends to movement all visual fields but breaks fixation frequently,  Extraocular motion is full with no nystagmus. There is no ptosis. Facial sensation is intact. Facial symmetry intact.    Motor: There is normal bulk and tone.  There is no tremor.  Strength is 5/5 in the right arm.  Strength is 5/5 in the right leg.  Strength is 5/5 in the left arm.  Strength is 5/5 in the left leg.    Sensation: Intact to light touch and pin in 4 extremities    Cerebellar: There is no gross dysmetria appreciated     Gait : deferred    LABS:                        10.1   8.32  )-----------( 255      ( 11 Jan 2024 15:51 )             34.2    01-12    141  |  101  |  10.4  ----------------------------<  100<H>  3.2<L>   |  22.0  |  0.46<L>    Ca    9.0      12 Jan 2024 07:06  Phos  3.3     01-12  Mg     1.7     01-12    TPro  6.2<L>  /  Alb  4.2  /  TBili  0.4  /  DBili  x   /  AST  29  /  ALT  19  /  AlkPhos  82  01-12  PT/INR - ( 11 Jan 2024 09:43 )   PT: 10.3 sec;   INR: 0.93 ratio         PTT - ( 11 Jan 2024 09:43 )  PTT:22.5 sec    LDL: 35  A1C: 6.2    RADIOLOGY & ADDITIONAL STUDIES (independently reviewed unless otherwise noted):    EEG Classification / Summary: (01.12.24 @ 10:59)  Normal  EEG in the awake, drowsy, asleep state(s).    Clinical Impression:  There were no epileptiform abnormalities recorded.      TTE W or WO Ultrasound Enhancing Agent (01.11.24 @ 17:24)  CONCLUSIONS:     1. Left ventricular cavity is normal. Left ventricular wall thickness is normal. Left ventricular systolic function is hyperdynamic with an ejection fraction visually estimated at 70 to 75 %.   2. There is mild (grade 1) left ventricular diastolic dysfunction, with elevated filling pressure.   3. Normal right ventricular cavity size and normal systolic function.   4. The left atrium is normal.   5. The right atrium is normal in size.   6. There is moderate calcification of the mitral valve annulus.   7. Mild mitral valve leaflet calcification.   8. No mitral regurgitation.   9. Fibrocalcific aortic valve sclerosis without stenosis.  10. Trace tricuspid regurgitation.  11. No pericardial effusion seen.  12. Agitated saline injection was negative for intracardiac shunt.    CT Brain, CTA Brain, CTA Neck, CT Brain Perfusion Stroke Protocol w/ IV Cont (01.11.24 @ 10:03)  IMPRESSION:  CT BRAIN  1. No evidence of acute hemorrhage, territorial infarct or vasogenic   edema.  2. Additional findings described in detail above.    CT PERFUSION  1. No predicted core infarct.  2. No evidence of active ischemia-tissue at risk.    CTA  1. Vertebral arteries patent, with right-sided dominance.  2. No evidence of significant stenosis, occlusion or dissection bilateral   extracranial carotid arteries.  3. No evidence of large vessel occlusion, definitive aneurysm or vascular   malformation intracranial circulation. Additional findings, including   anatomic variants, described above.  4. Additional findings described in detail above.     Preliminary note, offical recommendations pending attending review/signature     [facility] Stroke Team  Progress Note     HPI:  72 y/o F w/ PMH of bipolar dx vs schizo affective disorder, HTN, HLD, DMII who presented s/p witnessed fall with garbled speech and altered mental status. Per EMS, the pt was making breakfast at 0850 1/11/24   when she suddenly became unable to speak, fell down,, and the pt's home health aide called 911. Pt initially presented to the ED completely aphasic w/ only moaning and no intelligible speech, and R facial droop, but moving all extremities. At the time of initial consult, the pt had been given Versed IVP for sedation due to agitation. After ED team spoke with the pt's daughter via phone, it was established that the patient had "difficulty speaking" and was confused for ~1 week and LKW was no longer clear. Pt admitted for stroke workup.    Admission NIHSS: 7    SUBJECTIVE: No events overnight.  No new neurologic complaints.  ROS reported negative unless otherwise noted.    MEDICATIONS:  acetaminophen     Tablet .. 650 milliGRAM(s) Oral every 6 hours PRN  amLODIPine   Tablet 5 milliGRAM(s) Oral daily  ARIPiprazole 30 milliGRAM(s) Oral daily  aspirin enteric coated 81 milliGRAM(s) Oral daily  atorvastatin 80 milliGRAM(s) Oral at bedtime  clopidogrel Tablet 75 milliGRAM(s) Oral daily  dextrose 5%. 1000 milliLiter(s) IV Continuous <Continuous>  dextrose 5%. 1000 milliLiter(s) IV Continuous <Continuous>  dextrose 50% Injectable 25 Gram(s) IV Push once  dextrose 50% Injectable 12.5 Gram(s) IV Push once  dextrose 50% Injectable 25 Gram(s) IV Push once  dextrose Oral Gel 15 Gram(s) Oral once PRN  enoxaparin Injectable 40 milliGRAM(s) SubCutaneous every 24 hours  glucagon  Injectable 1 milliGRAM(s) IntraMuscular once  insulin lispro (ADMELOG) corrective regimen sliding scale   SubCutaneous three times a day before meals  iron sucrose IVPB 200 milliGRAM(s) IV Intermittent every 24 hours  magnesium sulfate  IVPB 2 Gram(s) IV Intermittent once  perphenazine 8 milliGRAM(s) Oral at bedtime  potassium chloride    Tablet ER 40 milliEquivalent(s) Oral every 4 hours  sodium chloride 0.9%. 1000 milliLiter(s) IV Continuous <Continuous>      PHYSICAL EXAM:   Vital Signs Last 24 Hrs  T(C): 36.7 (12 Jan 2024 07:34), Max: 36.8 (11 Jan 2024 18:40)  T(F): 98.1 (12 Jan 2024 07:34), Max: 98.2 (11 Jan 2024 18:40)  HR: 93 (12 Jan 2024 07:34) (76 - 100)  BP: 113/56 (12 Jan 2024 07:34) (113/56 - 142/67)  BP(mean): 85 (11 Jan 2024 12:56) (85 - 85)  RR: 18 (12 Jan 2024 07:34) (18 - 19)  SpO2: 96% (12 Jan 2024 07:34) (93% - 97%)    Parameters below as of 12 Jan 2024 07:34  Patient On (Oxygen Delivery Method): room air    General: NAD , left nasal ecchymosis     Detailed Neurologic Exam:    Mental status: The patient upon arrival was underneath the covers, arouses briskly to voice and removes and becomes somewhat attentive but at times restless, oriented to self and place,   disoriented to time and states the month is April or May. The patient is able to name objects, follow commands, repeat sentences.    Cranial nerves: Mild dysarthria still present. Pupils equal and react symmetrically to light. Overall attends to movement all visual fields but breaks fixation frequently,  Extraocular motion is full with no nystagmus. There is no ptosis. Facial sensation is intact. Facial symmetry intact.    Motor: There is normal bulk and tone.  There is no tremor.  Strength is 5/5 in the right arm.  Strength is 5/5 in the right leg.  Strength is 5/5 in the left arm.  Strength is 5/5 in the left leg.    Sensation: Intact to light touch and pin in 4 extremities    Cerebellar: There is no gross dysmetria appreciated     Gait : deferred    LABS:                        10.1   8.32  )-----------( 255      ( 11 Jan 2024 15:51 )             34.2    01-12    141  |  101  |  10.4  ----------------------------<  100<H>  3.2<L>   |  22.0  |  0.46<L>    Ca    9.0      12 Jan 2024 07:06  Phos  3.3     01-12  Mg     1.7     01-12    TPro  6.2<L>  /  Alb  4.2  /  TBili  0.4  /  DBili  x   /  AST  29  /  ALT  19  /  AlkPhos  82  01-12  PT/INR - ( 11 Jan 2024 09:43 )   PT: 10.3 sec;   INR: 0.93 ratio         PTT - ( 11 Jan 2024 09:43 )  PTT:22.5 sec    LDL: 35  A1C: 6.2    RADIOLOGY & ADDITIONAL STUDIES (independently reviewed unless otherwise noted):    EEG Classification / Summary: (01.12.24 @ 10:59)  Normal  EEG in the awake, drowsy, asleep state(s).    Clinical Impression:  There were no epileptiform abnormalities recorded.      TTE W or WO Ultrasound Enhancing Agent (01.11.24 @ 17:24)  CONCLUSIONS:     1. Left ventricular cavity is normal. Left ventricular wall thickness is normal. Left ventricular systolic function is hyperdynamic with an ejection fraction visually estimated at 70 to 75 %.   2. There is mild (grade 1) left ventricular diastolic dysfunction, with elevated filling pressure.   3. Normal right ventricular cavity size and normal systolic function.   4. The left atrium is normal.   5. The right atrium is normal in size.   6. There is moderate calcification of the mitral valve annulus.   7. Mild mitral valve leaflet calcification.   8. No mitral regurgitation.   9. Fibrocalcific aortic valve sclerosis without stenosis.  10. Trace tricuspid regurgitation.  11. No pericardial effusion seen.  12. Agitated saline injection was negative for intracardiac shunt.    CT Brain, CTA Brain, CTA Neck, CT Brain Perfusion Stroke Protocol w/ IV Cont (01.11.24 @ 10:03)  IMPRESSION:  CT BRAIN  1. No evidence of acute hemorrhage, territorial infarct or vasogenic   edema.  2. Additional findings described in detail above.    CT PERFUSION  1. No predicted core infarct.  2. No evidence of active ischemia-tissue at risk.    CTA  1. Vertebral arteries patent, with right-sided dominance.  2. No evidence of significant stenosis, occlusion or dissection bilateral   extracranial carotid arteries.  3. No evidence of large vessel occlusion, definitive aneurysm or vascular   malformation intracranial circulation. Additional findings, including   anatomic variants, described above.  4. Additional findings described in detail above.     Preliminary note, offical recommendations pending attending review/signature     [facility] Stroke Team  Progress Note     HPI:  74 y/o F w/ PMH of bipolar dx vs schizo affective disorder, HTN, HLD, DMII who presented s/p witnessed fall with garbled speech and altered mental status. Per EMS, the pt was making breakfast at 0850 1/11/24   when she suddenly became unable to speak, fell down,, and the pt's home health aide called 911. Pt initially presented to the ED completely aphasic w/ only moaning and no intelligible speech, and R facial droop, but moving all extremities. At the time of initial consult, the pt had been given Versed IVP for sedation due to agitation. After ED team spoke with the pt's daughter via phone, it was established that the patient had "difficulty speaking" and was confused for ~1 week and LKW was no longer clear. Pt admitted for stroke workup.    Admission NIHSS: 7    SUBJECTIVE: No events overnight.  No new neurologic complaints.  ROS reported negative unless otherwise noted.    MEDICATIONS:  acetaminophen     Tablet .. 650 milliGRAM(s) Oral every 6 hours PRN  amLODIPine   Tablet 5 milliGRAM(s) Oral daily  ARIPiprazole 30 milliGRAM(s) Oral daily  aspirin enteric coated 81 milliGRAM(s) Oral daily  atorvastatin 80 milliGRAM(s) Oral at bedtime  clopidogrel Tablet 75 milliGRAM(s) Oral daily  dextrose 5%. 1000 milliLiter(s) IV Continuous <Continuous>  dextrose 5%. 1000 milliLiter(s) IV Continuous <Continuous>  dextrose 50% Injectable 25 Gram(s) IV Push once  dextrose 50% Injectable 12.5 Gram(s) IV Push once  dextrose 50% Injectable 25 Gram(s) IV Push once  dextrose Oral Gel 15 Gram(s) Oral once PRN  enoxaparin Injectable 40 milliGRAM(s) SubCutaneous every 24 hours  glucagon  Injectable 1 milliGRAM(s) IntraMuscular once  insulin lispro (ADMELOG) corrective regimen sliding scale   SubCutaneous three times a day before meals  iron sucrose IVPB 200 milliGRAM(s) IV Intermittent every 24 hours  magnesium sulfate  IVPB 2 Gram(s) IV Intermittent once  perphenazine 8 milliGRAM(s) Oral at bedtime  potassium chloride    Tablet ER 40 milliEquivalent(s) Oral every 4 hours  sodium chloride 0.9%. 1000 milliLiter(s) IV Continuous <Continuous>      PHYSICAL EXAM:   Vital Signs Last 24 Hrs  T(C): 36.7 (12 Jan 2024 07:34), Max: 36.8 (11 Jan 2024 18:40)  T(F): 98.1 (12 Jan 2024 07:34), Max: 98.2 (11 Jan 2024 18:40)  HR: 93 (12 Jan 2024 07:34) (76 - 100)  BP: 113/56 (12 Jan 2024 07:34) (113/56 - 142/67)  BP(mean): 85 (11 Jan 2024 12:56) (85 - 85)  RR: 18 (12 Jan 2024 07:34) (18 - 19)  SpO2: 96% (12 Jan 2024 07:34) (93% - 97%)    Parameters below as of 12 Jan 2024 07:34  Patient On (Oxygen Delivery Method): room air    General: NAD , left nasal ecchymosis     Detailed Neurologic Exam:    Mental status: The patient upon arrival was underneath the covers, arouses briskly to voice and removes and becomes somewhat attentive but at times restless, oriented to self and place,   disoriented to time and states the month is April or May. The patient is able to name objects, follow commands, repeat sentences.    Cranial nerves: Mild dysarthria still present. Pupils equal and react symmetrically to light. Overall attends to movement all visual fields but breaks fixation frequently,  Extraocular motion is full with no nystagmus. There is no ptosis. Facial sensation is intact. Facial symmetry intact.    Motor: There is normal bulk and tone.  There is no tremor.  Strength is 5/5 in the right arm.  Strength is 5/5 in the right leg.  Strength is 5/5 in the left arm.  Strength is 5/5 in the left leg.    Sensation: Intact to light touch and pin in 4 extremities    Cerebellar: There is no gross dysmetria appreciated     Gait : deferred    LABS:                        10.1   8.32  )-----------( 255      ( 11 Jan 2024 15:51 )             34.2    01-12    141  |  101  |  10.4  ----------------------------<  100<H>  3.2<L>   |  22.0  |  0.46<L>    Ca    9.0      12 Jan 2024 07:06  Phos  3.3     01-12  Mg     1.7     01-12    TPro  6.2<L>  /  Alb  4.2  /  TBili  0.4  /  DBili  x   /  AST  29  /  ALT  19  /  AlkPhos  82  01-12  PT/INR - ( 11 Jan 2024 09:43 )   PT: 10.3 sec;   INR: 0.93 ratio         PTT - ( 11 Jan 2024 09:43 )  PTT:22.5 sec    LDL: 35  A1C: 6.2    RADIOLOGY & ADDITIONAL STUDIES (independently reviewed unless otherwise noted):    EEG Classification / Summary: (01.12.24 @ 10:59)  Normal  EEG in the awake, drowsy, asleep state(s).    Clinical Impression:  There were no epileptiform abnormalities recorded.      TTE W or WO Ultrasound Enhancing Agent (01.11.24 @ 17:24)  CONCLUSIONS:     1. Left ventricular cavity is normal. Left ventricular wall thickness is normal. Left ventricular systolic function is hyperdynamic with an ejection fraction visually estimated at 70 to 75 %.   2. There is mild (grade 1) left ventricular diastolic dysfunction, with elevated filling pressure.   3. Normal right ventricular cavity size and normal systolic function.   4. The left atrium is normal.   5. The right atrium is normal in size.   6. There is moderate calcification of the mitral valve annulus.   7. Mild mitral valve leaflet calcification.   8. No mitral regurgitation.   9. Fibrocalcific aortic valve sclerosis without stenosis.  10. Trace tricuspid regurgitation.  11. No pericardial effusion seen.  12. Agitated saline injection was negative for intracardiac shunt.    CT Brain, CTA Brain, CTA Neck, CT Brain Perfusion Stroke Protocol w/ IV Cont (01.11.24 @ 10:03)  IMPRESSION:  CT BRAIN  1. No evidence of acute hemorrhage, territorial infarct or vasogenic   edema.  2. Additional findings described in detail above.    CT PERFUSION  1. No predicted core infarct.  2. No evidence of active ischemia-tissue at risk.    CTA  1. Vertebral arteries patent, with right-sided dominance.  2. No evidence of significant stenosis, occlusion or dissection bilateral   extracranial carotid arteries.  3. No evidence of large vessel occlusion, definitive aneurysm or vascular   malformation intracranial circulation. Additional findings, including   anatomic variants, described above.  4. Additional findings described in detail above.     Preliminary note, offical recommendations pending attending review/signature     [facility] Stroke Team  Progress Note     HPI:  74 y/o F w/ PMH of bipolar dx vs schizo affective disorder, HTN, HLD, DMII who presented s/p witnessed fall with garbled speech and altered mental status. Per EMS, the pt was making breakfast at 0850 1/11/24   when she suddenly became unable to speak, fell down,, and the pt's home health aide called 911. Pt initially presented to the ED completely aphasic w/ only moaning and no intelligible speech, and R facial droop, but moving all extremities. At the time of initial consult, the pt had been given Versed IVP for sedation due to agitation. After ED team spoke with the pt's daughter via phone, it was established that the patient had "difficulty speaking" and was confused for ~1 week and LKW was no longer clear. Pt admitted for stroke workup.    Admission NIHSS: 7    SUBJECTIVE: No events overnight.  No new neurologic complaints.  ROS reported negative unless otherwise noted.    MEDICATIONS:  acetaminophen     Tablet .. 650 milliGRAM(s) Oral every 6 hours PRN  amLODIPine   Tablet 5 milliGRAM(s) Oral daily  ARIPiprazole 30 milliGRAM(s) Oral daily  aspirin enteric coated 81 milliGRAM(s) Oral daily  atorvastatin 80 milliGRAM(s) Oral at bedtime  clopidogrel Tablet 75 milliGRAM(s) Oral daily  dextrose 5%. 1000 milliLiter(s) IV Continuous <Continuous>  dextrose 5%. 1000 milliLiter(s) IV Continuous <Continuous>  dextrose 50% Injectable 25 Gram(s) IV Push once  dextrose 50% Injectable 12.5 Gram(s) IV Push once  dextrose 50% Injectable 25 Gram(s) IV Push once  dextrose Oral Gel 15 Gram(s) Oral once PRN  enoxaparin Injectable 40 milliGRAM(s) SubCutaneous every 24 hours  glucagon  Injectable 1 milliGRAM(s) IntraMuscular once  insulin lispro (ADMELOG) corrective regimen sliding scale   SubCutaneous three times a day before meals  iron sucrose IVPB 200 milliGRAM(s) IV Intermittent every 24 hours  magnesium sulfate  IVPB 2 Gram(s) IV Intermittent once  perphenazine 8 milliGRAM(s) Oral at bedtime  potassium chloride    Tablet ER 40 milliEquivalent(s) Oral every 4 hours  sodium chloride 0.9%. 1000 milliLiter(s) IV Continuous <Continuous>      PHYSICAL EXAM:   Vital Signs Last 24 Hrs  T(C): 36.7 (12 Jan 2024 07:34), Max: 36.8 (11 Jan 2024 18:40)  T(F): 98.1 (12 Jan 2024 07:34), Max: 98.2 (11 Jan 2024 18:40)  HR: 93 (12 Jan 2024 07:34) (76 - 100)  BP: 113/56 (12 Jan 2024 07:34) (113/56 - 142/67)  BP(mean): 85 (11 Jan 2024 12:56) (85 - 85)  RR: 18 (12 Jan 2024 07:34) (18 - 19)  SpO2: 96% (12 Jan 2024 07:34) (93% - 97%)    Parameters below as of 12 Jan 2024 07:34  Patient On (Oxygen Delivery Method): room air    General: NAD , left nasal ecchymosis     Neurologic Exam:    Mental status: The patient upon arrival was sitting up in bed, alert and oriented to self, time, place, and situation. The patient is able to name objects, follow commands, repeat sentences.    Cranial nerves: Mild dysarthria still present. Pupils equal and react symmetrically to light. Attends to movement all visual fields. Extraocular motion is full with no nystagmus. There is no ptosis. Facial sensation is intact. Facial symmetry intact.    Motor: There is normal bulk and tone.  There is no tremor.  Strength is 5/5 in the right arm.  Strength is 5/5 in the right leg.  Strength is 5/5 in the left arm.  Strength is 5/5 in the left leg.    Sensation: Intact to light touch and pin in 4 extremities    Cerebellar: There is no gross dysmetria appreciated     Gait : deferred    LABS:                        10.1   8.32  )-----------( 255      ( 11 Jan 2024 15:51 )             34.2    01-12    141  |  101  |  10.4  ----------------------------<  100<H>  3.2<L>   |  22.0  |  0.46<L>    Ca    9.0      12 Jan 2024 07:06  Phos  3.3     01-12  Mg     1.7     01-12    TPro  6.2<L>  /  Alb  4.2  /  TBili  0.4  /  DBili  x   /  AST  29  /  ALT  19  /  AlkPhos  82  01-12  PT/INR - ( 11 Jan 2024 09:43 )   PT: 10.3 sec;   INR: 0.93 ratio         PTT - ( 11 Jan 2024 09:43 )  PTT:22.5 sec    LDL: 35  A1C: 6.2    RADIOLOGY & ADDITIONAL STUDIES (independently reviewed unless otherwise noted):    EEG Classification / Summary: (01.12.24 @ 10:59)  Normal  EEG in the awake, drowsy, asleep state(s).    Clinical Impression:  There were no epileptiform abnormalities recorded.      TTE W or WO Ultrasound Enhancing Agent (01.11.24 @ 17:24)  CONCLUSIONS:     1. Left ventricular cavity is normal. Left ventricular wall thickness is normal. Left ventricular systolic function is hyperdynamic with an ejection fraction visually estimated at 70 to 75 %.   2. There is mild (grade 1) left ventricular diastolic dysfunction, with elevated filling pressure.   3. Normal right ventricular cavity size and normal systolic function.   4. The left atrium is normal.   5. The right atrium is normal in size.   6. There is moderate calcification of the mitral valve annulus.   7. Mild mitral valve leaflet calcification.   8. No mitral regurgitation.   9. Fibrocalcific aortic valve sclerosis without stenosis.  10. Trace tricuspid regurgitation.  11. No pericardial effusion seen.  12. Agitated saline injection was negative for intracardiac shunt.    CT Brain, CTA Brain, CTA Neck, CT Brain Perfusion Stroke Protocol w/ IV Cont (01.11.24 @ 10:03)  IMPRESSION:  CT BRAIN  1. No evidence of acute hemorrhage, territorial infarct or vasogenic   edema.  2. Additional findings described in detail above.    CT PERFUSION  1. No predicted core infarct.  2. No evidence of active ischemia-tissue at risk.    CTA  1. Vertebral arteries patent, with right-sided dominance.  2. No evidence of significant stenosis, occlusion or dissection bilateral   extracranial carotid arteries.  3. No evidence of large vessel occlusion, definitive aneurysm or vascular   malformation intracranial circulation. Additional findings, including   anatomic variants, described above.  4. Additional findings described in detail above.     Preliminary note, offical recommendations pending attending review/signature     [facility] Stroke Team  Progress Note     HPI:  74 y/o F w/ PMH of bipolar dx vs schizo affective disorder, HTN, HLD, DMII who presented s/p witnessed fall with garbled speech and altered mental status. Per EMS, the pt was making breakfast at 0850 1/11/24   when she suddenly became unable to speak, fell down,, and the pt's home health aide called 911. Pt initially presented to the ED completely aphasic w/ only moaning and no intelligible speech, and R facial droop, but moving all extremities. At the time of initial consult, the pt had been given Versed IVP for sedation due to agitation. After ED team spoke with the pt's daughter via phone, it was established that the patient had "difficulty speaking" and was confused for ~1 week and LKW was no longer clear. Pt admitted for stroke workup.    Admission NIHSS: 7    SUBJECTIVE: No events overnight.  No new neurologic complaints.  ROS reported negative unless otherwise noted.    MEDICATIONS:  acetaminophen     Tablet .. 650 milliGRAM(s) Oral every 6 hours PRN  amLODIPine   Tablet 5 milliGRAM(s) Oral daily  ARIPiprazole 30 milliGRAM(s) Oral daily  aspirin enteric coated 81 milliGRAM(s) Oral daily  atorvastatin 80 milliGRAM(s) Oral at bedtime  clopidogrel Tablet 75 milliGRAM(s) Oral daily  dextrose 5%. 1000 milliLiter(s) IV Continuous <Continuous>  dextrose 5%. 1000 milliLiter(s) IV Continuous <Continuous>  dextrose 50% Injectable 25 Gram(s) IV Push once  dextrose 50% Injectable 12.5 Gram(s) IV Push once  dextrose 50% Injectable 25 Gram(s) IV Push once  dextrose Oral Gel 15 Gram(s) Oral once PRN  enoxaparin Injectable 40 milliGRAM(s) SubCutaneous every 24 hours  glucagon  Injectable 1 milliGRAM(s) IntraMuscular once  insulin lispro (ADMELOG) corrective regimen sliding scale   SubCutaneous three times a day before meals  iron sucrose IVPB 200 milliGRAM(s) IV Intermittent every 24 hours  magnesium sulfate  IVPB 2 Gram(s) IV Intermittent once  perphenazine 8 milliGRAM(s) Oral at bedtime  potassium chloride    Tablet ER 40 milliEquivalent(s) Oral every 4 hours  sodium chloride 0.9%. 1000 milliLiter(s) IV Continuous <Continuous>      PHYSICAL EXAM:   Vital Signs Last 24 Hrs  T(C): 36.7 (12 Jan 2024 07:34), Max: 36.8 (11 Jan 2024 18:40)  T(F): 98.1 (12 Jan 2024 07:34), Max: 98.2 (11 Jan 2024 18:40)  HR: 93 (12 Jan 2024 07:34) (76 - 100)  BP: 113/56 (12 Jan 2024 07:34) (113/56 - 142/67)  BP(mean): 85 (11 Jan 2024 12:56) (85 - 85)  RR: 18 (12 Jan 2024 07:34) (18 - 19)  SpO2: 96% (12 Jan 2024 07:34) (93% - 97%)    Parameters below as of 12 Jan 2024 07:34  Patient On (Oxygen Delivery Method): room air    General: NAD , left nasal ecchymosis     Neurologic Exam:    Mental status: The patient upon arrival was sitting up in bed, alert and oriented to self, time, place, and situation. The patient is able to name objects, follow commands, repeat sentences.    Cranial nerves: Mild dysarthria still present. Pupils equal and react symmetrically to light. Attends to movement all visual fields. Extraocular motion is full with no nystagmus. There is no ptosis. Facial sensation is intact. Facial symmetry intact.    Motor: There is normal bulk and tone.  There is no tremor.  Strength is 5/5 in the right arm.  Strength is 5/5 in the right leg.  Strength is 5/5 in the left arm.  Strength is 5/5 in the left leg.    Sensation: Intact to light touch and pin in 4 extremities    Cerebellar: There is no gross dysmetria appreciated     Gait : deferred    LABS:                        10.1   8.32  )-----------( 255      ( 11 Jan 2024 15:51 )             34.2    01-12    141  |  101  |  10.4  ----------------------------<  100<H>  3.2<L>   |  22.0  |  0.46<L>    Ca    9.0      12 Jan 2024 07:06  Phos  3.3     01-12  Mg     1.7     01-12    TPro  6.2<L>  /  Alb  4.2  /  TBili  0.4  /  DBili  x   /  AST  29  /  ALT  19  /  AlkPhos  82  01-12  PT/INR - ( 11 Jan 2024 09:43 )   PT: 10.3 sec;   INR: 0.93 ratio         PTT - ( 11 Jan 2024 09:43 )  PTT:22.5 sec    LDL: 35  A1C: 6.2    RADIOLOGY & ADDITIONAL STUDIES (independently reviewed unless otherwise noted):    EEG Classification / Summary: (01.12.24 @ 10:59)  Normal  EEG in the awake, drowsy, asleep state(s).    Clinical Impression:  There were no epileptiform abnormalities recorded.      TTE W or WO Ultrasound Enhancing Agent (01.11.24 @ 17:24)  CONCLUSIONS:     1. Left ventricular cavity is normal. Left ventricular wall thickness is normal. Left ventricular systolic function is hyperdynamic with an ejection fraction visually estimated at 70 to 75 %.   2. There is mild (grade 1) left ventricular diastolic dysfunction, with elevated filling pressure.   3. Normal right ventricular cavity size and normal systolic function.   4. The left atrium is normal.   5. The right atrium is normal in size.   6. There is moderate calcification of the mitral valve annulus.   7. Mild mitral valve leaflet calcification.   8. No mitral regurgitation.   9. Fibrocalcific aortic valve sclerosis without stenosis.  10. Trace tricuspid regurgitation.  11. No pericardial effusion seen.  12. Agitated saline injection was negative for intracardiac shunt.    CT Brain, CTA Brain, CTA Neck, CT Brain Perfusion Stroke Protocol w/ IV Cont (01.11.24 @ 10:03)  IMPRESSION:  CT BRAIN  1. No evidence of acute hemorrhage, territorial infarct or vasogenic   edema.  2. Additional findings described in detail above.    CT PERFUSION  1. No predicted core infarct.  2. No evidence of active ischemia-tissue at risk.    CTA  1. Vertebral arteries patent, with right-sided dominance.  2. No evidence of significant stenosis, occlusion or dissection bilateral   extracranial carotid arteries.  3. No evidence of large vessel occlusion, definitive aneurysm or vascular   malformation intracranial circulation. Additional findings, including   anatomic variants, described above.  4. Additional findings described in detail above.       [facility] Stroke Team  Progress Note     HPI:  74 y/o F w/ PMH of bipolar dx vs schizo affective disorder, HTN, HLD, DMII who presented s/p witnessed fall with garbled speech and altered mental status. Per EMS, the pt was making breakfast at 0850 1/11/24   when she suddenly became unable to speak, fell down,, and the pt's home health aide called 911. Pt initially presented to the ED completely aphasic w/ only moaning and no intelligible speech, and R facial droop, but moving all extremities. At the time of initial consult, the pt had been given Versed IVP for sedation due to agitation. After ED team spoke with the pt's daughter via phone, it was established that the patient had "difficulty speaking" and was confused for ~1 week and LKW was no longer clear. Pt admitted for stroke workup.    Admission NIHSS: 7    SUBJECTIVE: No events overnight.  No new neurologic complaints.  ROS reported negative unless otherwise noted.    MEDICATIONS:  acetaminophen     Tablet .. 650 milliGRAM(s) Oral every 6 hours PRN  amLODIPine   Tablet 5 milliGRAM(s) Oral daily  ARIPiprazole 30 milliGRAM(s) Oral daily  aspirin enteric coated 81 milliGRAM(s) Oral daily  atorvastatin 80 milliGRAM(s) Oral at bedtime  clopidogrel Tablet 75 milliGRAM(s) Oral daily  dextrose 5%. 1000 milliLiter(s) IV Continuous <Continuous>  dextrose 5%. 1000 milliLiter(s) IV Continuous <Continuous>  dextrose 50% Injectable 25 Gram(s) IV Push once  dextrose 50% Injectable 12.5 Gram(s) IV Push once  dextrose 50% Injectable 25 Gram(s) IV Push once  dextrose Oral Gel 15 Gram(s) Oral once PRN  enoxaparin Injectable 40 milliGRAM(s) SubCutaneous every 24 hours  glucagon  Injectable 1 milliGRAM(s) IntraMuscular once  insulin lispro (ADMELOG) corrective regimen sliding scale   SubCutaneous three times a day before meals  iron sucrose IVPB 200 milliGRAM(s) IV Intermittent every 24 hours  magnesium sulfate  IVPB 2 Gram(s) IV Intermittent once  perphenazine 8 milliGRAM(s) Oral at bedtime  potassium chloride    Tablet ER 40 milliEquivalent(s) Oral every 4 hours  sodium chloride 0.9%. 1000 milliLiter(s) IV Continuous <Continuous>      PHYSICAL EXAM:   Vital Signs Last 24 Hrs  T(C): 36.7 (12 Jan 2024 07:34), Max: 36.8 (11 Jan 2024 18:40)  T(F): 98.1 (12 Jan 2024 07:34), Max: 98.2 (11 Jan 2024 18:40)  HR: 93 (12 Jan 2024 07:34) (76 - 100)  BP: 113/56 (12 Jan 2024 07:34) (113/56 - 142/67)  BP(mean): 85 (11 Jan 2024 12:56) (85 - 85)  RR: 18 (12 Jan 2024 07:34) (18 - 19)  SpO2: 96% (12 Jan 2024 07:34) (93% - 97%)    Parameters below as of 12 Jan 2024 07:34  Patient On (Oxygen Delivery Method): room air    General: NAD , left nasal ecchymosis     Neurologic Exam:    Mental status: The patient upon arrival was sitting up in bed, alert and oriented to self, time, place, and situation. The patient is able to name objects, follow commands, repeat sentences.    Cranial nerves: Mild dysarthria still present. Pupils equal and react symmetrically to light. Attends to movement all visual fields. Extraocular motion is full with no nystagmus. There is no ptosis. Facial sensation is intact. Facial symmetry intact.    Motor: There is normal bulk and tone.  There is no tremor.  Strength is 5/5 in the right arm.  Strength is 5/5 in the right leg.  Strength is 5/5 in the left arm.  Strength is 5/5 in the left leg.    Sensation: Intact to light touch and pin in 4 extremities    Cerebellar: There is no gross dysmetria appreciated     Gait : deferred    LABS:                        10.1   8.32  )-----------( 255      ( 11 Jan 2024 15:51 )             34.2    01-12    141  |  101  |  10.4  ----------------------------<  100<H>  3.2<L>   |  22.0  |  0.46<L>    Ca    9.0      12 Jan 2024 07:06  Phos  3.3     01-12  Mg     1.7     01-12    TPro  6.2<L>  /  Alb  4.2  /  TBili  0.4  /  DBili  x   /  AST  29  /  ALT  19  /  AlkPhos  82  01-12  PT/INR - ( 11 Jan 2024 09:43 )   PT: 10.3 sec;   INR: 0.93 ratio         PTT - ( 11 Jan 2024 09:43 )  PTT:22.5 sec    LDL: 35  A1C: 6.2    RADIOLOGY & ADDITIONAL STUDIES (independently reviewed unless otherwise noted):    EEG Classification / Summary: (01.12.24 @ 10:59)  Normal  EEG in the awake, drowsy, asleep state(s).    Clinical Impression:  There were no epileptiform abnormalities recorded.      TTE W or WO Ultrasound Enhancing Agent (01.11.24 @ 17:24)  CONCLUSIONS:     1. Left ventricular cavity is normal. Left ventricular wall thickness is normal. Left ventricular systolic function is hyperdynamic with an ejection fraction visually estimated at 70 to 75 %.   2. There is mild (grade 1) left ventricular diastolic dysfunction, with elevated filling pressure.   3. Normal right ventricular cavity size and normal systolic function.   4. The left atrium is normal.   5. The right atrium is normal in size.   6. There is moderate calcification of the mitral valve annulus.   7. Mild mitral valve leaflet calcification.   8. No mitral regurgitation.   9. Fibrocalcific aortic valve sclerosis without stenosis.  10. Trace tricuspid regurgitation.  11. No pericardial effusion seen.  12. Agitated saline injection was negative for intracardiac shunt.    CT Brain, CTA Brain, CTA Neck, CT Brain Perfusion Stroke Protocol w/ IV Cont (01.11.24 @ 10:03)  IMPRESSION:  CT BRAIN  1. No evidence of acute hemorrhage, territorial infarct or vasogenic   edema.  2. Additional findings described in detail above.    CT PERFUSION  1. No predicted core infarct.  2. No evidence of active ischemia-tissue at risk.    CTA  1. Vertebral arteries patent, with right-sided dominance.  2. No evidence of significant stenosis, occlusion or dissection bilateral   extracranial carotid arteries.  3. No evidence of large vessel occlusion, definitive aneurysm or vascular   malformation intracranial circulation. Additional findings, including   anatomic variants, described above.  4. Additional findings described in detail above.       [facility] Stroke Team  Progress Note     HPI:  72 y/o F w/ PMH of bipolar dx vs schizo affective disorder, HTN, HLD, DMII who presented s/p witnessed fall with garbled speech and altered mental status. Per EMS, the pt was making breakfast at 0850 1/11/24   when she suddenly became unable to speak, fell down,, and the pt's home health aide called 911. Pt initially presented to the ED completely aphasic w/ only moaning and no intelligible speech, and R facial droop, but moving all extremities. At the time of initial consult, the pt had been given Versed IVP for sedation due to agitation. After ED team spoke with the pt's daughter via phone, it was established that the patient had "difficulty speaking" and was confused for ~1 week and LKW was no longer clear. Pt admitted for stroke workup.    Admission NIHSS: 7    SUBJECTIVE: No events overnight.  No new neurologic complaints.  ROS reported negative unless otherwise noted.    MEDICATIONS:  acetaminophen     Tablet .. 650 milliGRAM(s) Oral every 6 hours PRN  amLODIPine   Tablet 5 milliGRAM(s) Oral daily  ARIPiprazole 30 milliGRAM(s) Oral daily  aspirin enteric coated 81 milliGRAM(s) Oral daily  atorvastatin 80 milliGRAM(s) Oral at bedtime  clopidogrel Tablet 75 milliGRAM(s) Oral daily  dextrose 5%. 1000 milliLiter(s) IV Continuous <Continuous>  dextrose 5%. 1000 milliLiter(s) IV Continuous <Continuous>  dextrose 50% Injectable 25 Gram(s) IV Push once  dextrose 50% Injectable 12.5 Gram(s) IV Push once  dextrose 50% Injectable 25 Gram(s) IV Push once  dextrose Oral Gel 15 Gram(s) Oral once PRN  enoxaparin Injectable 40 milliGRAM(s) SubCutaneous every 24 hours  glucagon  Injectable 1 milliGRAM(s) IntraMuscular once  insulin lispro (ADMELOG) corrective regimen sliding scale   SubCutaneous three times a day before meals  iron sucrose IVPB 200 milliGRAM(s) IV Intermittent every 24 hours  magnesium sulfate  IVPB 2 Gram(s) IV Intermittent once  perphenazine 8 milliGRAM(s) Oral at bedtime  potassium chloride    Tablet ER 40 milliEquivalent(s) Oral every 4 hours  sodium chloride 0.9%. 1000 milliLiter(s) IV Continuous <Continuous>      PHYSICAL EXAM:   Vital Signs Last 24 Hrs  T(C): 36.7 (12 Jan 2024 07:34), Max: 36.8 (11 Jan 2024 18:40)  T(F): 98.1 (12 Jan 2024 07:34), Max: 98.2 (11 Jan 2024 18:40)  HR: 93 (12 Jan 2024 07:34) (76 - 100)  BP: 113/56 (12 Jan 2024 07:34) (113/56 - 142/67)  BP(mean): 85 (11 Jan 2024 12:56) (85 - 85)  RR: 18 (12 Jan 2024 07:34) (18 - 19)  SpO2: 96% (12 Jan 2024 07:34) (93% - 97%)    Parameters below as of 12 Jan 2024 07:34  Patient On (Oxygen Delivery Method): room air    General: NAD , left nasal ecchymosis     Neurologic Exam:    Mental status: The patient upon arrival was sitting up in bed, alert and oriented to self, time, place, and situation. The patient is able to name objects, follow commands, repeat sentences.    Cranial nerves: Mild dysarthria still present. Pupils equal and react symmetrically to light. Attends to movement all visual fields. Extraocular motion is full with no nystagmus. There is no ptosis. Facial sensation is intact. Facial symmetry intact.    Motor: There is normal bulk and tone.  There is no tremor.  Strength is 5/5 in the right arm.  Strength is 5/5 in the right leg.  Strength is 5/5 in the left arm.  Strength is 5/5 in the left leg.    Sensation: Intact to light touch and pin in 4 extremities    Cerebellar: There is no gross dysmetria appreciated     Gait : deferred    LABS:                        10.1   8.32  )-----------( 255      ( 11 Jan 2024 15:51 )             34.2    01-12    141  |  101  |  10.4  ----------------------------<  100<H>  3.2<L>   |  22.0  |  0.46<L>    Ca    9.0      12 Jan 2024 07:06  Phos  3.3     01-12  Mg     1.7     01-12    TPro  6.2<L>  /  Alb  4.2  /  TBili  0.4  /  DBili  x   /  AST  29  /  ALT  19  /  AlkPhos  82  01-12  PT/INR - ( 11 Jan 2024 09:43 )   PT: 10.3 sec;   INR: 0.93 ratio         PTT - ( 11 Jan 2024 09:43 )  PTT:22.5 sec    LDL: 35  A1C: 6.2    RADIOLOGY & ADDITIONAL STUDIES (independently reviewed unless otherwise noted):    EEG Classification / Summary: (01.12.24 @ 10:59)  Normal  EEG in the awake, drowsy, asleep state(s).    Clinical Impression:  There were no epileptiform abnormalities recorded.      TTE W or WO Ultrasound Enhancing Agent (01.11.24 @ 17:24)  CONCLUSIONS:     1. Left ventricular cavity is normal. Left ventricular wall thickness is normal. Left ventricular systolic function is hyperdynamic with an ejection fraction visually estimated at 70 to 75 %.   2. There is mild (grade 1) left ventricular diastolic dysfunction, with elevated filling pressure.   3. Normal right ventricular cavity size and normal systolic function.   4. The left atrium is normal.   5. The right atrium is normal in size.   6. There is moderate calcification of the mitral valve annulus.   7. Mild mitral valve leaflet calcification.   8. No mitral regurgitation.   9. Fibrocalcific aortic valve sclerosis without stenosis.  10. Trace tricuspid regurgitation.  11. No pericardial effusion seen.  12. Agitated saline injection was negative for intracardiac shunt.    CT Brain, CTA Brain, CTA Neck, CT Brain Perfusion Stroke Protocol w/ IV Cont (01.11.24 @ 10:03)  IMPRESSION:  CT BRAIN  1. No evidence of acute hemorrhage, territorial infarct or vasogenic   edema.  2. Additional findings described in detail above.    CT PERFUSION  1. No predicted core infarct.  2. No evidence of active ischemia-tissue at risk.    CTA  1. Vertebral arteries patent, with right-sided dominance.  2. No evidence of significant stenosis, occlusion or dissection bilateral   extracranial carotid arteries.  3. No evidence of large vessel occlusion, definitive aneurysm or vascular   malformation intracranial circulation. Additional findings, including   anatomic variants, described above.  4. Additional findings described in detail above.

## 2024-01-12 NOTE — PROGRESS NOTE ADULT - ASSESSMENT
ASSESSMENT:   73y y.o woman, PMHx HTN, DM II, on psychotropic medications ? diagnosis, who presented s/p witnessed fall with garbled speech and altered mental status on the morning of 1/11/24. Per EMS, the pt was making breakfast at 0850 that morning when she suddenly fell and possibly was aphasic. At neurological exam patient was post sedation but had what appeared to be a right hemiparesis and confusion. CT head demonstrated no acute infarction or hemorrhage, upon further discussion patient was noted by daughter via telephone call with ER to have had difficulty speaking and confusion over the last week therefor tenecteplase was deferred as outside of window.  CT angiogram head/neck showed upon review intracranial atherosclerotic disease, due to no large vessel occlusion therefor not thrombectomy candidate.     Impression: right hemiparesis and confusion possibly left hemispheric dysfunction, rule out stroke, would consider alternate etiologies of encephalopathy as well including but not limited to seizure and infectious process.     NEURO:   -Neurologically, Continue close monitoring for neurologic deterioration    -Stroke neuro checks q 2h  -Permissive HTN, SBP range ~120-130mmHg, goal 130-160mmhg for now given atherosclerotic disease, avoid rapid fluctuations and hypotension   -Obtain lipid panel and A1C   -Titrate statin to LDL goal less than 70  -Obtain MRI Brain w/o  -12 hr vEEG, maintain if evidence of epileptiform abnormality .   -BAL negative   -*ANTITHROMBOTIC THERAPY: ASA 81mg daily, add Clopidogrel 75mg daily for anticipated 90 day course if not contraindicated.    -Dysphagia screen: FAIL due to lethargy 1/11/24 @ 1028, maintain NPO per protocol  -- speech/swallow  c/s to follow  -Physical therapy/OT/Speech eval/treatment.     CARDIOVASCULAR:   -Obtain ECG if not already performed  -Repeat troponin if indicated to r/o cardiac cause of syncope  -Check TTE   -Cardiac monitoring w/ telemetry for now, further evaluation pending findings of noted workup                        HEMATOLOGY:   -H/H 9.7/33.5, Platelets 299, close monitoring of anemia   -Patient should have all age and risk appropriate malignancy screenings with PCP or sooner if clinically suspected      DVT ppx: no neurological contraindication to pharmacological dvt ppx     PULMONARY:   -Protecting airway, saturating well   -CXR: rule out infectious process , also s/p fall   -Supplemental O2 if indicated to maintain spO2 > 94%    RENAL:   -BUN/Cr 11.3/0.65, monitor urine output, maintain adequate hydration       Na Goal:  135-145     Daly: N/A    ID:   -Lactate 5.6, 2/2 underlying etiology? Continue to trend, UA, Blood cx x 2, CXR, further workup pending findings   -Afebrile, no leukocytosis, monitor for si/sx of infection     DISPOSITION: Rehab or home depending on PT eval once stable and workup is complete    CORE MEASURES:        Admission NIHSS: 7     Tenecteplase : [] YES [x] NO      LDL/HDL/A1C: PENDING     Depression Screen- if depression hx and/or present      Statin Therapy: as noted      Dysphagia Screen: [] PASS [x] FAIL     Smoking [] YES [] NO [x] SERA      Afib [] YES [x] NO     Stroke Education [x] YES [] NO    Obtain screening lower extremity venous ultrasound in patients who meet 1 or more of the following criteria as patient is high risk for DVT/PE on admission:   [] History of DVT/PE  []Hypercoagulable states (Factor V Leiden, Cancer, OCP, etc. )  []Prolonged immobility (hemiplegia/hemiparesis/post operative or any other extended immobilization)  [] Transferred from outside facility (Rehab or Long term care)  [] Age </= to 50 ASSESSMENT:   72 y/o F w/ PMH of bipolar dx vs schizo affective disorder, HTN, HLD, DMII who presented s/p witnessed fall with garbled speech and altered mental status on the morning of 1/11/24. Per EMS, the pt was making breakfast at 0850 that morning when she suddenly fell and possibly was aphasic. At neurological exam patient was post sedation but had what appeared to be a right hemiparesis and confusion. CT head demonstrated no acute infarction or hemorrhage, upon further discussion patient was noted by daughter via telephone call with ER to have had difficulty speaking and confusion over the last week therefor tenecteplase was deferred as outside of window.  CT angiogram head/neck showed upon review intracranial atherosclerotic disease, due to no large vessel occlusion therefor not thrombectomy candidate. Pt w/ neurological improvement today, right hemiparesis and confusion possibly left hemispheric dysfunction, rule out stroke, would consider alternate etiologies of encephalopathy as well including but not limited to seizure and infectious process.     NEURO:   -Neurologically, Continue close monitoring for neurologic deterioration    -Stroke neuro checks q 4h  -Permissive HTN, SBP range ~120-130mmHg, goal 130-160mmhg for now given atherosclerotic disease, avoid rapid fluctuations and hypotension   -Obtain lipid panel and A1C   -Titrate statin to LDL goal less than 70  -Pending MRI Brain w/o  -12 hr vEEG report as noted, may d/c   -ANTITHROMBOTIC THERAPY: ASA 81mg daily, add Clopidogrel 75mg daily for anticipated 90 day course if not contraindicated.    -Dysphagia screen: PASSED (01.11.24 @ 1840), initially FAILED due to lethargy - pt tolerating DASH diet  -Physical therapy/OT/Speech eval/treatment.     CARDIOVASCULAR:   -TTE as noted  -Pending orthostatics per primary team's recs  -Cardiac monitoring w/ telemetry for now, further evaluation pending findings of noted workup                        HEMATOLOGY:   -H/H 10.1/34.2, Platelets 255, MCV 71.4, Ferritin 9, B12 1655   -Likely iron deficiency anemia, continue close monitoring   -Patient should have all age and risk appropriate malignancy screenings with PCP or sooner if clinically suspected      DVT ppx: LMWH [x]    PULMONARY:   -Protecting airway, saturating well   -Obtain CXR: rule out infectious process , also s/p fall     RENAL:   -BUN/Cr 10.4/0.46, monitor urine output, maintain adequate hydration    -Potassium 3.2, all other lytes WNL, replenish PRN as indicated      Na Goal:  135-145     Daly: N/A    ID:   -Lactate 5.6--> 2.0 w/o intervention, reassess as indicated    -Afebrile, no leukocytosis, monitor for si/sx of infection     DISPOSITION: Rehab or home depending on PT eval once stable and workup is complete    CORE MEASURES:        Admission NIHSS: 7     Tenecteplase : [] YES [x] NO      LDL/HDL/A1C: 35/91/6.2     Depression Screen- if depression hx and/or present      Statin Therapy: as noted      Dysphagia Screen: [x] PASS [] FAIL     Smoking [] YES [x] NO     Afib [] YES [x] NO     Stroke Education [x] YES [] NO    Obtain screening lower extremity venous ultrasound in patients who meet 1 or more of the following criteria as patient is high risk for DVT/PE on admission:   [] History of DVT/PE  []Hypercoagulable states (Factor V Leiden, Cancer, OCP, etc. )  []Prolonged immobility (hemiplegia/hemiparesis/post operative or any other extended immobilization)  [] Transferred from outside facility (Rehab or Long term care)  [] Age </= to 50 ASSESSMENT:   72 y/o F w/ PMH of bipolar dx vs schizo affective disorder, HTN, HLD, DMII who presented s/p witnessed fall with garbled speech and altered mental status on the morning of 1/11/24. Per EMS, the pt was making breakfast at 0850 that morning when she suddenly fell and possibly was aphasic. At neurological exam patient was post sedation but had what appeared to be a right hemiparesis and confusion. CT head demonstrated no acute infarction or hemorrhage, upon further discussion patient was noted by daughter via telephone call with ER to have had difficulty speaking and confusion over the last week therefor tenecteplase was deferred as outside of window.  CT angiogram head/neck showed upon review intracranial atherosclerotic disease, due to no large vessel occlusion therefor not thrombectomy candidate. Pt w/ neurological improvement today, right hemiparesis and confusion possibly left hemispheric dysfunction, rule out stroke, would consider alternate etiologies of encephalopathy as well including but not limited to seizure and infectious process. Consider cardiac w/u for syncope as well.    NEURO:   -Neurologically improving, Continue close monitoring for neurologic deterioration    -Stroke neuro checks q 4h  -Permissive HTN, SBP range ~120-130mmHg, for now given neurologically tolerating. Underlying atherosclerotic disease, would avoid rapid fluctuations and hypotension   -Continue home statin regimen as LDL < 70  -Pending MRI Brain w/o  -12 hr vEEG report as noted, no seizures recorded, may d/c   -ANTITHROMBOTIC THERAPY: ASA 81mg daily, add Clopidogrel 75mg daily for anticipated 90 day course if not contraindicated.    -Dysphagia screen: PASSED (01.11.24 @ 1840), initially FAILED due to lethargy - pt tolerating DASH diet  -Physical therapy/OT/Speech eval/treatment.     CARDIOVASCULAR:   -TTE as noted  -Pending orthostatics per primary team's recs  -Cardiac monitoring w/ telemetry for now, further evaluation pending findings of noted workup                        HEMATOLOGY:   -H/H 10.1/34.2, Platelets 255, MCV 71.4, Ferritin 9, B12 1655   -Likely iron deficiency anemia, continue close monitoring   -Patient should have all age and risk appropriate malignancy screenings with PCP or sooner if clinically suspected, hematology eval for anemia, venefir in progress     DVT ppx: LMWH [x]    PULMONARY:   -Protecting airway, saturating well   -Obtain CXR: rule out infectious process , also s/p fall     RENAL:   -BUN/Cr 10.4/0.46, monitor urine output, maintain adequate hydration    -Potassium 3.2, all other lytes WNL, replenish PRN as indicated      Na Goal:  135-145     Daly: N/A    ID:   -Lactate 5.6--> 2.0 w/o intervention, reassess as indicated    -Afebrile, no leukocytosis, monitor for si/sx of infection     DISPOSITION: Rehab or home depending on PT eval once stable and workup is complete    CORE MEASURES:        Admission NIHSS: 7     Tenecteplase : [] YES [x] NO      LDL/HDL/A1C: 35/91/6.2     Depression Screen- if depression hx and/or present      Statin Therapy: as noted      Dysphagia Screen: [x] PASS [] FAIL     Smoking [] YES [x] NO     Afib [] YES [x] NO     Stroke Education [x] YES [] NO    Obtain screening lower extremity venous ultrasound in patients who meet 1 or more of the following criteria as patient is high risk for DVT/PE on admission:   [] History of DVT/PE  []Hypercoagulable states (Factor V Leiden, Cancer, OCP, etc. )  []Prolonged immobility (hemiplegia/hemiparesis/post operative or any other extended immobilization)  [] Transferred from outside facility (Rehab or Long term care)  [] Age </= to 50 ASSESSMENT:   74 y/o F w/ PMH of bipolar dx vs schizo affective disorder, HTN, HLD, DMII who presented s/p witnessed fall with garbled speech and altered mental status on the morning of 1/11/24. Per EMS, the pt was making breakfast at 0850 that morning when she suddenly fell and possibly was aphasic. At neurological exam patient was post sedation but had what appeared to be a right hemiparesis and confusion. CT head demonstrated no acute infarction or hemorrhage, upon further discussion patient was noted by daughter via telephone call with ER to have had difficulty speaking and confusion over the last week therefor tenecteplase was deferred as outside of window.  CT angiogram head/neck showed upon review intracranial atherosclerotic disease, due to no large vessel occlusion therefor not thrombectomy candidate. Pt w/ neurological improvement today, right hemiparesis and confusion possibly left hemispheric dysfunction, rule out stroke, would consider alternate etiologies of encephalopathy as well including but not limited to seizure and infectious process. Consider cardiac w/u for syncope as well.    NEURO:   -Neurologically improving, with improvement in facial palsy today. Continue close monitoring for neurologic deterioration    -Stroke neuro checks q 4h  -Permissive HTN, SBP range ~120-130mmHg, for now given neurologically tolerating. Underlying atherosclerotic disease, would avoid rapid fluctuations and hypotension   -Continue home statin regimen as LDL < 70  -Pending MRI Brain w/o  -12 hr vEEG report as noted, no seizures recorded, may d/c   -ANTITHROMBOTIC THERAPY: ASA 81mg daily, add Clopidogrel 75mg daily for anticipated 90 day course if not contraindicated, further re-eval pending MRI  -Dysphagia screen: PASSED (01.11.24 @ 1840), initially FAILED due to lethargy - pt tolerating DASH diet  -Physical therapy/OT/Speech eval/treatment.     CARDIOVASCULAR:   -TTE as noted  -Pending orthostatics per primary team's recs  -Cardiac monitoring w/ telemetry for now, further evaluation pending findings of noted workup                        HEMATOLOGY:   -H/H 10.1/34.2, Platelets 255, MCV 71.4, Ferritin 9, B12 1655   -Likely iron deficiency anemia, continue close monitoring   -Patient should have all age and risk appropriate malignancy screenings with PCP or sooner if clinically suspected, hematology eval for anemia, venefir in progress     DVT ppx: LMWH [x]    PULMONARY:   -Protecting airway, saturating well   -Obtain CXR: rule out infectious process , also s/p fall     RENAL:   -BUN/Cr 10.4/0.46, monitor urine output, maintain adequate hydration    -Potassium 3.2, all other lytes WNL, replenish PRN as indicated      Na Goal:  135-145     Daly: N/A    ID:   -Lactate 5.6--> 2.0 w/o intervention, reassess as indicated    -Afebrile, no leukocytosis, monitor for si/sx of infection     DISPOSITION: Rehab or home depending on PT eval once stable and workup is complete    CORE MEASURES:        Admission NIHSS: 7     Tenecteplase : [] YES [x] NO      LDL/HDL/A1C: 35/91/6.2     Depression Screen- if depression hx and/or present      Statin Therapy: as noted      Dysphagia Screen: [x] PASS [] FAIL     Smoking [] YES [x] NO     Afib [] YES [x] NO     Stroke Education [x] YES [] NO    Obtain screening lower extremity venous ultrasound in patients who meet 1 or more of the following criteria as patient is high risk for DVT/PE on admission:   [] History of DVT/PE  []Hypercoagulable states (Factor V Leiden, Cancer, OCP, etc. )  []Prolonged immobility (hemiplegia/hemiparesis/post operative or any other extended immobilization)  [] Transferred from outside facility (Rehab or Long term care)  [] Age </= to 50 ASSESSMENT:   72 y/o F w/ PMH of bipolar dx vs schizo affective disorder, HTN, HLD, DMII who presented s/p witnessed fall with garbled speech and altered mental status on the morning of 1/11/24. Per EMS, the pt was making breakfast at 0850 that morning when she suddenly fell and possibly was aphasic. At neurological exam patient was post sedation but had what appeared to be a right hemiparesis and confusion. CT head demonstrated no acute infarction or hemorrhage, upon further discussion patient was noted by daughter via telephone call with ER to have had difficulty speaking and confusion over the last week therefor tenecteplase was deferred as outside of window.  CT angiogram head/neck showed upon review intracranial atherosclerotic disease, due to no large vessel occlusion therefor not thrombectomy candidate. Pt w/ neurological improvement today, right hemiparesis and confusion possibly left hemispheric dysfunction, rule out stroke, would consider alternate etiologies of encephalopathy as well including but not limited to seizure and infectious process. Consider cardiac w/u for syncope as well.    NEURO:   -Neurologically improving, with improvement in facial palsy today. Continue close monitoring for neurologic deterioration    -Stroke neuro checks q 4h  -Permissive HTN, SBP range ~120-130mmHg, for now given neurologically tolerating. Underlying atherosclerotic disease, would avoid rapid fluctuations and hypotension   -Continue home statin regimen as LDL < 70  -Pending MRI Brain w/o  -12 hr vEEG report as noted, no seizures recorded, may d/c   -ANTITHROMBOTIC THERAPY: ASA 81mg daily, add Clopidogrel 75mg daily for anticipated 90 day course if not contraindicated, further re-eval pending MRI  -Dysphagia screen: PASSED (01.11.24 @ 1840), initially FAILED due to lethargy - pt tolerating DASH diet  -Physical therapy/OT/Speech eval/treatment.     CARDIOVASCULAR:   -TTE as noted  -Pending orthostatics per primary team's recs  -Cardiac monitoring w/ telemetry for now, further evaluation pending findings of noted workup                        HEMATOLOGY:   -H/H 10.1/34.2, Platelets 255, MCV 71.4, Ferritin 9, B12 1655   -Likely iron deficiency anemia, continue close monitoring   -Patient should have all age and risk appropriate malignancy screenings with PCP or sooner if clinically suspected, hematology eval for anemia, venefir in progress     DVT ppx: LMWH [x]    PULMONARY:   -Protecting airway, saturating well   -Obtain CXR: rule out infectious process , also s/p fall     RENAL:   -BUN/Cr 10.4/0.46, monitor urine output, maintain adequate hydration    -Potassium 3.2, all other lytes WNL, replenish PRN as indicated      Na Goal:  135-145     Daly: N/A    ID:   -Lactate 5.6--> 2.0 w/o intervention, reassess as indicated    -Afebrile, no leukocytosis, monitor for si/sx of infection     DISPOSITION: Rehab or home depending on PT eval once stable and workup is complete    CORE MEASURES:        Admission NIHSS: 7     Tenecteplase : [] YES [x] NO      LDL/HDL/A1C: 35/91/6.2     Depression Screen- if depression hx and/or present      Statin Therapy: as noted      Dysphagia Screen: [x] PASS [] FAIL     Smoking [] YES [x] NO     Afib [] YES [x] NO     Stroke Education [x] YES [] NO    Obtain screening lower extremity venous ultrasound in patients who meet 1 or more of the following criteria as patient is high risk for DVT/PE on admission:   [] History of DVT/PE  []Hypercoagulable states (Factor V Leiden, Cancer, OCP, etc. )  []Prolonged immobility (hemiplegia/hemiparesis/post operative or any other extended immobilization)  [] Transferred from outside facility (Rehab or Long term care)  [] Age </= to 50

## 2024-01-13 LAB
ALBUMIN SERPL ELPH-MCNC: 3.9 G/DL — SIGNIFICANT CHANGE UP (ref 3.3–5.2)
ALBUMIN SERPL ELPH-MCNC: 3.9 G/DL — SIGNIFICANT CHANGE UP (ref 3.3–5.2)
ALP SERPL-CCNC: 86 U/L — SIGNIFICANT CHANGE UP (ref 40–120)
ALP SERPL-CCNC: 86 U/L — SIGNIFICANT CHANGE UP (ref 40–120)
ALT FLD-CCNC: 24 U/L — SIGNIFICANT CHANGE UP
ALT FLD-CCNC: 24 U/L — SIGNIFICANT CHANGE UP
ANION GAP SERPL CALC-SCNC: 19 MMOL/L — HIGH (ref 5–17)
ANION GAP SERPL CALC-SCNC: 19 MMOL/L — HIGH (ref 5–17)
AST SERPL-CCNC: 34 U/L — HIGH
AST SERPL-CCNC: 34 U/L — HIGH
BASOPHILS # BLD AUTO: 0.03 K/UL — SIGNIFICANT CHANGE UP (ref 0–0.2)
BASOPHILS # BLD AUTO: 0.03 K/UL — SIGNIFICANT CHANGE UP (ref 0–0.2)
BASOPHILS NFR BLD AUTO: 0.5 % — SIGNIFICANT CHANGE UP (ref 0–2)
BASOPHILS NFR BLD AUTO: 0.5 % — SIGNIFICANT CHANGE UP (ref 0–2)
BILIRUB SERPL-MCNC: 0.4 MG/DL — SIGNIFICANT CHANGE UP (ref 0.4–2)
BILIRUB SERPL-MCNC: 0.4 MG/DL — SIGNIFICANT CHANGE UP (ref 0.4–2)
BUN SERPL-MCNC: 15.5 MG/DL — SIGNIFICANT CHANGE UP (ref 8–20)
BUN SERPL-MCNC: 15.5 MG/DL — SIGNIFICANT CHANGE UP (ref 8–20)
CALCIUM SERPL-MCNC: 9.6 MG/DL — SIGNIFICANT CHANGE UP (ref 8.4–10.5)
CALCIUM SERPL-MCNC: 9.6 MG/DL — SIGNIFICANT CHANGE UP (ref 8.4–10.5)
CHLORIDE SERPL-SCNC: 104 MMOL/L — SIGNIFICANT CHANGE UP (ref 96–108)
CHLORIDE SERPL-SCNC: 104 MMOL/L — SIGNIFICANT CHANGE UP (ref 96–108)
CO2 SERPL-SCNC: 18 MMOL/L — LOW (ref 22–29)
CO2 SERPL-SCNC: 18 MMOL/L — LOW (ref 22–29)
CREAT SERPL-MCNC: 0.5 MG/DL — SIGNIFICANT CHANGE UP (ref 0.5–1.3)
CREAT SERPL-MCNC: 0.5 MG/DL — SIGNIFICANT CHANGE UP (ref 0.5–1.3)
EGFR: 98 ML/MIN/1.73M2 — SIGNIFICANT CHANGE UP
EGFR: 98 ML/MIN/1.73M2 — SIGNIFICANT CHANGE UP
EOSINOPHIL # BLD AUTO: 0.01 K/UL — SIGNIFICANT CHANGE UP (ref 0–0.5)
EOSINOPHIL # BLD AUTO: 0.01 K/UL — SIGNIFICANT CHANGE UP (ref 0–0.5)
EOSINOPHIL NFR BLD AUTO: 0.2 % — SIGNIFICANT CHANGE UP (ref 0–6)
EOSINOPHIL NFR BLD AUTO: 0.2 % — SIGNIFICANT CHANGE UP (ref 0–6)
GLUCOSE BLDC GLUCOMTR-MCNC: 127 MG/DL — HIGH (ref 70–99)
GLUCOSE BLDC GLUCOMTR-MCNC: 127 MG/DL — HIGH (ref 70–99)
GLUCOSE BLDC GLUCOMTR-MCNC: 136 MG/DL — HIGH (ref 70–99)
GLUCOSE BLDC GLUCOMTR-MCNC: 136 MG/DL — HIGH (ref 70–99)
GLUCOSE BLDC GLUCOMTR-MCNC: 141 MG/DL — HIGH (ref 70–99)
GLUCOSE BLDC GLUCOMTR-MCNC: 141 MG/DL — HIGH (ref 70–99)
GLUCOSE BLDC GLUCOMTR-MCNC: 188 MG/DL — HIGH (ref 70–99)
GLUCOSE BLDC GLUCOMTR-MCNC: 188 MG/DL — HIGH (ref 70–99)
GLUCOSE SERPL-MCNC: 107 MG/DL — HIGH (ref 70–99)
GLUCOSE SERPL-MCNC: 107 MG/DL — HIGH (ref 70–99)
HCT VFR BLD CALC: 37.4 % — SIGNIFICANT CHANGE UP (ref 34.5–45)
HCT VFR BLD CALC: 37.4 % — SIGNIFICANT CHANGE UP (ref 34.5–45)
HGB BLD-MCNC: 10.5 G/DL — LOW (ref 11.5–15.5)
HGB BLD-MCNC: 10.5 G/DL — LOW (ref 11.5–15.5)
IMM GRANULOCYTES NFR BLD AUTO: 0.6 % — SIGNIFICANT CHANGE UP (ref 0–0.9)
IMM GRANULOCYTES NFR BLD AUTO: 0.6 % — SIGNIFICANT CHANGE UP (ref 0–0.9)
LYMPHOCYTES # BLD AUTO: 1.63 K/UL — SIGNIFICANT CHANGE UP (ref 1–3.3)
LYMPHOCYTES # BLD AUTO: 1.63 K/UL — SIGNIFICANT CHANGE UP (ref 1–3.3)
LYMPHOCYTES # BLD AUTO: 24.7 % — SIGNIFICANT CHANGE UP (ref 13–44)
LYMPHOCYTES # BLD AUTO: 24.7 % — SIGNIFICANT CHANGE UP (ref 13–44)
MAGNESIUM SERPL-MCNC: 1.8 MG/DL — SIGNIFICANT CHANGE UP (ref 1.6–2.6)
MAGNESIUM SERPL-MCNC: 1.8 MG/DL — SIGNIFICANT CHANGE UP (ref 1.6–2.6)
MCHC RBC-ENTMCNC: 21.1 PG — LOW (ref 27–34)
MCHC RBC-ENTMCNC: 21.1 PG — LOW (ref 27–34)
MCHC RBC-ENTMCNC: 28.1 GM/DL — LOW (ref 32–36)
MCHC RBC-ENTMCNC: 28.1 GM/DL — LOW (ref 32–36)
MCV RBC AUTO: 75.3 FL — LOW (ref 80–100)
MCV RBC AUTO: 75.3 FL — LOW (ref 80–100)
MONOCYTES # BLD AUTO: 0.64 K/UL — SIGNIFICANT CHANGE UP (ref 0–0.9)
MONOCYTES # BLD AUTO: 0.64 K/UL — SIGNIFICANT CHANGE UP (ref 0–0.9)
MONOCYTES NFR BLD AUTO: 9.7 % — SIGNIFICANT CHANGE UP (ref 2–14)
MONOCYTES NFR BLD AUTO: 9.7 % — SIGNIFICANT CHANGE UP (ref 2–14)
NEUTROPHILS # BLD AUTO: 4.26 K/UL — SIGNIFICANT CHANGE UP (ref 1.8–7.4)
NEUTROPHILS # BLD AUTO: 4.26 K/UL — SIGNIFICANT CHANGE UP (ref 1.8–7.4)
NEUTROPHILS NFR BLD AUTO: 64.3 % — SIGNIFICANT CHANGE UP (ref 43–77)
NEUTROPHILS NFR BLD AUTO: 64.3 % — SIGNIFICANT CHANGE UP (ref 43–77)
PLATELET # BLD AUTO: 251 K/UL — SIGNIFICANT CHANGE UP (ref 150–400)
PLATELET # BLD AUTO: 251 K/UL — SIGNIFICANT CHANGE UP (ref 150–400)
POTASSIUM SERPL-MCNC: 4.1 MMOL/L — SIGNIFICANT CHANGE UP (ref 3.5–5.3)
POTASSIUM SERPL-MCNC: 4.1 MMOL/L — SIGNIFICANT CHANGE UP (ref 3.5–5.3)
POTASSIUM SERPL-SCNC: 4.1 MMOL/L — SIGNIFICANT CHANGE UP (ref 3.5–5.3)
POTASSIUM SERPL-SCNC: 4.1 MMOL/L — SIGNIFICANT CHANGE UP (ref 3.5–5.3)
PROT SERPL-MCNC: 6.6 G/DL — SIGNIFICANT CHANGE UP (ref 6.6–8.7)
PROT SERPL-MCNC: 6.6 G/DL — SIGNIFICANT CHANGE UP (ref 6.6–8.7)
RBC # BLD: 4.97 M/UL — SIGNIFICANT CHANGE UP (ref 3.8–5.2)
RBC # BLD: 4.97 M/UL — SIGNIFICANT CHANGE UP (ref 3.8–5.2)
RBC # FLD: 20.2 % — HIGH (ref 10.3–14.5)
RBC # FLD: 20.2 % — HIGH (ref 10.3–14.5)
SODIUM SERPL-SCNC: 141 MMOL/L — SIGNIFICANT CHANGE UP (ref 135–145)
SODIUM SERPL-SCNC: 141 MMOL/L — SIGNIFICANT CHANGE UP (ref 135–145)
WBC # BLD: 6.61 K/UL — SIGNIFICANT CHANGE UP (ref 3.8–10.5)
WBC # BLD: 6.61 K/UL — SIGNIFICANT CHANGE UP (ref 3.8–10.5)
WBC # FLD AUTO: 6.61 K/UL — SIGNIFICANT CHANGE UP (ref 3.8–10.5)
WBC # FLD AUTO: 6.61 K/UL — SIGNIFICANT CHANGE UP (ref 3.8–10.5)

## 2024-01-13 PROCEDURE — 99233 SBSQ HOSP IP/OBS HIGH 50: CPT

## 2024-01-13 RX ORDER — LEVOTHYROXINE SODIUM 125 MCG
25 TABLET ORAL DAILY
Refills: 0 | Status: DISCONTINUED | OUTPATIENT
Start: 2024-01-13 | End: 2024-01-16

## 2024-01-13 RX ADMIN — Medication 81 MILLIGRAM(S): at 12:01

## 2024-01-13 RX ADMIN — ARIPIPRAZOLE 30 MILLIGRAM(S): 15 TABLET ORAL at 12:02

## 2024-01-13 RX ADMIN — CLOPIDOGREL BISULFATE 75 MILLIGRAM(S): 75 TABLET, FILM COATED ORAL at 12:01

## 2024-01-13 RX ADMIN — PERPHENAZINE 8 MILLIGRAM(S): 8 TABLET, FILM COATED ORAL at 22:52

## 2024-01-13 RX ADMIN — IRON SUCROSE 110 MILLIGRAM(S): 20 INJECTION, SOLUTION INTRAVENOUS at 12:07

## 2024-01-13 RX ADMIN — AMLODIPINE BESYLATE 5 MILLIGRAM(S): 2.5 TABLET ORAL at 05:36

## 2024-01-13 RX ADMIN — ATORVASTATIN CALCIUM 80 MILLIGRAM(S): 80 TABLET, FILM COATED ORAL at 22:47

## 2024-01-13 RX ADMIN — Medication 1: at 13:12

## 2024-01-13 RX ADMIN — ENOXAPARIN SODIUM 40 MILLIGRAM(S): 100 INJECTION SUBCUTANEOUS at 22:47

## 2024-01-13 NOTE — PROGRESS NOTE ADULT - SUBJECTIVE AND OBJECTIVE BOX
[facility] Division of Hospital Medicine    Chief Complaint:  seizure vs cva    INTERVAL HISTORY:  Overnight, no acute events.     Patient seen and examined at bedside this morning. Reports feeling well. Offers no new complaints. A&Ox3. Patient denies chest pain, SOB, abd pain, N/V, fever, chills, dysuria or any other complaints. Reports she has been vaping a lot and wondering if fall related to vape use. Reports using 1 disposable vape every 3 days. Not THC vape. Reports vaping does cause some weakness/lightheadedness.     MEDICATIONS  (STANDING):  amLODIPine   Tablet 5 milliGRAM(s) Oral daily  ARIPiprazole 30 milliGRAM(s) Oral daily  aspirin enteric coated 81 milliGRAM(s) Oral daily  atorvastatin 80 milliGRAM(s) Oral at bedtime  clopidogrel Tablet 75 milliGRAM(s) Oral daily  dextrose 5%. 1000 milliLiter(s) (50 mL/Hr) IV Continuous <Continuous>  dextrose 5%. 1000 milliLiter(s) (100 mL/Hr) IV Continuous <Continuous>  dextrose 50% Injectable 25 Gram(s) IV Push once  dextrose 50% Injectable 12.5 Gram(s) IV Push once  dextrose 50% Injectable 25 Gram(s) IV Push once  enoxaparin Injectable 40 milliGRAM(s) SubCutaneous every 24 hours  glucagon  Injectable 1 milliGRAM(s) IntraMuscular once  insulin lispro (ADMELOG) corrective regimen sliding scale   SubCutaneous three times a day before meals  iron sucrose IVPB 200 milliGRAM(s) IV Intermittent every 24 hours  magnesium sulfate  IVPB 2 Gram(s) IV Intermittent once  perphenazine 8 milliGRAM(s) Oral at bedtime  sodium chloride 0.9%. 1000 milliLiter(s) (50 mL/Hr) IV Continuous <Continuous>    MEDICATIONS  (PRN):  acetaminophen     Tablet .. 650 milliGRAM(s) Oral every 6 hours PRN Temp greater or equal to 38C (100.4F), Mild Pain (1 - 3)  dextrose Oral Gel 15 Gram(s) Oral once PRN Blood Glucose LESS THAN 70 milliGRAM(s)/deciliter        I&O's Summary    13 Jan 2024 07:01  -  13 Jan 2024 15:36  --------------------------------------------------------  IN: 750 mL / OUT: 550 mL / NET: 200 mL        PHYSICAL EXAM:  Vital Signs Last 24 Hrs  T(C): 36.9 (13 Jan 2024 12:00), Max: 37.2 (13 Jan 2024 04:33)  T(F): 98.5 (13 Jan 2024 12:00), Max: 98.9 (13 Jan 2024 04:33)  HR: 88 (13 Jan 2024 12:00) (79 - 90)  BP: 133/78 (13 Jan 2024 12:00) (116/72 - 151/71)  BP(mean): --  RR: 18 (13 Jan 2024 12:00) (18 - 18)  SpO2: 94% (13 Jan 2024 12:00) (94% - 98%)    Parameters below as of 13 Jan 2024 12:00  Patient On (Oxygen Delivery Method): room air          CONSTITUTIONAL: No apparent distress  HEENT: Normocephalic, Atraumatic.   RESPIRATORY:  lungs are clear to auscultation bilaterally, No crackles, rhonchi, wheezes  CARDIOVASCULAR: Regular rate and rhythm, No lower extremity edema  ABDOMEN: Soft, non-distended, nontender to palpation, +BS  MUSCLOSKELETAL: moving all 4 extremities spontaneously  PSYCH: thoughts linear, affect appropriate  NEUROLOGY: Alert, Oriented x3, follows commands     LABS:                        10.5   6.61  )-----------( 251      ( 13 Jan 2024 06:56 )             37.4     01-13    141  |  104  |  15.5  ----------------------------<  107<H>  4.1   |  18.0<L>  |  0.50    Ca    9.6      13 Jan 2024 06:56  Phos  3.3     01-12  Mg     1.8     01-13    TPro  6.6  /  Alb  3.9  /  TBili  0.4  /  DBili  x   /  AST  34<H>  /  ALT  24  /  AlkPhos  86  01-13          Urinalysis Basic - ( 13 Jan 2024 06:56 )    Color: x / Appearance: x / SG: x / pH: x  Gluc: 107 mg/dL / Ketone: x  / Bili: x / Urobili: x   Blood: x / Protein: x / Nitrite: x   Leuk Esterase: x / RBC: x / WBC x   Sq Epi: x / Non Sq Epi: x / Bacteria: x        CAPILLARY BLOOD GLUCOSE      POCT Blood Glucose.: 188 mg/dL (13 Jan 2024 12:20)  POCT Blood Glucose.: 127 mg/dL (13 Jan 2024 08:11)  POCT Blood Glucose.: 157 mg/dL (12 Jan 2024 20:55)  POCT Blood Glucose.: 192 mg/dL (12 Jan 2024 16:36)        RADIOLOGY & ADDITIONAL TESTS:  Results Reviewed                                         [facility] Division of Hospital Medicine    Chief Complaint:  seizure vs cva    INTERVAL HISTORY:  Overnight, no acute events.     Patient seen and examined at bedside this morning. Reports feeling well. Offers no new complaints. A&Ox3. Patient denies chest pain, SOB, abd pain, N/V, fever, chills, dysuria or any other complaints. Reports she has been vaping a lot and wondering if fall related to vape use. Reports using 1 disposable vape every 3 days. Not THC vape. Reports vaping does cause some weakness/lightheadedness.     MEDICATIONS  (STANDING):  amLODIPine   Tablet 5 milliGRAM(s) Oral daily  ARIPiprazole 30 milliGRAM(s) Oral daily  aspirin enteric coated 81 milliGRAM(s) Oral daily  atorvastatin 80 milliGRAM(s) Oral at bedtime  clopidogrel Tablet 75 milliGRAM(s) Oral daily  dextrose 5%. 1000 milliLiter(s) (50 mL/Hr) IV Continuous <Continuous>  dextrose 5%. 1000 milliLiter(s) (100 mL/Hr) IV Continuous <Continuous>  dextrose 50% Injectable 25 Gram(s) IV Push once  dextrose 50% Injectable 12.5 Gram(s) IV Push once  dextrose 50% Injectable 25 Gram(s) IV Push once  enoxaparin Injectable 40 milliGRAM(s) SubCutaneous every 24 hours  glucagon  Injectable 1 milliGRAM(s) IntraMuscular once  insulin lispro (ADMELOG) corrective regimen sliding scale   SubCutaneous three times a day before meals  iron sucrose IVPB 200 milliGRAM(s) IV Intermittent every 24 hours  magnesium sulfate  IVPB 2 Gram(s) IV Intermittent once  perphenazine 8 milliGRAM(s) Oral at bedtime  sodium chloride 0.9%. 1000 milliLiter(s) (50 mL/Hr) IV Continuous <Continuous>    MEDICATIONS  (PRN):  acetaminophen     Tablet .. 650 milliGRAM(s) Oral every 6 hours PRN Temp greater or equal to 38C (100.4F), Mild Pain (1 - 3)  dextrose Oral Gel 15 Gram(s) Oral once PRN Blood Glucose LESS THAN 70 milliGRAM(s)/deciliter        I&O's Summary    13 Jan 2024 07:01  -  13 Jan 2024 15:36  --------------------------------------------------------  IN: 750 mL / OUT: 550 mL / NET: 200 mL        PHYSICAL EXAM:  Vital Signs Last 24 Hrs  T(C): 36.9 (13 Jan 2024 12:00), Max: 37.2 (13 Jan 2024 04:33)  T(F): 98.5 (13 Jan 2024 12:00), Max: 98.9 (13 Jan 2024 04:33)  HR: 88 (13 Jan 2024 12:00) (79 - 90)  BP: 133/78 (13 Jan 2024 12:00) (116/72 - 151/71)  BP(mean): --  RR: 18 (13 Jan 2024 12:00) (18 - 18)  SpO2: 94% (13 Jan 2024 12:00) (94% - 98%)    Parameters below as of 13 Jan 2024 12:00  Patient On (Oxygen Delivery Method): room air          CONSTITUTIONAL: No apparent distress  HEENT: Normocephalic, Atraumatic.   RESPIRATORY:  lungs are clear to auscultation bilaterally, No crackles, rhonchi, wheezes  CARDIOVASCULAR: Regular rate and rhythm, No lower extremity edema  ABDOMEN: Soft, non-distended, nontender to palpation, +BS  MUSCLOSKELETAL: moving all 4 extremities spontaneously  PSYCH: thoughts linear, affect appropriate  NEUROLOGY: Alert, Oriented x3, follows commands     LABS:                        10.5   6.61  )-----------( 251      ( 13 Jan 2024 06:56 )             37.4     01-13    141  |  104  |  15.5  ----------------------------<  107<H>  4.1   |  18.0<L>  |  0.50    Ca    9.6      13 Jan 2024 06:56  Phos  3.3     01-12  Mg     1.8     01-13    TPro  6.6  /  Alb  3.9  /  TBili  0.4  /  DBili  x   /  AST  34<H>  /  ALT  24  /  AlkPhos  86  01-13          Urinalysis Basic - ( 13 Jan 2024 06:56 )    Color: x / Appearance: x / SG: x / pH: x  Gluc: 107 mg/dL / Ketone: x  / Bili: x / Urobili: x   Blood: x / Protein: x / Nitrite: x   Leuk Esterase: x / RBC: x / WBC x   Sq Epi: x / Non Sq Epi: x / Bacteria: x        CAPILLARY BLOOD GLUCOSE      POCT Blood Glucose.: 188 mg/dL (13 Jan 2024 12:20)  POCT Blood Glucose.: 127 mg/dL (13 Jan 2024 08:11)  POCT Blood Glucose.: 157 mg/dL (12 Jan 2024 20:55)  POCT Blood Glucose.: 192 mg/dL (12 Jan 2024 16:36)        RADIOLOGY & ADDITIONAL TESTS:  Results Reviewed

## 2024-01-13 NOTE — INPATIENT CERTIFICATION FOR MEDICARE PATIENTS - THE STATUS OF COMORBIDITIES.
Detail Level: Zone Patient Specific Counseling (Will Not Stick From Patient To Patient): Not to goal therapy 2. The status of comorbities. (See ED/admit documents)

## 2024-01-13 NOTE — PROGRESS NOTE ADULT - ASSESSMENT
74 y/o F w/ PMH of bipolar dx vs schizo affective disorder, HTN, HLD, DMII presented after pt collapsed in the kitchen.  She was unresponsive on the floor for a few seconds and upon awakening was noted to be confused with garbled speech.  Pt is a poor historian.  As per ED records, pts daughter reports that she has been having episodes of garbled speech for the past week.  Code stroke called in ED.  CTH negative, CTA h/n and perfusion negative.  Pt admitted for r/o CVA vs seizure.     #Seizure vs syncope vs r/o CVA   - vEEG negative   - CTH, CTA h/n and perfusion study negative   - MRI brain pending   - c/w asa, plavix (DAPT x 60 days for ICAD as per neuro)  - c/w statin  - Neuro stroke following     #Microcytic anemia   - Baseline h/h unknown   - low iron, ferritin, %sat consistent with iron def anemia  - reports getting routine colonoscopy and egd outpt  - last outpt scopes cancelled given episode of emesis, pt to re-schedule on outpt basis  - per pt, no abnormal scopes  - c/w venofer    #hypothyroidism  - f/u am TSH, T4  - will start home synthroid 25mcg qam    #DM Ii  - ISS and hypoglycemic protocol in place     #HTN / HLD  - c/w home meds     #Bipolar vs Schizophrenia/affective disorder   - c/w home meds     VTE ppx: lovenox     Dispo: MRI pending, PT pending    72 y/o F w/ PMH of bipolar dx vs schizo affective disorder, HTN, HLD, DMII presented after pt collapsed in the kitchen.  She was unresponsive on the floor for a few seconds and upon awakening was noted to be confused with garbled speech.  Pt is a poor historian.  As per ED records, pts daughter reports that she has been having episodes of garbled speech for the past week.  Code stroke called in ED.  CTH negative, CTA h/n and perfusion negative.  Pt admitted for r/o CVA vs seizure.     #Seizure vs syncope vs r/o CVA   - vEEG negative   - CTH, CTA h/n and perfusion study negative   - MRI brain pending   - c/w asa, plavix (DAPT x 60 days for ICAD as per neuro)  - c/w statin  - Neuro stroke following     #Microcytic anemia   - Baseline h/h unknown   - low iron, ferritin, %sat consistent with iron def anemia  - reports getting routine colonoscopy and egd outpt  - last outpt scopes cancelled given episode of emesis, pt to re-schedule on outpt basis  - per pt, no abnormal scopes  - c/w venofer    #hypothyroidism  - f/u am TSH, T4  - will start home synthroid 25mcg qam    #DM Ii  - ISS and hypoglycemic protocol in place     #HTN / HLD  - c/w home meds     #Bipolar vs Schizophrenia/affective disorder   - c/w home meds     VTE ppx: lovenox     Dispo: MRI pending, PT pending

## 2024-01-14 LAB
ANION GAP SERPL CALC-SCNC: 16 MMOL/L — SIGNIFICANT CHANGE UP (ref 5–17)
ANION GAP SERPL CALC-SCNC: 16 MMOL/L — SIGNIFICANT CHANGE UP (ref 5–17)
BUN SERPL-MCNC: 15 MG/DL — SIGNIFICANT CHANGE UP (ref 8–20)
BUN SERPL-MCNC: 15 MG/DL — SIGNIFICANT CHANGE UP (ref 8–20)
CALCIUM SERPL-MCNC: 9.5 MG/DL — SIGNIFICANT CHANGE UP (ref 8.4–10.5)
CALCIUM SERPL-MCNC: 9.5 MG/DL — SIGNIFICANT CHANGE UP (ref 8.4–10.5)
CHLORIDE SERPL-SCNC: 103 MMOL/L — SIGNIFICANT CHANGE UP (ref 96–108)
CHLORIDE SERPL-SCNC: 103 MMOL/L — SIGNIFICANT CHANGE UP (ref 96–108)
CO2 SERPL-SCNC: 22 MMOL/L — SIGNIFICANT CHANGE UP (ref 22–29)
CO2 SERPL-SCNC: 22 MMOL/L — SIGNIFICANT CHANGE UP (ref 22–29)
CREAT SERPL-MCNC: 0.5 MG/DL — SIGNIFICANT CHANGE UP (ref 0.5–1.3)
CREAT SERPL-MCNC: 0.5 MG/DL — SIGNIFICANT CHANGE UP (ref 0.5–1.3)
EGFR: 98 ML/MIN/1.73M2 — SIGNIFICANT CHANGE UP
EGFR: 98 ML/MIN/1.73M2 — SIGNIFICANT CHANGE UP
GLUCOSE BLDC GLUCOMTR-MCNC: 125 MG/DL — HIGH (ref 70–99)
GLUCOSE BLDC GLUCOMTR-MCNC: 125 MG/DL — HIGH (ref 70–99)
GLUCOSE BLDC GLUCOMTR-MCNC: 126 MG/DL — HIGH (ref 70–99)
GLUCOSE BLDC GLUCOMTR-MCNC: 126 MG/DL — HIGH (ref 70–99)
GLUCOSE BLDC GLUCOMTR-MCNC: 127 MG/DL — HIGH (ref 70–99)
GLUCOSE BLDC GLUCOMTR-MCNC: 127 MG/DL — HIGH (ref 70–99)
GLUCOSE BLDC GLUCOMTR-MCNC: 142 MG/DL — HIGH (ref 70–99)
GLUCOSE BLDC GLUCOMTR-MCNC: 142 MG/DL — HIGH (ref 70–99)
GLUCOSE SERPL-MCNC: 118 MG/DL — HIGH (ref 70–99)
GLUCOSE SERPL-MCNC: 118 MG/DL — HIGH (ref 70–99)
HCT VFR BLD CALC: 33.5 % — LOW (ref 34.5–45)
HCT VFR BLD CALC: 33.5 % — LOW (ref 34.5–45)
HGB BLD-MCNC: 9.7 G/DL — LOW (ref 11.5–15.5)
HGB BLD-MCNC: 9.7 G/DL — LOW (ref 11.5–15.5)
MCHC RBC-ENTMCNC: 20.4 PG — LOW (ref 27–34)
MCHC RBC-ENTMCNC: 20.4 PG — LOW (ref 27–34)
MCHC RBC-ENTMCNC: 29 GM/DL — LOW (ref 32–36)
MCHC RBC-ENTMCNC: 29 GM/DL — LOW (ref 32–36)
MCV RBC AUTO: 70.4 FL — LOW (ref 80–100)
MCV RBC AUTO: 70.4 FL — LOW (ref 80–100)
PLATELET # BLD AUTO: 289 K/UL — SIGNIFICANT CHANGE UP (ref 150–400)
PLATELET # BLD AUTO: 289 K/UL — SIGNIFICANT CHANGE UP (ref 150–400)
POTASSIUM SERPL-MCNC: 3.7 MMOL/L — SIGNIFICANT CHANGE UP (ref 3.5–5.3)
POTASSIUM SERPL-MCNC: 3.7 MMOL/L — SIGNIFICANT CHANGE UP (ref 3.5–5.3)
POTASSIUM SERPL-SCNC: 3.7 MMOL/L — SIGNIFICANT CHANGE UP (ref 3.5–5.3)
POTASSIUM SERPL-SCNC: 3.7 MMOL/L — SIGNIFICANT CHANGE UP (ref 3.5–5.3)
RBC # BLD: 4.76 M/UL — SIGNIFICANT CHANGE UP (ref 3.8–5.2)
RBC # BLD: 4.76 M/UL — SIGNIFICANT CHANGE UP (ref 3.8–5.2)
RBC # FLD: 19.5 % — HIGH (ref 10.3–14.5)
RBC # FLD: 19.5 % — HIGH (ref 10.3–14.5)
SODIUM SERPL-SCNC: 141 MMOL/L — SIGNIFICANT CHANGE UP (ref 135–145)
SODIUM SERPL-SCNC: 141 MMOL/L — SIGNIFICANT CHANGE UP (ref 135–145)
T4 AB SER-ACNC: 7.5 UG/DL — SIGNIFICANT CHANGE UP (ref 4.5–12)
T4 AB SER-ACNC: 7.5 UG/DL — SIGNIFICANT CHANGE UP (ref 4.5–12)
TSH SERPL-MCNC: 3.26 UIU/ML — SIGNIFICANT CHANGE UP (ref 0.27–4.2)
TSH SERPL-MCNC: 3.26 UIU/ML — SIGNIFICANT CHANGE UP (ref 0.27–4.2)
WBC # BLD: 7.44 K/UL — SIGNIFICANT CHANGE UP (ref 3.8–10.5)
WBC # BLD: 7.44 K/UL — SIGNIFICANT CHANGE UP (ref 3.8–10.5)
WBC # FLD AUTO: 7.44 K/UL — SIGNIFICANT CHANGE UP (ref 3.8–10.5)
WBC # FLD AUTO: 7.44 K/UL — SIGNIFICANT CHANGE UP (ref 3.8–10.5)

## 2024-01-14 PROCEDURE — 99233 SBSQ HOSP IP/OBS HIGH 50: CPT

## 2024-01-14 RX ADMIN — ATORVASTATIN CALCIUM 80 MILLIGRAM(S): 80 TABLET, FILM COATED ORAL at 21:41

## 2024-01-14 RX ADMIN — PERPHENAZINE 8 MILLIGRAM(S): 8 TABLET, FILM COATED ORAL at 21:41

## 2024-01-14 RX ADMIN — AMLODIPINE BESYLATE 5 MILLIGRAM(S): 2.5 TABLET ORAL at 05:41

## 2024-01-14 RX ADMIN — IRON SUCROSE 110 MILLIGRAM(S): 20 INJECTION, SOLUTION INTRAVENOUS at 10:50

## 2024-01-14 RX ADMIN — ENOXAPARIN SODIUM 40 MILLIGRAM(S): 100 INJECTION SUBCUTANEOUS at 21:41

## 2024-01-14 RX ADMIN — Medication 81 MILLIGRAM(S): at 11:19

## 2024-01-14 RX ADMIN — ARIPIPRAZOLE 30 MILLIGRAM(S): 15 TABLET ORAL at 11:19

## 2024-01-14 RX ADMIN — CLOPIDOGREL BISULFATE 75 MILLIGRAM(S): 75 TABLET, FILM COATED ORAL at 11:19

## 2024-01-14 RX ADMIN — Medication 25 MICROGRAM(S): at 05:41

## 2024-01-14 NOTE — PROGRESS NOTE ADULT - SUBJECTIVE AND OBJECTIVE BOX
[facility] Division of Hospital Medicine    Chief Complaint: seizure vs cva    INTERVAL HISTORY:  Overnight, no acute events.     Patient seen and examined at bedside sitting in chair this morning. Reports feeling well. Eating breakfast. Offers no new complaints. A&Ox3. Patient denies chest pain, SOB, abd pain, N/V, fever, chills, dysuria or any other complaints. Discharged focused.     MEDICATIONS  (STANDING):  amLODIPine   Tablet 5 milliGRAM(s) Oral daily  ARIPiprazole 30 milliGRAM(s) Oral daily  aspirin enteric coated 81 milliGRAM(s) Oral daily  atorvastatin 80 milliGRAM(s) Oral at bedtime  clopidogrel Tablet 75 milliGRAM(s) Oral daily  dextrose 5%. 1000 milliLiter(s) (100 mL/Hr) IV Continuous <Continuous>  dextrose 5%. 1000 milliLiter(s) (50 mL/Hr) IV Continuous <Continuous>  dextrose 50% Injectable 25 Gram(s) IV Push once  dextrose 50% Injectable 12.5 Gram(s) IV Push once  dextrose 50% Injectable 25 Gram(s) IV Push once  enoxaparin Injectable 40 milliGRAM(s) SubCutaneous every 24 hours  glucagon  Injectable 1 milliGRAM(s) IntraMuscular once  insulin lispro (ADMELOG) corrective regimen sliding scale   SubCutaneous three times a day before meals  iron sucrose IVPB 200 milliGRAM(s) IV Intermittent every 24 hours  levothyroxine 25 MICROGram(s) Oral daily  magnesium sulfate  IVPB 2 Gram(s) IV Intermittent once  perphenazine 8 milliGRAM(s) Oral at bedtime  sodium chloride 0.9%. 1000 milliLiter(s) (50 mL/Hr) IV Continuous <Continuous>    MEDICATIONS  (PRN):  acetaminophen     Tablet .. 650 milliGRAM(s) Oral every 6 hours PRN Temp greater or equal to 38C (100.4F), Mild Pain (1 - 3)  dextrose Oral Gel 15 Gram(s) Oral once PRN Blood Glucose LESS THAN 70 milliGRAM(s)/deciliter        I&O's Summary    13 Jan 2024 07:01  -  14 Jan 2024 07:00  --------------------------------------------------------  IN: 1100 mL / OUT: 1145 mL / NET: -45 mL    14 Jan 2024 07:01  -  14 Jan 2024 15:51  --------------------------------------------------------  IN: 700 mL / OUT: 0 mL / NET: 700 mL        PHYSICAL EXAM:  Vital Signs Last 24 Hrs  T(C): 37 (14 Jan 2024 12:00), Max: 37 (14 Jan 2024 12:00)  T(F): 98.6 (14 Jan 2024 12:00), Max: 98.6 (14 Jan 2024 12:00)  HR: 78 (14 Jan 2024 12:00) (73 - 82)  BP: 128/82 (14 Jan 2024 12:00) (121/84 - 132/63)  BP(mean): --  RR: 18 (14 Jan 2024 12:00) (17 - 18)  SpO2: 95% (14 Jan 2024 12:00) (95% - 98%)    Parameters below as of 14 Jan 2024 12:00  Patient On (Oxygen Delivery Method): room air      CONSTITUTIONAL: No apparent distress  HEENT: Normocephalic, Atraumatic.   RESPIRATORY:  lungs are clear to auscultation bilaterally, No crackles, rhonchi, wheezes  CARDIOVASCULAR: Regular rate and rhythm, No lower extremity edema  ABDOMEN: Soft, non-distended, nontender to palpation, +BS  MUSCLOSKELETAL: moving all 4 extremities spontaneously  PSYCH: thoughts linear, affect appropriate  NEUROLOGY: Alert, Oriented x3, follows commands   LABS:                        9.7    7.44  )-----------( 289      ( 14 Jan 2024 06:40 )             33.5     01-14    141  |  103  |  15.0  ----------------------------<  118<H>  3.7   |  22.0  |  0.50    Ca    9.5      14 Jan 2024 06:40  Mg     1.8     01-13    TPro  6.6  /  Alb  3.9  /  TBili  0.4  /  DBili  x   /  AST  34<H>  /  ALT  24  /  AlkPhos  86  01-13          Urinalysis Basic - ( 14 Jan 2024 06:40 )    Color: x / Appearance: x / SG: x / pH: x  Gluc: 118 mg/dL / Ketone: x  / Bili: x / Urobili: x   Blood: x / Protein: x / Nitrite: x   Leuk Esterase: x / RBC: x / WBC x   Sq Epi: x / Non Sq Epi: x / Bacteria: x        CAPILLARY BLOOD GLUCOSE      POCT Blood Glucose.: 127 mg/dL (14 Jan 2024 12:18)  POCT Blood Glucose.: 142 mg/dL (14 Jan 2024 09:30)  POCT Blood Glucose.: 141 mg/dL (13 Jan 2024 21:13)  POCT Blood Glucose.: 136 mg/dL (13 Jan 2024 17:12)        RADIOLOGY & ADDITIONAL TESTS:  Results Reviewed:   Imaging Personally Reviewed:  Electrocardiogram Personally Reviewed:                                           [facility] Division of Hospital Medicine    Chief Complaint: seizure vs cva    INTERVAL HISTORY:  Overnight, no acute events.     Patient seen and examined at bedside sitting in chair this morning. Reports feeling well. Eating breakfast. Offers no new complaints. A&Ox3. Patient denies chest pain, SOB, abd pain, N/V, fever, chills, dysuria or any other complaints. Discharged focused.     MEDICATIONS  (STANDING):  amLODIPine   Tablet 5 milliGRAM(s) Oral daily  ARIPiprazole 30 milliGRAM(s) Oral daily  aspirin enteric coated 81 milliGRAM(s) Oral daily  atorvastatin 80 milliGRAM(s) Oral at bedtime  clopidogrel Tablet 75 milliGRAM(s) Oral daily  dextrose 5%. 1000 milliLiter(s) (100 mL/Hr) IV Continuous <Continuous>  dextrose 5%. 1000 milliLiter(s) (50 mL/Hr) IV Continuous <Continuous>  dextrose 50% Injectable 25 Gram(s) IV Push once  dextrose 50% Injectable 12.5 Gram(s) IV Push once  dextrose 50% Injectable 25 Gram(s) IV Push once  enoxaparin Injectable 40 milliGRAM(s) SubCutaneous every 24 hours  glucagon  Injectable 1 milliGRAM(s) IntraMuscular once  insulin lispro (ADMELOG) corrective regimen sliding scale   SubCutaneous three times a day before meals  iron sucrose IVPB 200 milliGRAM(s) IV Intermittent every 24 hours  levothyroxine 25 MICROGram(s) Oral daily  magnesium sulfate  IVPB 2 Gram(s) IV Intermittent once  perphenazine 8 milliGRAM(s) Oral at bedtime  sodium chloride 0.9%. 1000 milliLiter(s) (50 mL/Hr) IV Continuous <Continuous>    MEDICATIONS  (PRN):  acetaminophen     Tablet .. 650 milliGRAM(s) Oral every 6 hours PRN Temp greater or equal to 38C (100.4F), Mild Pain (1 - 3)  dextrose Oral Gel 15 Gram(s) Oral once PRN Blood Glucose LESS THAN 70 milliGRAM(s)/deciliter        I&O's Summary    13 Jan 2024 07:01  -  14 Jan 2024 07:00  --------------------------------------------------------  IN: 1100 mL / OUT: 1145 mL / NET: -45 mL    14 Jan 2024 07:01  -  14 Jan 2024 15:51  --------------------------------------------------------  IN: 700 mL / OUT: 0 mL / NET: 700 mL        PHYSICAL EXAM:  Vital Signs Last 24 Hrs  T(C): 37 (14 Jan 2024 12:00), Max: 37 (14 Jan 2024 12:00)  T(F): 98.6 (14 Jan 2024 12:00), Max: 98.6 (14 Jan 2024 12:00)  HR: 78 (14 Jan 2024 12:00) (73 - 82)  BP: 128/82 (14 Jan 2024 12:00) (121/84 - 132/63)  BP(mean): --  RR: 18 (14 Jan 2024 12:00) (17 - 18)  SpO2: 95% (14 Jan 2024 12:00) (95% - 98%)    Parameters below as of 14 Jan 2024 12:00  Patient On (Oxygen Delivery Method): room air      CONSTITUTIONAL: No apparent distress  HEENT: Normocephalic, Atraumatic.   RESPIRATORY:  lungs are clear to auscultation bilaterally, No crackles, rhonchi, wheezes  CARDIOVASCULAR: Regular rate and rhythm, No lower extremity edema  ABDOMEN: Soft, non-distended, nontender to palpation, +BS  MUSCLOSKELETAL: moving all 4 extremities spontaneously  PSYCH: thoughts linear, affect appropriate  NEUROLOGY: Alert, Oriented x3, follows commands   LABS:                        9.7    7.44  )-----------( 289      ( 14 Jan 2024 06:40 )             33.5     01-14    141  |  103  |  15.0  ----------------------------<  118<H>  3.7   |  22.0  |  0.50    Ca    9.5      14 Jan 2024 06:40  Mg     1.8     01-13    TPro  6.6  /  Alb  3.9  /  TBili  0.4  /  DBili  x   /  AST  34<H>  /  ALT  24  /  AlkPhos  86  01-13          Urinalysis Basic - ( 14 Jan 2024 06:40 )    Color: x / Appearance: x / SG: x / pH: x  Gluc: 118 mg/dL / Ketone: x  / Bili: x / Urobili: x   Blood: x / Protein: x / Nitrite: x   Leuk Esterase: x / RBC: x / WBC x   Sq Epi: x / Non Sq Epi: x / Bacteria: x        CAPILLARY BLOOD GLUCOSE      POCT Blood Glucose.: 127 mg/dL (14 Jan 2024 12:18)  POCT Blood Glucose.: 142 mg/dL (14 Jan 2024 09:30)  POCT Blood Glucose.: 141 mg/dL (13 Jan 2024 21:13)  POCT Blood Glucose.: 136 mg/dL (13 Jan 2024 17:12)        RADIOLOGY & ADDITIONAL TESTS:  Results Reviewed:   Imaging Personally Reviewed:  Electrocardiogram Personally Reviewed:

## 2024-01-14 NOTE — PROGRESS NOTE ADULT - ASSESSMENT
72 y/o F w/ PMH of bipolar dx vs schizo affective disorder, HTN, HLD, DMII presented after pt collapsed in the kitchen.  She was unresponsive on the floor for a few seconds and upon awakening was noted to be confused with garbled speech.  Pt is a poor historian.  As per ED records, pts daughter reports that she has been having episodes of garbled speech for the past week.  Code stroke called in ED.  CTH negative, CTA h/n and perfusion negative.  Pt admitted for r/o CVA vs seizure.     #Seizure vs syncope vs r/o CVA   - vEEG negative   - CTH, CTA h/n and perfusion study negative   - MRI brain still pending; attempted to call MRI x3 no response   - will get orthostatics today   - c/w asa, plavix (DAPT x 60 days for ICAD as per neuro)  - c/w statin  - Neuro stroke following, will f/u after MRI done     #Microcytic anemia   - Baseline h/h unknown   - low iron, ferritin, %sat consistent with iron def anemia  - reports getting routine colonoscopy and egd outpt  - last outpt scopes cancelled given episode of emesis, pt to re-schedule on outpt basis  - per pt, no abnormal scopes  - c/w venofer    #hypothyroidism  - TSH: 3.26, T4 7.5 1/14am  - will c/w home synthroid 25mcg qam    #DM Ii  - ISS and hypoglycemic protocol in place     #HTN / HLD  - c/w home meds     #Bipolar vs Schizophrenia/affective disorder   - c/w home meds     VTE ppx: lovenox     Dispo: MRI pending, PT pending    74 y/o F w/ PMH of bipolar dx vs schizo affective disorder, HTN, HLD, DMII presented after pt collapsed in the kitchen.  She was unresponsive on the floor for a few seconds and upon awakening was noted to be confused with garbled speech.  Pt is a poor historian.  As per ED records, pts daughter reports that she has been having episodes of garbled speech for the past week.  Code stroke called in ED.  CTH negative, CTA h/n and perfusion negative.  Pt admitted for r/o CVA vs seizure.     #Seizure vs syncope vs r/o CVA   - vEEG negative   - CTH, CTA h/n and perfusion study negative   - MRI brain still pending; attempted to call MRI x3 no response   - will get orthostatics today   - c/w asa, plavix (DAPT x 60 days for ICAD as per neuro)  - c/w statin  - Neuro stroke following, will f/u after MRI done     #Microcytic anemia   - Baseline h/h unknown   - low iron, ferritin, %sat consistent with iron def anemia  - reports getting routine colonoscopy and egd outpt  - last outpt scopes cancelled given episode of emesis, pt to re-schedule on outpt basis  - per pt, no abnormal scopes  - c/w venofer    #hypothyroidism  - TSH: 3.26, T4 7.5 1/14am  - will c/w home synthroid 25mcg qam    #DM Ii  - ISS and hypoglycemic protocol in place     #HTN / HLD  - c/w home meds     #Bipolar vs Schizophrenia/affective disorder   - c/w home meds     VTE ppx: lovenox     Dispo: MRI pending, PT pending

## 2024-01-15 LAB
GLUCOSE BLDC GLUCOMTR-MCNC: 113 MG/DL — HIGH (ref 70–99)
GLUCOSE BLDC GLUCOMTR-MCNC: 127 MG/DL — HIGH (ref 70–99)
GLUCOSE BLDC GLUCOMTR-MCNC: 127 MG/DL — HIGH (ref 70–99)
GLUCOSE BLDC GLUCOMTR-MCNC: 162 MG/DL — HIGH (ref 70–99)
GLUCOSE BLDC GLUCOMTR-MCNC: 167 MG/DL — HIGH (ref 70–99)
GLUCOSE BLDC GLUCOMTR-MCNC: 167 MG/DL — HIGH (ref 70–99)

## 2024-01-15 PROCEDURE — 99233 SBSQ HOSP IP/OBS HIGH 50: CPT

## 2024-01-15 RX ADMIN — ENOXAPARIN SODIUM 40 MILLIGRAM(S): 100 INJECTION SUBCUTANEOUS at 21:36

## 2024-01-15 RX ADMIN — Medication 25 MICROGRAM(S): at 05:46

## 2024-01-15 RX ADMIN — ATORVASTATIN CALCIUM 80 MILLIGRAM(S): 80 TABLET, FILM COATED ORAL at 21:35

## 2024-01-15 RX ADMIN — Medication 81 MILLIGRAM(S): at 10:40

## 2024-01-15 RX ADMIN — AMLODIPINE BESYLATE 5 MILLIGRAM(S): 2.5 TABLET ORAL at 05:46

## 2024-01-15 RX ADMIN — IRON SUCROSE 110 MILLIGRAM(S): 20 INJECTION, SOLUTION INTRAVENOUS at 10:40

## 2024-01-15 RX ADMIN — ARIPIPRAZOLE 30 MILLIGRAM(S): 15 TABLET ORAL at 10:40

## 2024-01-15 RX ADMIN — CLOPIDOGREL BISULFATE 75 MILLIGRAM(S): 75 TABLET, FILM COATED ORAL at 10:40

## 2024-01-15 RX ADMIN — PERPHENAZINE 8 MILLIGRAM(S): 8 TABLET, FILM COATED ORAL at 21:35

## 2024-01-15 NOTE — PROGRESS NOTE ADULT - ASSESSMENT
74 y/o F w/ PMH of bipolar dx vs schizo affective disorder, HTN, HLD, DMII presented after pt collapsed in the kitchen.  She was unresponsive on the floor for a few seconds and upon awakening was noted to be confused with garbled speech.  Pt is a poor historian.  As per ED records, pts daughter reports that she has been having episodes of garbled speech for the past week.  Code stroke called in ED.  CTH negative, CTA h/n and perfusion negative.  Pt admitted for r/o CVA vs seizure.     #Seizure vs syncope vs r/o CVA   - vEEG negative   - CTH, CTA h/n and perfusion study negative   - MRI brain still pending; MRI screening form faxed down this am, tentative MRI today   - orthostatics: laying SBP 150s, standing 130s  - c/w asa, plavix (DAPT x 60 days for ICAD as per neuro)  - c/w statin  - Neuro stroke following, will f/u after MRI done     #Microcytic anemia   - Baseline h/h unknown   - low iron, ferritin, %sat consistent with iron def anemia  - reports getting routine colonoscopy and egd outpt  - last outpt scopes cancelled given episode of emesis, pt to re-schedule on outpt basis  - per pt, no abnormal scopes  - c/w venofer    #hypothyroidism  - TSH: 3.26, T4 7.5 1/14am  - will c/w home synthroid 25mcg qam    #DM Ii  - ISS and hypoglycemic protocol in place     #HTN / HLD  - c/w home meds     #Bipolar vs Schizophrenia/affective disorder   - c/w home meds     VTE ppx: lovenox     Dispo: MRI pending, PT pending

## 2024-01-15 NOTE — PROGRESS NOTE ADULT - TIME BILLING
reviewing chart, reviewing labs/vitals, examining pt, interviewing pt, speaking with family over the phone, documenting.
reviewing chart, reviewing labs/vitals, examining pt, interviewing pt, documenting.
reviewing chart, reviewing labs/vitals, examining pt, interviewing pt, documenting.

## 2024-01-15 NOTE — PROGRESS NOTE ADULT - SUBJECTIVE AND OBJECTIVE BOX
[facility] Division of Hospital Medicine    Chief Complaint: seizure v. cva     INTERVAL HISTORY:  Overnight, no acute events.     Patient seen and examined at bedside eating breakfast. Reports feeling well. A&Ox3. Patient denies chest pain, abd pain, N/V, fever, chills, dysuria or any other complaints. Discharged focused.     MEDICATIONS  (STANDING):  amLODIPine   Tablet 5 milliGRAM(s) Oral daily  ARIPiprazole 30 milliGRAM(s) Oral daily  aspirin enteric coated 81 milliGRAM(s) Oral daily  atorvastatin 80 milliGRAM(s) Oral at bedtime  clopidogrel Tablet 75 milliGRAM(s) Oral daily  dextrose 5%. 1000 milliLiter(s) (50 mL/Hr) IV Continuous <Continuous>  dextrose 5%. 1000 milliLiter(s) (100 mL/Hr) IV Continuous <Continuous>  dextrose 50% Injectable 25 Gram(s) IV Push once  dextrose 50% Injectable 12.5 Gram(s) IV Push once  dextrose 50% Injectable 25 Gram(s) IV Push once  enoxaparin Injectable 40 milliGRAM(s) SubCutaneous every 24 hours  glucagon  Injectable 1 milliGRAM(s) IntraMuscular once  insulin lispro (ADMELOG) corrective regimen sliding scale   SubCutaneous three times a day before meals  iron sucrose IVPB 200 milliGRAM(s) IV Intermittent every 24 hours  levothyroxine 25 MICROGram(s) Oral daily  magnesium sulfate  IVPB 2 Gram(s) IV Intermittent once  perphenazine 8 milliGRAM(s) Oral at bedtime  sodium chloride 0.9%. 1000 milliLiter(s) (50 mL/Hr) IV Continuous <Continuous>    MEDICATIONS  (PRN):  acetaminophen     Tablet .. 650 milliGRAM(s) Oral every 6 hours PRN Temp greater or equal to 38C (100.4F), Mild Pain (1 - 3)  dextrose Oral Gel 15 Gram(s) Oral once PRN Blood Glucose LESS THAN 70 milliGRAM(s)/deciliter        I&O's Summary    14 Jan 2024 07:01  -  15 Unruly 2024 07:00  --------------------------------------------------------  IN: 1000 mL / OUT: 0 mL / NET: 1000 mL        PHYSICAL EXAM:  Vital Signs Last 24 Hrs  T(C): 36.6 (15 Unruly 2024 12:12), Max: 36.8 (14 Jan 2024 16:45)  T(F): 97.9 (15 Unruly 2024 12:12), Max: 98.3 (14 Jan 2024 16:45)  HR: 78 (15 Unruly 2024 12:12) (76 - 83)  BP: 127/72 (15 Unruly 2024 12:12) (118/62 - 140/76)  BP(mean): 85 (14 Jan 2024 16:45) (85 - 85)  RR: 16 (15 Unruly 2024 12:12) (16 - 18)  SpO2: 98% (15 Unruly 2024 12:12) (95% - 98%)    Parameters below as of 15 Unruly 2024 12:12  Patient On (Oxygen Delivery Method): room air    CONSTITUTIONAL: No apparent distress  HEENT: Normocephalic, Atraumatic.   RESPIRATORY:  lungs are clear to auscultation bilaterally, No crackles, rhonchi, wheezes  CARDIOVASCULAR: Regular rate and rhythm, No lower extremity edema  ABDOMEN: Soft, non-distended, nontender to palpation, +BS  MUSCLOSKELETAL: moving all 4 extremities spontaneously  PSYCH: thoughts linear, affect appropriate  NEUROLOGY: Alert, Oriented x3, follows commands       LABS:                        9.7    7.44  )-----------( 289      ( 14 Jan 2024 06:40 )             33.5     01-14    141  |  103  |  15.0  ----------------------------<  118<H>  3.7   |  22.0  |  0.50    Ca    9.5      14 Jan 2024 06:40            Urinalysis Basic - ( 14 Jan 2024 06:40 )    Color: x / Appearance: x / SG: x / pH: x  Gluc: 118 mg/dL / Ketone: x  / Bili: x / Urobili: x   Blood: x / Protein: x / Nitrite: x   Leuk Esterase: x / RBC: x / WBC x   Sq Epi: x / Non Sq Epi: x / Bacteria: x        CAPILLARY BLOOD GLUCOSE      POCT Blood Glucose.: 167 mg/dL (15 Unruly 2024 11:54)  POCT Blood Glucose.: 127 mg/dL (15 Unruly 2024 07:53)  POCT Blood Glucose.: 126 mg/dL (14 Jan 2024 21:05)  POCT Blood Glucose.: 125 mg/dL (14 Jan 2024 17:14)        RADIOLOGY & ADDITIONAL TESTS:  Results Reviewed                                           [facility] Division of Hospital Medicine    Chief Complaint: seizure v. cva     INTERVAL HISTORY:  Overnight, no acute events.     Patient seen and examined at bedside eating breakfast. Reports feeling well. A&Ox3. Patient denies chest pain, abd pain, N/V, fever, chills, dysuria or any other complaints. Discharged focused.     MEDICATIONS  (STANDING):  amLODIPine   Tablet 5 milliGRAM(s) Oral daily  ARIPiprazole 30 milliGRAM(s) Oral daily  aspirin enteric coated 81 milliGRAM(s) Oral daily  atorvastatin 80 milliGRAM(s) Oral at bedtime  clopidogrel Tablet 75 milliGRAM(s) Oral daily  dextrose 5%. 1000 milliLiter(s) (50 mL/Hr) IV Continuous <Continuous>  dextrose 5%. 1000 milliLiter(s) (100 mL/Hr) IV Continuous <Continuous>  dextrose 50% Injectable 25 Gram(s) IV Push once  dextrose 50% Injectable 12.5 Gram(s) IV Push once  dextrose 50% Injectable 25 Gram(s) IV Push once  enoxaparin Injectable 40 milliGRAM(s) SubCutaneous every 24 hours  glucagon  Injectable 1 milliGRAM(s) IntraMuscular once  insulin lispro (ADMELOG) corrective regimen sliding scale   SubCutaneous three times a day before meals  iron sucrose IVPB 200 milliGRAM(s) IV Intermittent every 24 hours  levothyroxine 25 MICROGram(s) Oral daily  magnesium sulfate  IVPB 2 Gram(s) IV Intermittent once  perphenazine 8 milliGRAM(s) Oral at bedtime  sodium chloride 0.9%. 1000 milliLiter(s) (50 mL/Hr) IV Continuous <Continuous>    MEDICATIONS  (PRN):  acetaminophen     Tablet .. 650 milliGRAM(s) Oral every 6 hours PRN Temp greater or equal to 38C (100.4F), Mild Pain (1 - 3)  dextrose Oral Gel 15 Gram(s) Oral once PRN Blood Glucose LESS THAN 70 milliGRAM(s)/deciliter        I&O's Summary    14 Jan 2024 07:01  -  15 Unruly 2024 07:00  --------------------------------------------------------  IN: 1000 mL / OUT: 0 mL / NET: 1000 mL        PHYSICAL EXAM:  Vital Signs Last 24 Hrs  T(C): 36.6 (15 Unruly 2024 12:12), Max: 36.8 (14 Jan 2024 16:45)  T(F): 97.9 (15 Unruly 2024 12:12), Max: 98.3 (14 Jan 2024 16:45)  HR: 78 (15 Unruly 2024 12:12) (76 - 83)  BP: 127/72 (15 Unruly 2024 12:12) (118/62 - 140/76)  BP(mean): 85 (14 Jan 2024 16:45) (85 - 85)  RR: 16 (15 Unruly 2024 12:12) (16 - 18)  SpO2: 98% (15 Unruly 2024 12:12) (95% - 98%)    Parameters below as of 15 Unruly 2024 12:12  Patient On (Oxygen Delivery Method): room air    CONSTITUTIONAL: No apparent distress  HEENT: Normocephalic, Atraumatic.   RESPIRATORY:  lungs are clear to auscultation bilaterally, No crackles, rhonchi, wheezes  CARDIOVASCULAR: Regular rate and rhythm, No lower extremity edema  ABDOMEN: Soft, non-distended, nontender to palpation, +BS  MUSCLOSKELETAL: moving all 4 extremities spontaneously  PSYCH: thoughts linear, affect appropriate  NEUROLOGY: Alert, Oriented x3, follows commands       LABS:                        9.7    7.44  )-----------( 289      ( 14 Jan 2024 06:40 )             33.5     01-14    141  |  103  |  15.0  ----------------------------<  118<H>  3.7   |  22.0  |  0.50    Ca    9.5      14 Jan 2024 06:40            Urinalysis Basic - ( 14 Jan 2024 06:40 )    Color: x / Appearance: x / SG: x / pH: x  Gluc: 118 mg/dL / Ketone: x  / Bili: x / Urobili: x   Blood: x / Protein: x / Nitrite: x   Leuk Esterase: x / RBC: x / WBC x   Sq Epi: x / Non Sq Epi: x / Bacteria: x        CAPILLARY BLOOD GLUCOSE      POCT Blood Glucose.: 167 mg/dL (15 Unruly 2024 11:54)  POCT Blood Glucose.: 127 mg/dL (15 Unruly 2024 07:53)  POCT Blood Glucose.: 126 mg/dL (14 Jan 2024 21:05)  POCT Blood Glucose.: 125 mg/dL (14 Jan 2024 17:14)        RADIOLOGY & ADDITIONAL TESTS:  Results Reviewed                                           [facility] Division of Hospital Medicine    Chief Complaint: seizure v. cva     INTERVAL HISTORY:  Overnight, no acute events.     Patient seen and examined at bedside eating breakfast. Reports feeling well. A&Ox3. Patient denies chest pain, abd pain, N/V, fever, chills, dysuria or any other complaints. Discharged focused.     MEDICATIONS  (STANDING):  amLODIPine   Tablet 5 milliGRAM(s) Oral daily  ARIPiprazole 30 milliGRAM(s) Oral daily  aspirin enteric coated 81 milliGRAM(s) Oral daily  atorvastatin 80 milliGRAM(s) Oral at bedtime  clopidogrel Tablet 75 milliGRAM(s) Oral daily  dextrose 5%. 1000 milliLiter(s) (50 mL/Hr) IV Continuous <Continuous>  dextrose 5%. 1000 milliLiter(s) (100 mL/Hr) IV Continuous <Continuous>  dextrose 50% Injectable 25 Gram(s) IV Push once  dextrose 50% Injectable 12.5 Gram(s) IV Push once  dextrose 50% Injectable 25 Gram(s) IV Push once  enoxaparin Injectable 40 milliGRAM(s) SubCutaneous every 24 hours  glucagon  Injectable 1 milliGRAM(s) IntraMuscular once  insulin lispro (ADMELOG) corrective regimen sliding scale   SubCutaneous three times a day before meals  iron sucrose IVPB 200 milliGRAM(s) IV Intermittent every 24 hours  levothyroxine 25 MICROGram(s) Oral daily  magnesium sulfate  IVPB 2 Gram(s) IV Intermittent once  perphenazine 8 milliGRAM(s) Oral at bedtime  sodium chloride 0.9%. 1000 milliLiter(s) (50 mL/Hr) IV Continuous <Continuous>    MEDICATIONS  (PRN):  acetaminophen     Tablet .. 650 milliGRAM(s) Oral every 6 hours PRN Temp greater or equal to 38C (100.4F), Mild Pain (1 - 3)  dextrose Oral Gel 15 Gram(s) Oral once PRN Blood Glucose LESS THAN 70 milliGRAM(s)/deciliter        I&O's Summary    14 Jan 2024 07:01  -  15 Unruly 2024 07:00  --------------------------------------------------------  IN: 1000 mL / OUT: 0 mL / NET: 1000 mL        PHYSICAL EXAM:  Vital Signs Last 24 Hrs  T(C): 36.6 (15 Unruly 2024 12:12), Max: 36.8 (14 Jan 2024 16:45)  T(F): 97.9 (15 Unruly 2024 12:12), Max: 98.3 (14 Jan 2024 16:45)  HR: 78 (15 Unruly 2024 12:12) (76 - 83)  BP: 127/72 (15 Unruly 2024 12:12) (118/62 - 140/76)  BP(mean): 85 (14 Jan 2024 16:45) (85 - 85)  RR: 16 (15 Unruly 2024 12:12) (16 - 18)  SpO2: 98% (15 Unruly 2024 12:12) (95% - 98%)    Parameters below as of 15 Unruly 2024 12:12  Patient On (Oxygen Delivery Method): room air    CONSTITUTIONAL: No apparent distress  HEENT: Normocephalic, Atraumatic.   RESPIRATORY:  lungs are clear to auscultation bilaterally, No crackles, rhonchi, wheezes  CARDIOVASCULAR: Regular rate and rhythm, No lower extremity edema  ABDOMEN: Soft, non-distended, nontender to palpation, +BS  MUSCLOSKELETAL: moving all 4 extremities spontaneously  PSYCH: thoughts linear, affect appropriate  NEUROLOGY: Alert, Oriented x3, follows commands       LABS:                        9.7    7.44  )-----------( 289      ( 14 Jan 2024 06:40 )             33.5     01-14    141  |  103  |  15.0  ----------------------------<  118<H>  3.7   |  22.0  |  0.50    Ca    9.5      14 Jan 2024 06:40            Urinalysis Basic - ( 14 Jan 2024 06:40 )    Color: x / Appearance: x / SG: x / pH: x  Gluc: 118 mg/dL / Ketone: x  / Bili: x / Urobili: x   Blood: x / Protein: x / Nitrite: x   Leuk Esterase: x / RBC: x / WBC x   Sq Epi: x / Non Sq Epi: x / Bacteria: x        CAPILLARY BLOOD GLUCOSE      POCT Blood Glucose.: 167 mg/dL (15 Unruly 2024 11:54)  POCT Blood Glucose.: 127 mg/dL (15 Unruly 2024 07:53)  POCT Blood Glucose.: 126 mg/dL (14 Jan 2024 21:05)  POCT Blood Glucose.: 125 mg/dL (14 Jan 2024 17:14)        RADIOLOGY & ADDITIONAL TESTS:  Results Reviewed

## 2024-01-16 VITALS
SYSTOLIC BLOOD PRESSURE: 140 MMHG | HEART RATE: 76 BPM | RESPIRATION RATE: 18 BRPM | TEMPERATURE: 98 F | OXYGEN SATURATION: 95 % | DIASTOLIC BLOOD PRESSURE: 81 MMHG

## 2024-01-16 LAB
GLUCOSE BLDC GLUCOMTR-MCNC: 124 MG/DL — HIGH (ref 70–99)
GLUCOSE BLDC GLUCOMTR-MCNC: 138 MG/DL — HIGH (ref 70–99)

## 2024-01-16 PROCEDURE — 85610 PROTHROMBIN TIME: CPT

## 2024-01-16 PROCEDURE — 99285 EMERGENCY DEPT VISIT HI MDM: CPT

## 2024-01-16 PROCEDURE — 83540 ASSAY OF IRON: CPT

## 2024-01-16 PROCEDURE — 85730 THROMBOPLASTIN TIME PARTIAL: CPT

## 2024-01-16 PROCEDURE — 82746 ASSAY OF FOLIC ACID SERUM: CPT

## 2024-01-16 PROCEDURE — 85027 COMPLETE CBC AUTOMATED: CPT

## 2024-01-16 PROCEDURE — 95711 VEEG 2-12 HR UNMONITORED: CPT

## 2024-01-16 PROCEDURE — 95700 EEG CONT REC W/VID EEG TECH: CPT

## 2024-01-16 PROCEDURE — 70551 MRI BRAIN STEM W/O DYE: CPT

## 2024-01-16 PROCEDURE — 82728 ASSAY OF FERRITIN: CPT

## 2024-01-16 PROCEDURE — 82962 GLUCOSE BLOOD TEST: CPT

## 2024-01-16 PROCEDURE — 80307 DRUG TEST PRSMV CHEM ANLYZR: CPT

## 2024-01-16 PROCEDURE — 81001 URINALYSIS AUTO W/SCOPE: CPT

## 2024-01-16 PROCEDURE — 80053 COMPREHEN METABOLIC PANEL: CPT

## 2024-01-16 PROCEDURE — 85025 COMPLETE CBC W/AUTO DIFF WBC: CPT

## 2024-01-16 PROCEDURE — 93005 ELECTROCARDIOGRAM TRACING: CPT

## 2024-01-16 PROCEDURE — 86803 HEPATITIS C AB TEST: CPT

## 2024-01-16 PROCEDURE — 70496 CT ANGIOGRAPHY HEAD: CPT | Mod: MA

## 2024-01-16 PROCEDURE — 80048 BASIC METABOLIC PNL TOTAL CA: CPT

## 2024-01-16 PROCEDURE — 82550 ASSAY OF CK (CPK): CPT

## 2024-01-16 PROCEDURE — 86850 RBC ANTIBODY SCREEN: CPT

## 2024-01-16 PROCEDURE — 0042T: CPT | Mod: MA

## 2024-01-16 PROCEDURE — 84100 ASSAY OF PHOSPHORUS: CPT

## 2024-01-16 PROCEDURE — 86901 BLOOD TYPING SEROLOGIC RH(D): CPT

## 2024-01-16 PROCEDURE — 83036 HEMOGLOBIN GLYCOSYLATED A1C: CPT

## 2024-01-16 PROCEDURE — 95714 VEEG EA 12-26 HR UNMNTR: CPT

## 2024-01-16 PROCEDURE — 86900 BLOOD TYPING SEROLOGIC ABO: CPT

## 2024-01-16 PROCEDURE — 99239 HOSP IP/OBS DSCHRG MGMT >30: CPT

## 2024-01-16 PROCEDURE — 70551 MRI BRAIN STEM W/O DYE: CPT | Mod: 26

## 2024-01-16 PROCEDURE — 84484 ASSAY OF TROPONIN QUANT: CPT

## 2024-01-16 PROCEDURE — 83735 ASSAY OF MAGNESIUM: CPT

## 2024-01-16 PROCEDURE — 96374 THER/PROPH/DIAG INJ IV PUSH: CPT

## 2024-01-16 PROCEDURE — 83550 IRON BINDING TEST: CPT

## 2024-01-16 PROCEDURE — 84466 ASSAY OF TRANSFERRIN: CPT

## 2024-01-16 PROCEDURE — 83605 ASSAY OF LACTIC ACID: CPT

## 2024-01-16 PROCEDURE — 70450 CT HEAD/BRAIN W/O DYE: CPT | Mod: MA

## 2024-01-16 PROCEDURE — 99233 SBSQ HOSP IP/OBS HIGH 50: CPT

## 2024-01-16 PROCEDURE — 84436 ASSAY OF TOTAL THYROXINE: CPT

## 2024-01-16 PROCEDURE — 36415 COLL VENOUS BLD VENIPUNCTURE: CPT

## 2024-01-16 PROCEDURE — 93306 TTE W/DOPPLER COMPLETE: CPT

## 2024-01-16 PROCEDURE — 82607 VITAMIN B-12: CPT

## 2024-01-16 PROCEDURE — 82553 CREATINE MB FRACTION: CPT

## 2024-01-16 PROCEDURE — 80061 LIPID PANEL: CPT

## 2024-01-16 PROCEDURE — 84443 ASSAY THYROID STIM HORMONE: CPT

## 2024-01-16 PROCEDURE — 95813 EEG EXTND MNTR 61-119 MIN: CPT

## 2024-01-16 PROCEDURE — 70498 CT ANGIOGRAPHY NECK: CPT | Mod: MA

## 2024-01-16 RX ORDER — ASPIRIN/CALCIUM CARB/MAGNESIUM 324 MG
1 TABLET ORAL
Qty: 30 | Refills: 0
Start: 2024-01-16 | End: 2024-02-14

## 2024-01-16 RX ORDER — ATORVASTATIN CALCIUM 80 MG/1
1 TABLET, FILM COATED ORAL
Qty: 30 | Refills: 0
Start: 2024-01-16 | End: 2024-02-14

## 2024-01-16 RX ORDER — LEVOTHYROXINE SODIUM 125 MCG
1 TABLET ORAL
Qty: 30 | Refills: 0
Start: 2024-01-16 | End: 2024-02-14

## 2024-01-16 RX ORDER — SIMVASTATIN 20 MG/1
1 TABLET, FILM COATED ORAL
Refills: 0 | DISCHARGE

## 2024-01-16 RX ORDER — CLOPIDOGREL BISULFATE 75 MG/1
1 TABLET, FILM COATED ORAL
Qty: 30 | Refills: 0
Start: 2024-01-16 | End: 2024-02-14

## 2024-01-16 RX ADMIN — ARIPIPRAZOLE 30 MILLIGRAM(S): 15 TABLET ORAL at 11:21

## 2024-01-16 RX ADMIN — Medication 25 MICROGRAM(S): at 05:13

## 2024-01-16 RX ADMIN — CLOPIDOGREL BISULFATE 75 MILLIGRAM(S): 75 TABLET, FILM COATED ORAL at 11:22

## 2024-01-16 RX ADMIN — IRON SUCROSE 110 MILLIGRAM(S): 20 INJECTION, SOLUTION INTRAVENOUS at 11:22

## 2024-01-16 RX ADMIN — AMLODIPINE BESYLATE 5 MILLIGRAM(S): 2.5 TABLET ORAL at 05:13

## 2024-01-16 RX ADMIN — Medication 81 MILLIGRAM(S): at 11:21

## 2024-01-16 NOTE — DISCHARGE NOTE PROVIDER - CARE PROVIDERS DIRECT ADDRESSES
,DirectAddress_Unknown,[email or web address] ,DirectAddress_Unknown,[email or web address]

## 2024-01-16 NOTE — DISCHARGE NOTE PROVIDER - DISCHARGE SERVICE FOR PATIENT
12.2 on the discharge service for the patient. I have reviewed and made amendments to the documentation where necessary.

## 2024-01-16 NOTE — DISCHARGE NOTE NURSING/CASE MANAGEMENT/SOCIAL WORK - CASE MANAGER'S NAME
NATALIA CEE RN [facility]. NATALIA CEE RN [facility].

## 2024-01-16 NOTE — DISCHARGE NOTE PROVIDER - NSDCCPCAREPLAN_GEN_ALL_CORE_FT
PRINCIPAL DISCHARGE DIAGNOSIS  Diagnosis: Metabolic encephalopathy  Assessment and Plan of Treatment: MRI negative for CVA.  Follow up with primary doctor.      SECONDARY DISCHARGE DIAGNOSES  Diagnosis: Carotid atherosclerosis, unspecified laterality  Assessment and Plan of Treatment: Continue current medications as prescibed.  Simvastatin changed to Lipitor.  ASA and Plavix to be continued for 60days.  Follow up with primary doctor and neurology.    Diagnosis: Bipolar disorder  Assessment and Plan of Treatment: Continue current medications as prescribed.  Follow up with primary doctor.    Diagnosis: DM (diabetes mellitus)  Assessment and Plan of Treatment: Continue current medications as prescribed.  Follow up with primary doctor.    Diagnosis: Hypothyroidism  Assessment and Plan of Treatment: Continue current medications as prescribed.  Follow up with primary doctor.  Will need repeat TFTs in 4 weeks.

## 2024-01-16 NOTE — DISCHARGE NOTE PROVIDER - ATTENDING DISCHARGE PHYSICAL EXAMINATION:
CONSTITUTIONAL: No apparent distress  HEENT: Normocephalic, Atraumatic.   RESPIRATORY:  lungs are clear to auscultation bilaterally, No crackles, rhonchi, wheezes  CARDIOVASCULAR: Regular rate and rhythm, No lower extremity edema  ABDOMEN: Soft, non-distended, nontender to palpation, +BS  MUSCLOSKELETAL: moving all 4 extremities spontaneously  PSYCH: thoughts linear, affect appropriate  NEUROLOGY: Alert, Oriented x3, follows commands

## 2024-01-16 NOTE — PHYSICAL THERAPY INITIAL EVALUATION ADULT - PERTINENT HX OF CURRENT PROBLEM, REHAB EVAL
Pt is 72 y/o F w/ PMH of bipolar dx vs schizo affective disorder, HTN, HLD, DMII presented after pt collapsed in the kitchen.  She was unresponsive on the floor for a few seconds and upon awakening was noted to be confused with garbled speech.  As per ED records, pts daughter reports that she has been having episodes of garbled speech for the past week. CTH negative, CTA h/n and perfusion negative.  Pt admitted for r/o CVA vs seizure.

## 2024-01-16 NOTE — DISCHARGE NOTE NURSING/CASE MANAGEMENT/SOCIAL WORK - PATIENT PORTAL LINK FT
You can access the FollowMyHealth Patient Portal offered by [facility] by registering at the following website: [email or web address]. By joining [employer]’s FollowMyHealth portal, you will also be able to view your health information using other applications (apps) compatible with our system. You can access the FollowMyHealth Patient Portal offered by [facility] by registering at the following website: [email or web address]. By joining [employer]’s FollowMyHealth portal, you will also be able to view your health information using other applications (apps) compatible with our system.

## 2024-01-16 NOTE — DISCHARGE NOTE PROVIDER - PROVIDER TOKENS
FREE:[LAST:[Primary doctor],PHONE:[(   )    -],FAX:[(   )    -]],PROVIDER:[TOKEN:[[id number]]] FREE:[LAST:[Primary doctor],PHONE:[(   )    -],FAX:[(   )    -]],PROVIDER:[TOKEN:[[id number]]]

## 2024-01-16 NOTE — PROGRESS NOTE ADULT - NS ATTEND AMEND GEN_ALL_CORE FT
Agree with PA's assessment and plan.  No stroke appreciated on MRI brain. Pt is back at her baseline. Metabolic vs infectious cause of symptoms is likely  will sign off, please call with questions

## 2024-01-16 NOTE — DISCHARGE NOTE NURSING/CASE MANAGEMENT/SOCIAL WORK - NSDCPEFALRISK_GEN_ALL_CORE
For information on Fall & Injury Prevention, visit: [email or web address] OR  [email or web address] OR  https://www.cdc.gov/steadi/patient.html For information on Fall & Injury Prevention, visit: [email or web address] OR  [email or web address] OR  https://www.cdc.gov/steadi/patient.html

## 2024-01-16 NOTE — DISCHARGE NOTE PROVIDER - HOSPITAL COURSE
74 y/o F w/ PMH of bipolar dx vs schizo affective disorder, HTN, HLD, DMII presented after pt collapsed in the kitchen.  She was unresponsive on the floor for a few seconds and upon awakening was noted to be confused with garbled speech.  Pt is a poor historian.  As per ED records, patient's daughter reports that she has been having episodes of garbled speech for the past week.  Code stroke called in ED.  CTH negative, CTA h/n and perfusion negative.  Pt admitted for r/o CVA vs seizure. vEEG negative.  MRI brain negative.  DAPT x 60 days for ICAD as per neuro recommendations.  Orthostatics reviewed.  Patient also noted with microcytic anemia.  Low iron, ferritin, %sat consistent with iron def anemia. Patient treated with IV Venofer and to continue Iron supplement on discharge.  TSH: 3.26, T4 7.5.  Synthroid started. PT followed and recommended home.  Patient stable for discharge to home with outpatient follow up with primary doctor and neurology.      Vital Signs Last 24 Hrs  T(C): 36.4 (16 Jan 2024 09:09), Max: 36.6 (15 Unruly 2024 20:40)  T(F): 97.5 (16 Jan 2024 09:09), Max: 97.8 (15 Unruly 2024 20:40)  HR: 80 (16 Jan 2024 09:09) (69 - 81)  BP: 127/68 (16 Jan 2024 09:09) (126/59 - 137/62)  BP(mean): 88 (16 Jan 2024 09:09) (88 - 88)  RR: 18 (16 Jan 2024 09:09) (18 - 18)  SpO2: 97% (16 Jan 2024 09:09) (96% - 97%)    Parameters below as of 16 Jan 2024 09:09  Patient On (Oxygen Delivery Method): room air    PHYSICAL EXAM:  GENERAL: NAD  HEAD:  Atraumatic, Normocephalic  NECK: Supple, No JVD, Normal thyroid  NERVOUS SYSTEM:  Alert & Oriented X3, Forgetful, Good concentration; Motor Strength 5/5 B/L upper and lower extremities  CHEST/LUNG: Clear to auscultation bilaterally  HEART: Regular rate and rhythm; No murmurs, rubs, or gallops  ABDOMEN: Soft, Nontender, Nondistended; Bowel sounds present  EXTREMITIES:  2+ Peripheral Pulses, No clubbing, cyanosis, or edema  SKIN: No rashes or lesions

## 2024-01-16 NOTE — DISCHARGE NOTE PROVIDER - CARE PROVIDER_API CALL
Primary doctor,   Phone: (   )    -  Fax: (   )    -  Follow Up Time:     Lamberto Reid  Neurology  [address] [place] [address], [facility] [address]  [place] [address]  Phone: [phone]  Fax: [phone]  Follow Up Time:    Primary doctor,   Phone: (   )    -  Fax: (   )    -  Follow Up Time:     Lamberto Reid  Neurology  [address] [place] [address], [facility] [address]  [place] [address]  Phone: [phone]  Fax: [phone]  Follow Up Time:

## 2024-01-16 NOTE — DISCHARGE NOTE PROVIDER - NSDCMRMEDTOKEN_GEN_ALL_CORE_FT
Abilify 30 mg oral tablet: 1 tab(s) orally once a day (at bedtime)  amLODIPine 5 mg oral tablet: 1 tab(s) orally once a day  aspirin 81 mg oral delayed release tablet: 1 tab(s) orally once a day  atorvastatin 80 mg oral tablet: 1 tab(s) orally once a day (at bedtime)  clopidogrel 75 mg oral tablet: 1 tab(s) orally once a day  Jardiance 25 mg oral tablet: 1 tab(s) orally once a day  levothyroxine 25 mcg (0.025 mg) oral tablet: 1 tab(s) orally once a day  metFORMIN 500 mg oral tablet: 2 tab(s) orally 2 times a day  perphenazine 8 mg oral tablet: 1 tab(s) orally once a day (at bedtime)

## 2024-01-16 NOTE — PROGRESS NOTE ADULT - ASSESSMENT
ASSESSMENT:   74 y/o F w/ PMH of bipolar dx vs schizo affective disorder, HTN, HLD, DMII who presented s/p witnessed fall with garbled speech and altered mental status on the morning of 1/11/24. Per EMS, the pt was making breakfast at 0850 that morning when she suddenly fell and possibly was aphasic. At neurological exam patient was post sedation but had what appeared to be a right hemiparesis and confusion. CT head demonstrated no acute infarction or hemorrhage, upon further discussion patient was noted by daughter via telephone call with ER to have had difficulty speaking and confusion over the last week therefor tenecteplase was deferred as outside of window.  CT angiogram head/neck showed upon review intracranial atherosclerotic disease, due to no large vessel occlusion therefor not thrombectomy candidate. Pt w/ neurological improvement today, MRI brain unrevealing. Etiology concerning for encephalopathy for metabolic derangement.  Consider cardiac w/u for syncope as well.    NEURO:   -Neurologically intact  -Continue close monitoring for neurologic deterioration    -Stroke neuro checks q 4h  -SBP range ~120-130mmHg, for now given neurologically tolerating. Underlying atherosclerotic disease, would avoid rapid fluctuations and hypotension   -Continue home statin regimen as LDL < 70  -MRI Brain w/o noted -no acute infarct  -12 hr vEEG report as noted, no seizures recorded, may d/c   -ANTITHROMBOTIC THERAPY: ASA 81mg daily, add Clopidogrel 75mg daily for anticipated 90 day course if not contraindicated  -Dysphagia screen: PASSED (01.11.24 @ 1840), initially FAILED due to lethargy - pt tolerating DASH diet  -Physical therapy/OT/Speech eval/treatment.  -Recommend outpatient follow up with neurology in 4-6 weeks   -No further recommendations from the Stroke team  -Please call with any questions     CARDIOVASCULAR:   -TTE as noted  -Pending orthostatics per primary team's recs  -Cardiac monitoring w/ telemetry for now, further evaluation pending findings of noted workup                        HEMATOLOGY:   -H/H 9.7/33.5, Platelets 289, MCV 70.4, Ferritin 9, B12 1655   -Likely iron deficiency anemia, continue close monitoring   -Patient should have all age and risk appropriate malignancy screenings with PCP or sooner if clinically suspected, hematology eval for anemia, venefir in progress     DVT ppx: LMWH [x]    PULMONARY:   -Protecting airway, saturating well   -Obtain CXR: rule out infectious process , also s/p fall     RENAL:   -BUN/Cr 15.0/0.50, monitor urine output, maintain adequate hydration    -Potassium 3.7, all other lytes WNL, replenish PRN as indicated      Na Goal:  135-145     [place]aly: N/A    ID:   -Lactate 5.6--> 2.0 w/o intervention, reassess as indicated    -Afebrile, no leukocytosis, monitor for si/sx of infection     DISPOSITION: Rehab or home depending on PT eval once stable and workup is complete    CORE MEASURES:        Admission NIHSS: 7     Tenecteplase : [] YES [x] NO      LDL/HDL/A1C: 35/91/6.2     Depression Screen- if depression hx and/or present      Statin Therapy: as noted      Dysphagia Screen: [x] PASS [] FAIL     Smoking [] YES [x] NO     Afib [] YES [x] NO     Stroke Education [x] YES [] NO    Obtain screening lower extremity venous ultrasound in patients who meet 1 or more of the following criteria as patient is high risk for DVT/PE on admission:   [] History of DVT/PE  []Hypercoagulable states (Factor V Leiden, Cancer, OCP, etc. )  []Prolonged immobility (hemiplegia/hemiparesis/post operative or any other extended immobilization)  [] Transferred from outside facility (Rehab or Long term care)  [] Age </= to 50

## 2024-01-16 NOTE — PHYSICAL THERAPY INITIAL EVALUATION ADULT - ADDITIONAL COMMENTS
Pt reports independent PTA, +, independent ADLs, lives with significant other in [place] home. Spouse has home health aide secondary to cancer diagnosis

## 2024-01-16 NOTE — PROGRESS NOTE ADULT - SUBJECTIVE AND OBJECTIVE BOX
Preliminary note, offical recommendations pending attending review/signature   [facility] Stroke Team  Progress Note     HPI:  74 y/o F w/ PMH of bipolar dx vs schizo affective disorder, HTN, HLD, DMII who presented s/p witnessed fall with garbled speech and altered mental status. Per EMS, the pt was making breakfast at 0850 1/11/24   when she suddenly became unable to speak, fell down,, and the pt's home health aide called 911. Pt initially presented to the ED completely aphasic w/ only moaning and no intelligible speech, and R facial droop, but moving all extremities. At the time of initial consult, the pt had been given Versed IVP for sedation due to agitation. After ED team spoke with the pt's daughter via phone, it was established that the patient had "difficulty speaking" and was confused for ~1 week and LKW was no longer clear. Pt admitted for stroke workup.    Admission NIHSS: 7    SUBJECTIVE: No events overnight.  No new neurologic complaints.  ROS reported negative unless otherwise noted.    acetaminophen     Tablet .. 650 milliGRAM(s) Oral every 6 hours PRN  amLODIPine   Tablet 5 milliGRAM(s) Oral daily  ARIPiprazole 30 milliGRAM(s) Oral daily  aspirin enteric coated 81 milliGRAM(s) Oral daily  atorvastatin 80 milliGRAM(s) Oral at bedtime  clopidogrel Tablet 75 milliGRAM(s) Oral daily  dextrose 5%. 1000 milliLiter(s) IV Continuous <Continuous>  dextrose 5%. 1000 milliLiter(s) IV Continuous <Continuous>  dextrose 50% Injectable 25 Gram(s) IV Push once  dextrose 50% Injectable 12.5 Gram(s) IV Push once  dextrose 50% Injectable 25 Gram(s) IV Push once  dextrose Oral Gel 15 Gram(s) Oral once PRN  enoxaparin Injectable 40 milliGRAM(s) SubCutaneous every 24 hours  glucagon  Injectable 1 milliGRAM(s) IntraMuscular once  insulin lispro (ADMELOG) corrective regimen sliding scale   SubCutaneous three times a day before meals  levothyroxine 25 MICROGram(s) Oral daily  magnesium sulfate  IVPB 2 Gram(s) IV Intermittent once  perphenazine 8 milliGRAM(s) Oral at bedtime  sodium chloride 0.9%. 1000 milliLiter(s) IV Continuous <Continuous>      PHYSICAL EXAM:   Vital Signs Last 24 Hrs  T(C): 36.4 (16 Jan 2024 09:09), Max: 36.6 (15 Unruly 2024 12:12)  T(F): 97.5 (16 Jan 2024 09:09), Max: 97.9 (15 Unruly 2024 12:12)  HR: 80 (16 Jan 2024 09:09) (69 - 81)  BP: 127/68 (16 Jan 2024 09:09) (126/59 - 137/62)  BP(mean): 88 (16 Jan 2024 09:09) (88 - 88)  RR: 18 (16 Jan 2024 09:09) (16 - 18)  SpO2: 97% (16 Jan 2024 09:09) (96% - 98%)    Parameters below as of 16 Jan 2024 09:09  Patient On (Oxygen Delivery Method): room air    EXAM PENDING     General: NAD , left nasal ecchymosis     Neurologic Exam:    Mental status: The patient upon arrival was sitting up in bed, alert and oriented to self, time, place, and situation. The patient is able to name objects, follow commands, repeat sentences.    Cranial nerves: Mild dysarthria still present. Pupils equal and react symmetrically to light. Attends to movement all visual fields. Extraocular motion is full with no nystagmus. There is no ptosis. Facial sensation is intact. Facial symmetry intact.    Motor: There is normal bulk and tone.  There is no tremor.  Strength is 5/5 in the right arm.  Strength is 5/5 in the right leg.  Strength is 5/5 in the left arm.  Strength is 5/5 in the left leg.    Sensation: Intact to light touch and pin in 4 extremities    Cerebellar: There is no gross dysmetria appreciated     Gait : deferred    LABS:       IMAGING: Reviewed by me.      MR Head No Cont (01.16.24 @ 04:28)   No evidence for intracranial mass, acute territorial infarct, acute   intracranial hemorrhage, or midline shift.    EEG Classification / Summary: (01.12.24 @ 10:59)  Normal  EEG in the awake, drowsy, asleep state(s).    Clinical Impression:  There were no epileptiform abnormalities recorded.      TTE W or WO Ultrasound Enhancing Agent (01.11.24 @ 17:24)  CONCLUSIONS:     1. Left ventricular cavity is normal. Left ventricular wall thickness is normal. Left ventricular systolic function is hyperdynamic with an ejection fraction visually estimated at 70 to 75 %.   2. There is mild (grade 1) left ventricular diastolic dysfunction, with elevated filling pressure.   3. Normal right ventricular cavity size and normal systolic function.   4. The left atrium is normal.   5. The right atrium is normal in size.   6. There is moderate calcification of the mitral valve annulus.   7. Mild mitral valve leaflet calcification.   8. No mitral regurgitation.   9. Fibrocalcific aortic valve sclerosis without stenosis.  10. Trace tricuspid regurgitation.  11. No pericardial effusion seen.  12. Agitated saline injection was negative for intracardiac shunt.    CT Brain, CTA Brain, CTA Neck, CT Brain Perfusion Stroke Protocol w/ IV Cont (01.11.24 @ 10:03)  IMPRESSION:  CT BRAIN  1. No evidence of acute hemorrhage, territorial infarct or vasogenic   edema.  2. Additional findings described in detail above.    CT PERFUSION  1. No predicted core infarct.  2. No evidence of active ischemia-tissue at risk.    CTA  1. Vertebral arteries patent, with right-sided dominance.  2. No evidence of significant stenosis, occlusion or dissection bilateral   extracranial carotid arteries.  3. No evidence of large vessel occlusion, definitive aneurysm or vascular   malformation intracranial circulation. Additional findings, including   anatomic variants, described above.  4. Additional findings described in detail above.     Preliminary note, offical recommendations pending attending review/signature   [facility] Stroke Team  Progress Note     HPI:  74 y/o F w/ PMH of bipolar dx vs schizo affective disorder, HTN, HLD, DMII who presented s/p witnessed fall with garbled speech and altered mental status. Per EMS, the pt was making breakfast at 0850 1/11/24   when she suddenly became unable to speak, fell down,, and the pt's home health aide called 911. Pt initially presented to the ED completely aphasic w/ only moaning and no intelligible speech, and R facial droop, but moving all extremities. At the time of initial consult, the pt had been given Versed IVP for sedation due to agitation. After ED team spoke with the pt's daughter via phone, it was established that the patient had "difficulty speaking" and was confused for ~1 week and LKW was no longer clear. Pt admitted for stroke workup.    Admission NIHSS: 7    SUBJECTIVE: No events overnight.  No new neurologic complaints.  ROS reported negative unless otherwise noted.    acetaminophen     Tablet .. 650 milliGRAM(s) Oral every 6 hours PRN  amLODIPine   Tablet 5 milliGRAM(s) Oral daily  ARIPiprazole 30 milliGRAM(s) Oral daily  aspirin enteric coated 81 milliGRAM(s) Oral daily  atorvastatin 80 milliGRAM(s) Oral at bedtime  clopidogrel Tablet 75 milliGRAM(s) Oral daily  dextrose 5%. 1000 milliLiter(s) IV Continuous <Continuous>  dextrose 5%. 1000 milliLiter(s) IV Continuous <Continuous>  dextrose 50% Injectable 25 Gram(s) IV Push once  dextrose 50% Injectable 12.5 Gram(s) IV Push once  dextrose 50% Injectable 25 Gram(s) IV Push once  dextrose Oral Gel 15 Gram(s) Oral once PRN  enoxaparin Injectable 40 milliGRAM(s) SubCutaneous every 24 hours  glucagon  Injectable 1 milliGRAM(s) IntraMuscular once  insulin lispro (ADMELOG) corrective regimen sliding scale   SubCutaneous three times a day before meals  levothyroxine 25 MICROGram(s) Oral daily  magnesium sulfate  IVPB 2 Gram(s) IV Intermittent once  perphenazine 8 milliGRAM(s) Oral at bedtime  sodium chloride 0.9%. 1000 milliLiter(s) IV Continuous <Continuous>      PHYSICAL EXAM:   Vital Signs Last 24 Hrs  T(C): 36.4 (16 Jan 2024 09:09), Max: 36.6 (15 Unruly 2024 12:12)  T(F): 97.5 (16 Jan 2024 09:09), Max: 97.9 (15 Unruly 2024 12:12)  HR: 80 (16 Jan 2024 09:09) (69 - 81)  BP: 127/68 (16 Jan 2024 09:09) (126/59 - 137/62)  BP(mean): 88 (16 Jan 2024 09:09) (88 - 88)  RR: 18 (16 Jan 2024 09:09) (16 - 18)  SpO2: 97% (16 Jan 2024 09:09) (96% - 98%)    Parameters below as of 16 Jan 2024 09:09  Patient On (Oxygen Delivery Method): room air    EXAM PENDING     General: NAD , left nasal ecchymosis     Neurologic Exam:    Mental status: The patient upon arrival was sitting up in bed, alert and oriented to self, time, place, and situation. The patient is able to name objects, follow commands, repeat sentences.    Cranial nerves: Mild dysarthria still present. Pupils equal and react symmetrically to light. Attends to movement all visual fields. Extraocular motion is full with no nystagmus. There is no ptosis. Facial sensation is intact. Facial symmetry intact.    Motor: There is normal bulk and tone.  There is no tremor.  Strength is 5/5 in the right arm.  Strength is 5/5 in the right leg.  Strength is 5/5 in the left arm.  Strength is 5/5 in the left leg.    Sensation: Intact to light touch and pin in 4 extremities    Cerebellar: There is no gross dysmetria appreciated     Gait : deferred    LABS:       IMAGING: Reviewed by me.      MR Head No Cont (01.16.24 @ 04:28)   No evidence for intracranial mass, acute territorial infarct, acute   intracranial hemorrhage, or midline shift.    EEG Classification / Summary: (01.12.24 @ 10:59)  Normal  EEG in the awake, drowsy, asleep state(s).    Clinical Impression:  There were no epileptiform abnormalities recorded.      TTE W or WO Ultrasound Enhancing Agent (01.11.24 @ 17:24)  CONCLUSIONS:     1. Left ventricular cavity is normal. Left ventricular wall thickness is normal. Left ventricular systolic function is hyperdynamic with an ejection fraction visually estimated at 70 to 75 %.   2. There is mild (grade 1) left ventricular diastolic dysfunction, with elevated filling pressure.   3. Normal right ventricular cavity size and normal systolic function.   4. The left atrium is normal.   5. The right atrium is normal in size.   6. There is moderate calcification of the mitral valve annulus.   7. Mild mitral valve leaflet calcification.   8. No mitral regurgitation.   9. Fibrocalcific aortic valve sclerosis without stenosis.  10. Trace tricuspid regurgitation.  11. No pericardial effusion seen.  12. Agitated saline injection was negative for intracardiac shunt.    CT Brain, CTA Brain, CTA Neck, CT Brain Perfusion Stroke Protocol w/ IV Cont (01.11.24 @ 10:03)  IMPRESSION:  CT BRAIN  1. No evidence of acute hemorrhage, territorial infarct or vasogenic   edema.  2. Additional findings described in detail above.    CT PERFUSION  1. No predicted core infarct.  2. No evidence of active ischemia-tissue at risk.    CTA  1. Vertebral arteries patent, with right-sided dominance.  2. No evidence of significant stenosis, occlusion or dissection bilateral   extracranial carotid arteries.  3. No evidence of large vessel occlusion, definitive aneurysm or vascular   malformation intracranial circulation. Additional findings, including   anatomic variants, described above.  4. Additional findings described in detail above.     Preliminary note, offical recommendations pending attending review/signature   [facility] Stroke Team  Progress Note     HPI:  72 y/o F w/ PMH of bipolar dx vs schizo affective disorder, HTN, HLD, DMII who presented s/p witnessed fall with garbled speech and altered mental status. Per EMS, the pt was making breakfast at 0850 1/11/24   when she suddenly became unable to speak, fell down,, and the pt's home health aide called 911. Pt initially presented to the ED completely aphasic w/ only moaning and no intelligible speech, and R facial droop, but moving all extremities. At the time of initial consult, the pt had been given Versed IVP for sedation due to agitation. After ED team spoke with the pt's daughter via phone, it was established that the patient had "difficulty speaking" and was confused for ~1 week and LKW was no longer clear. Pt admitted for stroke workup.    Admission NIHSS: 7    SUBJECTIVE: No events overnight.  No new neurologic complaints.  ROS reported negative unless otherwise noted.    acetaminophen     Tablet .. 650 milliGRAM(s) Oral every 6 hours PRN  amLODIPine   Tablet 5 milliGRAM(s) Oral daily  ARIPiprazole 30 milliGRAM(s) Oral daily  aspirin enteric coated 81 milliGRAM(s) Oral daily  atorvastatin 80 milliGRAM(s) Oral at bedtime  clopidogrel Tablet 75 milliGRAM(s) Oral daily  dextrose 5%. 1000 milliLiter(s) IV Continuous <Continuous>  dextrose 5%. 1000 milliLiter(s) IV Continuous <Continuous>  dextrose 50% Injectable 25 Gram(s) IV Push once  dextrose 50% Injectable 12.5 Gram(s) IV Push once  dextrose 50% Injectable 25 Gram(s) IV Push once  dextrose Oral Gel 15 Gram(s) Oral once PRN  enoxaparin Injectable 40 milliGRAM(s) SubCutaneous every 24 hours  glucagon  Injectable 1 milliGRAM(s) IntraMuscular once  insulin lispro (ADMELOG) corrective regimen sliding scale   SubCutaneous three times a day before meals  levothyroxine 25 MICROGram(s) Oral daily  magnesium sulfate  IVPB 2 Gram(s) IV Intermittent once  perphenazine 8 milliGRAM(s) Oral at bedtime  sodium chloride 0.9%. 1000 milliLiter(s) IV Continuous <Continuous>      PHYSICAL EXAM:   Vital Signs Last 24 Hrs  T(C): 36.4 (16 Jan 2024 09:09), Max: 36.6 (15 Unruly 2024 12:12)  T(F): 97.5 (16 Jan 2024 09:09), Max: 97.9 (15 Unruly 2024 12:12)  HR: 80 (16 Jan 2024 09:09) (69 - 81)  BP: 127/68 (16 Jan 2024 09:09) (126/59 - 137/62)  BP(mean): 88 (16 Jan 2024 09:09) (88 - 88)  RR: 18 (16 Jan 2024 09:09) (16 - 18)  SpO2: 97% (16 Jan 2024 09:09) (96% - 98%)    Parameters below as of 16 Jan 2024 09:09  Patient On (Oxygen Delivery Method): room air        General: NAD , left nasal ecchymosis     Neurologic Exam:    Mental status: The patient upon arrival was sitting up in bed, alert and oriented to self, time, place, and situation. The patient is able to name objects, follow commands, repeat sentences.    Cranial nerves: Mild dysarthria improved. Pupils equal and react symmetrically to light. Attends to movement all visual fields. Extraocular motion is full with no nystagmus. There is no ptosis. Facial sensation is intact. Facial symmetry intact.    Motor: There is normal bulk and tone.  There is no tremor.  Strength is 5/5 in the right arm.  Strength is 5/5 in the right leg.  Strength is 5/5 in the left arm.  Strength is 5/5 in the left leg.    Sensation: Intact to light touch and pin in 4 extremities    Cerebellar: There is no gross dysmetria appreciated     Gait : deferred    LABS:       IMAGING: Reviewed by me.     MR Head No Cont (01.16.24 @ 04:28)   No evidence for intracranial mass, acute territorial infarct, acute   intracranial hemorrhage, or midline shift.    EEG Classification / Summary: (01.12.24 @ 10:59)  Normal  EEG in the awake, drowsy, asleep state(s).    Clinical Impression:  There were no epileptiform abnormalities recorded.      TTE W or WO Ultrasound Enhancing Agent (01.11.24 @ 17:24)  CONCLUSIONS:     1. Left ventricular cavity is normal. Left ventricular wall thickness is normal. Left ventricular systolic function is hyperdynamic with an ejection fraction visually estimated at 70 to 75 %.   2. There is mild (grade 1) left ventricular diastolic dysfunction, with elevated filling pressure.   3. Normal right ventricular cavity size and normal systolic function.   4. The left atrium is normal.   5. The right atrium is normal in size.   6. There is moderate calcification of the mitral valve annulus.   7. Mild mitral valve leaflet calcification.   8. No mitral regurgitation.   9. Fibrocalcific aortic valve sclerosis without stenosis.  10. Trace tricuspid regurgitation.  11. No pericardial effusion seen.  12. Agitated saline injection was negative for intracardiac shunt.    CT Brain, CTA Brain, CTA Neck, CT Brain Perfusion Stroke Protocol w/ IV Cont (01.11.24 @ 10:03)  IMPRESSION:  CT BRAIN  1. No evidence of acute hemorrhage, territorial infarct or vasogenic   edema.  2. Additional findings described in detail above.    CT PERFUSION  1. No predicted core infarct.  2. No evidence of active ischemia-tissue at risk.    CTA  1. Vertebral arteries patent, with right-sided dominance.  2. No evidence of significant stenosis, occlusion or dissection bilateral   extracranial carotid arteries.  3. No evidence of large vessel occlusion, definitive aneurysm or vascular   malformation intracranial circulation. Additional findings, including   anatomic variants, described above.  4. Additional findings described in detail above.     Preliminary note, offical recommendations pending attending review/signature   [facility] Stroke Team  Progress Note     HPI:  72 y/o F w/ PMH of bipolar dx vs schizo affective disorder, HTN, HLD, DMII who presented s/p witnessed fall with garbled speech and altered mental status. Per EMS, the pt was making breakfast at 0850 1/11/24   when she suddenly became unable to speak, fell down,, and the pt's home health aide called 911. Pt initially presented to the ED completely aphasic w/ only moaning and no intelligible speech, and R facial droop, but moving all extremities. At the time of initial consult, the pt had been given Versed IVP for sedation due to agitation. After ED team spoke with the pt's daughter via phone, it was established that the patient had "difficulty speaking" and was confused for ~1 week and LKW was no longer clear. Pt admitted for stroke workup.    Admission NIHSS: 7    SUBJECTIVE: No events overnight.  No new neurologic complaints.  ROS reported negative unless otherwise noted.    acetaminophen     Tablet .. 650 milliGRAM(s) Oral every 6 hours PRN  amLODIPine   Tablet 5 milliGRAM(s) Oral daily  ARIPiprazole 30 milliGRAM(s) Oral daily  aspirin enteric coated 81 milliGRAM(s) Oral daily  atorvastatin 80 milliGRAM(s) Oral at bedtime  clopidogrel Tablet 75 milliGRAM(s) Oral daily  dextrose 5%. 1000 milliLiter(s) IV Continuous <Continuous>  dextrose 5%. 1000 milliLiter(s) IV Continuous <Continuous>  dextrose 50% Injectable 25 Gram(s) IV Push once  dextrose 50% Injectable 12.5 Gram(s) IV Push once  dextrose 50% Injectable 25 Gram(s) IV Push once  dextrose Oral Gel 15 Gram(s) Oral once PRN  enoxaparin Injectable 40 milliGRAM(s) SubCutaneous every 24 hours  glucagon  Injectable 1 milliGRAM(s) IntraMuscular once  insulin lispro (ADMELOG) corrective regimen sliding scale   SubCutaneous three times a day before meals  levothyroxine 25 MICROGram(s) Oral daily  magnesium sulfate  IVPB 2 Gram(s) IV Intermittent once  perphenazine 8 milliGRAM(s) Oral at bedtime  sodium chloride 0.9%. 1000 milliLiter(s) IV Continuous <Continuous>      PHYSICAL EXAM:   Vital Signs Last 24 Hrs  T(C): 36.4 (16 Jan 2024 09:09), Max: 36.6 (15 Unruly 2024 12:12)  T(F): 97.5 (16 Jan 2024 09:09), Max: 97.9 (15 Unruly 2024 12:12)  HR: 80 (16 Jan 2024 09:09) (69 - 81)  BP: 127/68 (16 Jan 2024 09:09) (126/59 - 137/62)  BP(mean): 88 (16 Jan 2024 09:09) (88 - 88)  RR: 18 (16 Jan 2024 09:09) (16 - 18)  SpO2: 97% (16 Jan 2024 09:09) (96% - 98%)    Parameters below as of 16 Jan 2024 09:09  Patient On (Oxygen Delivery Method): room air        General: NAD , left nasal ecchymosis     Neurologic Exam:    Mental status: The patient upon arrival was sitting up in bed, alert and oriented to self, time, place, and situation. The patient is able to name objects, follow commands, repeat sentences.    Cranial nerves: Mild dysarthria improved. Pupils equal and react symmetrically to light. Attends to movement all visual fields. Extraocular motion is full with no nystagmus. There is no ptosis. Facial sensation is intact. Facial symmetry intact.    Motor: There is normal bulk and tone.  There is no tremor.  Strength is 5/5 in the right arm.  Strength is 5/5 in the right leg.  Strength is 5/5 in the left arm.  Strength is 5/5 in the left leg.    Sensation: Intact to light touch and pin in 4 extremities    Cerebellar: There is no gross dysmetria appreciated     Gait : deferred    LABS:       IMAGING: Reviewed by me.     MR Head No Cont (01.16.24 @ 04:28)   No evidence for intracranial mass, acute territorial infarct, acute   intracranial hemorrhage, or midline shift.    EEG Classification / Summary: (01.12.24 @ 10:59)  Normal  EEG in the awake, drowsy, asleep state(s).    Clinical Impression:  There were no epileptiform abnormalities recorded.      TTE W or WO Ultrasound Enhancing Agent (01.11.24 @ 17:24)  CONCLUSIONS:     1. Left ventricular cavity is normal. Left ventricular wall thickness is normal. Left ventricular systolic function is hyperdynamic with an ejection fraction visually estimated at 70 to 75 %.   2. There is mild (grade 1) left ventricular diastolic dysfunction, with elevated filling pressure.   3. Normal right ventricular cavity size and normal systolic function.   4. The left atrium is normal.   5. The right atrium is normal in size.   6. There is moderate calcification of the mitral valve annulus.   7. Mild mitral valve leaflet calcification.   8. No mitral regurgitation.   9. Fibrocalcific aortic valve sclerosis without stenosis.  10. Trace tricuspid regurgitation.  11. No pericardial effusion seen.  12. Agitated saline injection was negative for intracardiac shunt.    CT Brain, CTA Brain, CTA Neck, CT Brain Perfusion Stroke Protocol w/ IV Cont (01.11.24 @ 10:03)  IMPRESSION:  CT BRAIN  1. No evidence of acute hemorrhage, territorial infarct or vasogenic   edema.  2. Additional findings described in detail above.    CT PERFUSION  1. No predicted core infarct.  2. No evidence of active ischemia-tissue at risk.    CTA  1. Vertebral arteries patent, with right-sided dominance.  2. No evidence of significant stenosis, occlusion or dissection bilateral   extracranial carotid arteries.  3. No evidence of large vessel occlusion, definitive aneurysm or vascular   malformation intracranial circulation. Additional findings, including   anatomic variants, described above.  4. Additional findings described in detail above.       [facility] Stroke Team  Progress Note     HPI:  72 y/o F w/ PMH of bipolar dx vs schizo affective disorder, HTN, HLD, DMII who presented s/p witnessed fall with garbled speech and altered mental status. Per EMS, the pt was making breakfast at 0850 1/11/24   when she suddenly became unable to speak, fell down,, and the pt's home health aide called 911. Pt initially presented to the ED completely aphasic w/ only moaning and no intelligible speech, and R facial droop, but moving all extremities. At the time of initial consult, the pt had been given Versed IVP for sedation due to agitation. After ED team spoke with the pt's daughter via phone, it was established that the patient had "difficulty speaking" and was confused for ~1 week and LKW was no longer clear. Pt admitted for stroke workup.    Admission NIHSS: 7    SUBJECTIVE: No events overnight.  No new neurologic complaints.  ROS reported negative unless otherwise noted.    acetaminophen     Tablet .. 650 milliGRAM(s) Oral every 6 hours PRN  amLODIPine   Tablet 5 milliGRAM(s) Oral daily  ARIPiprazole 30 milliGRAM(s) Oral daily  aspirin enteric coated 81 milliGRAM(s) Oral daily  atorvastatin 80 milliGRAM(s) Oral at bedtime  clopidogrel Tablet 75 milliGRAM(s) Oral daily  dextrose 5%. 1000 milliLiter(s) IV Continuous <Continuous>  dextrose 5%. 1000 milliLiter(s) IV Continuous <Continuous>  dextrose 50% Injectable 25 Gram(s) IV Push once  dextrose 50% Injectable 12.5 Gram(s) IV Push once  dextrose 50% Injectable 25 Gram(s) IV Push once  dextrose Oral Gel 15 Gram(s) Oral once PRN  enoxaparin Injectable 40 milliGRAM(s) SubCutaneous every 24 hours  glucagon  Injectable 1 milliGRAM(s) IntraMuscular once  insulin lispro (ADMELOG) corrective regimen sliding scale   SubCutaneous three times a day before meals  levothyroxine 25 MICROGram(s) Oral daily  magnesium sulfate  IVPB 2 Gram(s) IV Intermittent once  perphenazine 8 milliGRAM(s) Oral at bedtime  sodium chloride 0.9%. 1000 milliLiter(s) IV Continuous <Continuous>      PHYSICAL EXAM:   Vital Signs Last 24 Hrs  T(C): 36.4 (16 Jan 2024 09:09), Max: 36.6 (15 Unruly 2024 12:12)  T(F): 97.5 (16 Jan 2024 09:09), Max: 97.9 (15 Unruly 2024 12:12)  HR: 80 (16 Jan 2024 09:09) (69 - 81)  BP: 127/68 (16 Jan 2024 09:09) (126/59 - 137/62)  BP(mean): 88 (16 Jan 2024 09:09) (88 - 88)  RR: 18 (16 Jan 2024 09:09) (16 - 18)  SpO2: 97% (16 Jan 2024 09:09) (96% - 98%)    Parameters below as of 16 Jan 2024 09:09  Patient On (Oxygen Delivery Method): room air        General: NAD , left nasal ecchymosis     Neurologic Exam:    Mental status: The patient upon arrival was sitting up in bed, alert and oriented to self, time, place, and situation. The patient is able to name objects, follow commands, repeat sentences.    Cranial nerves: Mild dysarthria improved. Pupils equal and react symmetrically to light. Attends to movement all visual fields. Extraocular motion is full with no nystagmus. There is no ptosis. Facial sensation is intact. Facial symmetry intact.    Motor: There is normal bulk and tone.  There is no tremor.  Strength is 5/5 in the right arm.  Strength is 5/5 in the right leg.  Strength is 5/5 in the left arm.  Strength is 5/5 in the left leg.    Sensation: Intact to light touch and pin in 4 extremities    Cerebellar: There is no gross dysmetria appreciated     Gait : deferred    LABS:       IMAGING: Reviewed by me.     MR Head No Cont (01.16.24 @ 04:28)   No evidence for intracranial mass, acute territorial infarct, acute   intracranial hemorrhage, or midline shift.    EEG Classification / Summary: (01.12.24 @ 10:59)  Normal  EEG in the awake, drowsy, asleep state(s).    Clinical Impression:  There were no epileptiform abnormalities recorded.      TTE W or WO Ultrasound Enhancing Agent (01.11.24 @ 17:24)  CONCLUSIONS:     1. Left ventricular cavity is normal. Left ventricular wall thickness is normal. Left ventricular systolic function is hyperdynamic with an ejection fraction visually estimated at 70 to 75 %.   2. There is mild (grade 1) left ventricular diastolic dysfunction, with elevated filling pressure.   3. Normal right ventricular cavity size and normal systolic function.   4. The left atrium is normal.   5. The right atrium is normal in size.   6. There is moderate calcification of the mitral valve annulus.   7. Mild mitral valve leaflet calcification.   8. No mitral regurgitation.   9. Fibrocalcific aortic valve sclerosis without stenosis.  10. Trace tricuspid regurgitation.  11. No pericardial effusion seen.  12. Agitated saline injection was negative for intracardiac shunt.    CT Brain, CTA Brain, CTA Neck, CT Brain Perfusion Stroke Protocol w/ IV Cont (01.11.24 @ 10:03)  IMPRESSION:  CT BRAIN  1. No evidence of acute hemorrhage, territorial infarct or vasogenic   edema.  2. Additional findings described in detail above.    CT PERFUSION  1. No predicted core infarct.  2. No evidence of active ischemia-tissue at risk.    CTA  1. Vertebral arteries patent, with right-sided dominance.  2. No evidence of significant stenosis, occlusion or dissection bilateral   extracranial carotid arteries.  3. No evidence of large vessel occlusion, definitive aneurysm or vascular   malformation intracranial circulation. Additional findings, including   anatomic variants, described above.  4. Additional findings described in detail above.       [facility] Stroke Team  Progress Note     HPI:  72 y/o F w/ PMH of bipolar dx vs schizo affective disorder, HTN, HLD, DMII who presented s/p witnessed fall with garbled speech and altered mental status. Per EMS, the pt was making breakfast at 0850 1/11/24   when she suddenly became unable to speak, fell down,, and the pt's home health aide called 911. Pt initially presented to the ED completely aphasic w/ only moaning and no intelligible speech, and R facial droop, but moving all extremities. At the time of initial consult, the pt had been given Versed IVP for sedation due to agitation. After ED team spoke with the pt's daughter via phone, it was established that the patient had "difficulty speaking" and was confused for ~1 week and LKW was no longer clear. Pt admitted for stroke workup.    Admission NIHSS: 7    SUBJECTIVE: No events overnight.  No new neurologic complaints.  ROS reported negative unless otherwise noted.    acetaminophen     Tablet .. 650 milliGRAM(s) Oral every 6 hours PRN  amLODIPine   Tablet 5 milliGRAM(s) Oral daily  ARIPiprazole 30 milliGRAM(s) Oral daily  aspirin enteric coated 81 milliGRAM(s) Oral daily  atorvastatin 80 milliGRAM(s) Oral at bedtime  clopidogrel Tablet 75 milliGRAM(s) Oral daily  dextrose 5%. 1000 milliLiter(s) IV Continuous <Continuous>  dextrose 5%. 1000 milliLiter(s) IV Continuous <Continuous>  dextrose 50% Injectable 25 Gram(s) IV Push once  dextrose 50% Injectable 12.5 Gram(s) IV Push once  dextrose 50% Injectable 25 Gram(s) IV Push once  dextrose Oral Gel 15 Gram(s) Oral once PRN  enoxaparin Injectable 40 milliGRAM(s) SubCutaneous every 24 hours  glucagon  Injectable 1 milliGRAM(s) IntraMuscular once  insulin lispro (ADMELOG) corrective regimen sliding scale   SubCutaneous three times a day before meals  levothyroxine 25 MICROGram(s) Oral daily  magnesium sulfate  IVPB 2 Gram(s) IV Intermittent once  perphenazine 8 milliGRAM(s) Oral at bedtime  sodium chloride 0.9%. 1000 milliLiter(s) IV Continuous <Continuous>      PHYSICAL EXAM:   Vital Signs Last 24 Hrs  T(C): 36.4 (16 Jan 2024 09:09), Max: 36.6 (15 Unruly 2024 12:12)  T(F): 97.5 (16 Jan 2024 09:09), Max: 97.9 (15 Unruly 2024 12:12)  HR: 80 (16 Jan 2024 09:09) (69 - 81)  BP: 127/68 (16 Jan 2024 09:09) (126/59 - 137/62)  BP(mean): 88 (16 Jan 2024 09:09) (88 - 88)  RR: 18 (16 Jan 2024 09:09) (16 - 18)  SpO2: 97% (16 Jan 2024 09:09) (96% - 98%)    Parameters below as of 16 Jan 2024 09:09  Patient On (Oxygen Delivery Method): room air        General: NAD , left nasal ecchymosis     Neurologic Exam:    Mental status: The patient upon arrival was sitting up in bed, alert and oriented to self, time, place, and situation. The patient is able to name objects, follow commands, repeat sentences.    Cranial nerves: Mild dysarthria improved. Pupils equal and react symmetrically to light. Attends to movement all visual fields. Extraocular motion is full with no nystagmus. There is no ptosis. Facial sensation is intact. Facial symmetry intact.    Motor: There is normal bulk and tone.  There is no tremor.  Strength is 5/5 in the right arm.  Strength is 5/5 in the right leg.  Strength is 5/5 in the left arm.  Strength is 5/5 in the left leg.    Sensation: Intact to light touch and pin in 4 extremities    Cerebellar: There is no gross dysmetria appreciated     Gait : deferred    LABS:       IMAGING: Reviewed by me.     MR Head No Cont (01.16.24 @ 04:28)   No evidence for intracranial mass, acute territorial infarct, acute   intracranial hemorrhage, or midline shift.    EEG Classification / Summary: (01.12.24 @ 10:59)  Normal  EEG in the awake, drowsy, asleep state(s).    Clinical Impression:  There were no epileptiform abnormalities recorded.      TTE W or WO Ultrasound Enhancing Agent (01.11.24 @ 17:24)  CONCLUSIONS:     1. Left ventricular cavity is normal. Left ventricular wall thickness is normal. Left ventricular systolic function is hyperdynamic with an ejection fraction visually estimated at 70 to 75 %.   2. There is mild (grade 1) left ventricular diastolic dysfunction, with elevated filling pressure.   3. Normal right ventricular cavity size and normal systolic function.   4. The left atrium is normal.   5. The right atrium is normal in size.   6. There is moderate calcification of the mitral valve annulus.   7. Mild mitral valve leaflet calcification.   8. No mitral regurgitation.   9. Fibrocalcific aortic valve sclerosis without stenosis.  10. Trace tricuspid regurgitation.  11. No pericardial effusion seen.  12. Agitated saline injection was negative for intracardiac shunt.    CT Brain, CTA Brain, CTA Neck, CT Brain Perfusion Stroke Protocol w/ IV Cont (01.11.24 @ 10:03)  IMPRESSION:  CT BRAIN  1. No evidence of acute hemorrhage, territorial infarct or vasogenic   edema.  2. Additional findings described in detail above.    CT PERFUSION  1. No predicted core infarct.  2. No evidence of active ischemia-tissue at risk.    CTA  1. Vertebral arteries patent, with right-sided dominance.  2. No evidence of significant stenosis, occlusion or dissection bilateral   extracranial carotid arteries.  3. No evidence of large vessel occlusion, definitive aneurysm or vascular   malformation intracranial circulation. Additional findings, including   anatomic variants, described above.  4. Additional findings described in detail above.

## 2024-02-26 ENCOUNTER — EMERGENCY (EMERGENCY)
Facility: HOSPITAL | Age: 74
LOS: 1 days | Discharge: HOME | End: 2024-02-26
Attending: STUDENT IN AN ORGANIZED HEALTH CARE EDUCATION/TRAINING PROGRAM
Payer: MEDICARE

## 2024-02-26 VITALS
OXYGEN SATURATION: 95 % | TEMPERATURE: 98 F | DIASTOLIC BLOOD PRESSURE: 61 MMHG | RESPIRATION RATE: 17 BRPM | HEART RATE: 122 BPM | SYSTOLIC BLOOD PRESSURE: 120 MMHG

## 2024-02-26 PROBLEM — E11.9 TYPE 2 DIABETES MELLITUS WITHOUT COMPLICATIONS: Chronic | Status: ACTIVE | Noted: 2024-01-11

## 2024-02-26 PROBLEM — E78.5 HYPERLIPIDEMIA, UNSPECIFIED: Chronic | Status: ACTIVE | Noted: 2024-01-11

## 2024-02-26 PROBLEM — F31.9 BIPOLAR DISORDER, UNSPECIFIED: Chronic | Status: ACTIVE | Noted: 2024-01-11

## 2024-02-26 PROBLEM — I10 ESSENTIAL (PRIMARY) HYPERTENSION: Chronic | Status: ACTIVE | Noted: 2024-01-11

## 2024-02-26 LAB
ALBUMIN SERPL ELPH-MCNC: 4 G/DL — SIGNIFICANT CHANGE UP (ref 3.3–5.2)
ALP SERPL-CCNC: 93 U/L — SIGNIFICANT CHANGE UP (ref 40–120)
ALT FLD-CCNC: 11 U/L — SIGNIFICANT CHANGE UP
AMPHET UR-MCNC: NEGATIVE — SIGNIFICANT CHANGE UP
ANION GAP SERPL CALC-SCNC: 15 MMOL/L — SIGNIFICANT CHANGE UP (ref 5–17)
ANISOCYTOSIS BLD QL: SLIGHT — SIGNIFICANT CHANGE UP
APPEARANCE UR: CLEAR — SIGNIFICANT CHANGE UP
AST SERPL-CCNC: 14 U/L — SIGNIFICANT CHANGE UP
BARBITURATES UR SCN-MCNC: NEGATIVE — SIGNIFICANT CHANGE UP
BASOPHILS # BLD AUTO: 0.01 K/UL — SIGNIFICANT CHANGE UP (ref 0–0.2)
BASOPHILS NFR BLD AUTO: 0.2 % — SIGNIFICANT CHANGE UP (ref 0–2)
BENZODIAZ UR-MCNC: NEGATIVE — SIGNIFICANT CHANGE UP
BILIRUB SERPL-MCNC: 0.3 MG/DL — LOW (ref 0.4–2)
BILIRUB UR-MCNC: NEGATIVE — SIGNIFICANT CHANGE UP
BUN SERPL-MCNC: 10.7 MG/DL — SIGNIFICANT CHANGE UP (ref 8–20)
CALCIUM SERPL-MCNC: 9.5 MG/DL — SIGNIFICANT CHANGE UP (ref 8.4–10.5)
CHLORIDE SERPL-SCNC: 100 MMOL/L — SIGNIFICANT CHANGE UP (ref 96–108)
CO2 SERPL-SCNC: 23 MMOL/L — SIGNIFICANT CHANGE UP (ref 22–29)
COCAINE METAB.OTHER UR-MCNC: NEGATIVE — SIGNIFICANT CHANGE UP
COLOR SPEC: YELLOW — SIGNIFICANT CHANGE UP
CREAT SERPL-MCNC: 0.47 MG/DL — LOW (ref 0.5–1.3)
DIFF PNL FLD: NEGATIVE — SIGNIFICANT CHANGE UP
EGFR: 100 ML/MIN/1.73M2 — SIGNIFICANT CHANGE UP
EOSINOPHIL # BLD AUTO: 0 K/UL — SIGNIFICANT CHANGE UP (ref 0–0.5)
EOSINOPHIL NFR BLD AUTO: 0 % — SIGNIFICANT CHANGE UP (ref 0–6)
ETHANOL SERPL-MCNC: <10 MG/DL — SIGNIFICANT CHANGE UP (ref 0–9)
FLUAV AG NPH QL: SIGNIFICANT CHANGE UP
FLUBV AG NPH QL: SIGNIFICANT CHANGE UP
GLUCOSE SERPL-MCNC: 97 MG/DL — SIGNIFICANT CHANGE UP (ref 70–99)
GLUCOSE UR QL: >=1000 MG/DL
HCT VFR BLD CALC: 31.4 % — LOW (ref 34.5–45)
HGB BLD-MCNC: 9.5 G/DL — LOW (ref 11.5–15.5)
HYPOCHROMIA BLD QL: SLIGHT — SIGNIFICANT CHANGE UP
IMM GRANULOCYTES NFR BLD AUTO: 0.5 % — SIGNIFICANT CHANGE UP (ref 0–0.9)
KETONES UR-MCNC: 40 MG/DL
LEUKOCYTE ESTERASE UR-ACNC: NEGATIVE — SIGNIFICANT CHANGE UP
LYMPHOCYTES # BLD AUTO: 0.56 K/UL — LOW (ref 1–3.3)
LYMPHOCYTES # BLD AUTO: 9.6 % — LOW (ref 13–44)
MANUAL SMEAR VERIFICATION: SIGNIFICANT CHANGE UP
MCHC RBC-ENTMCNC: 23.7 PG — LOW (ref 27–34)
MCHC RBC-ENTMCNC: 30.3 GM/DL — LOW (ref 32–36)
MCV RBC AUTO: 78.3 FL — LOW (ref 80–100)
METHADONE UR-MCNC: NEGATIVE — SIGNIFICANT CHANGE UP
MICROCYTES BLD QL: SLIGHT — SIGNIFICANT CHANGE UP
MONOCYTES # BLD AUTO: 0.13 K/UL — SIGNIFICANT CHANGE UP (ref 0–0.9)
MONOCYTES NFR BLD AUTO: 2.2 % — SIGNIFICANT CHANGE UP (ref 2–14)
NEUTROPHILS # BLD AUTO: 5.08 K/UL — SIGNIFICANT CHANGE UP (ref 1.8–7.4)
NEUTROPHILS NFR BLD AUTO: 87.5 % — HIGH (ref 43–77)
NITRITE UR-MCNC: NEGATIVE — SIGNIFICANT CHANGE UP
OPIATES UR-MCNC: NEGATIVE — SIGNIFICANT CHANGE UP
PCP SPEC-MCNC: SIGNIFICANT CHANGE UP
PCP UR-MCNC: NEGATIVE — SIGNIFICANT CHANGE UP
PH UR: 5.5 — SIGNIFICANT CHANGE UP (ref 5–8)
PLAT MORPH BLD: NORMAL — SIGNIFICANT CHANGE UP
PLATELET # BLD AUTO: 256 K/UL — SIGNIFICANT CHANGE UP (ref 150–400)
POIKILOCYTOSIS BLD QL AUTO: SLIGHT — SIGNIFICANT CHANGE UP
POLYCHROMASIA BLD QL SMEAR: SLIGHT — SIGNIFICANT CHANGE UP
POTASSIUM SERPL-MCNC: 3.9 MMOL/L — SIGNIFICANT CHANGE UP (ref 3.5–5.3)
POTASSIUM SERPL-SCNC: 3.9 MMOL/L — SIGNIFICANT CHANGE UP (ref 3.5–5.3)
PROT SERPL-MCNC: 6.2 G/DL — LOW (ref 6.6–8.7)
PROT UR-MCNC: NEGATIVE MG/DL — SIGNIFICANT CHANGE UP
RBC # BLD: 4.01 M/UL — SIGNIFICANT CHANGE UP (ref 3.8–5.2)
RBC # FLD: 22.4 % — HIGH (ref 10.3–14.5)
RBC BLD AUTO: ABNORMAL
RSV RNA NPH QL NAA+NON-PROBE: SIGNIFICANT CHANGE UP
SARS-COV-2 RNA SPEC QL NAA+PROBE: SIGNIFICANT CHANGE UP
SCHISTOCYTES BLD QL AUTO: SLIGHT — SIGNIFICANT CHANGE UP
SODIUM SERPL-SCNC: 138 MMOL/L — SIGNIFICANT CHANGE UP (ref 135–145)
SP GR SPEC: 1.03 — SIGNIFICANT CHANGE UP (ref 1–1.03)
THC UR QL: POSITIVE
TROPONIN T, HIGH SENSITIVITY RESULT: 17 NG/L — SIGNIFICANT CHANGE UP (ref 0–51)
UROBILINOGEN FLD QL: 0.2 MG/DL — SIGNIFICANT CHANGE UP (ref 0.2–1)
WBC # BLD: 5.81 K/UL — SIGNIFICANT CHANGE UP (ref 3.8–10.5)
WBC # FLD AUTO: 5.81 K/UL — SIGNIFICANT CHANGE UP (ref 3.8–10.5)

## 2024-02-26 PROCEDURE — 71045 X-RAY EXAM CHEST 1 VIEW: CPT | Mod: 26

## 2024-02-26 PROCEDURE — 36415 COLL VENOUS BLD VENIPUNCTURE: CPT

## 2024-02-26 PROCEDURE — 80307 DRUG TEST PRSMV CHEM ANLYZR: CPT

## 2024-02-26 PROCEDURE — 81003 URINALYSIS AUTO W/O SCOPE: CPT

## 2024-02-26 PROCEDURE — 87637 SARSCOV2&INF A&B&RSV AMP PRB: CPT

## 2024-02-26 PROCEDURE — 87086 URINE CULTURE/COLONY COUNT: CPT

## 2024-02-26 PROCEDURE — 71045 X-RAY EXAM CHEST 1 VIEW: CPT

## 2024-02-26 PROCEDURE — 70450 CT HEAD/BRAIN W/O DYE: CPT | Mod: MC

## 2024-02-26 PROCEDURE — 93010 ELECTROCARDIOGRAM REPORT: CPT

## 2024-02-26 PROCEDURE — 99285 EMERGENCY DEPT VISIT HI MDM: CPT | Mod: 25

## 2024-02-26 PROCEDURE — 93005 ELECTROCARDIOGRAM TRACING: CPT

## 2024-02-26 PROCEDURE — 80053 COMPREHEN METABOLIC PANEL: CPT

## 2024-02-26 PROCEDURE — 82962 GLUCOSE BLOOD TEST: CPT

## 2024-02-26 PROCEDURE — 85025 COMPLETE CBC W/AUTO DIFF WBC: CPT

## 2024-02-26 PROCEDURE — 99284 EMERGENCY DEPT VISIT MOD MDM: CPT

## 2024-02-26 PROCEDURE — 84484 ASSAY OF TROPONIN QUANT: CPT

## 2024-02-26 PROCEDURE — 70450 CT HEAD/BRAIN W/O DYE: CPT | Mod: 26,MC

## 2024-02-26 NOTE — ED ADULT NURSE NOTE - OBJECTIVE STATEMENT
Pt is AxOx2 (not year), pt was biba for a fall. Pt is confused and is a poor historian. Breathing is even and unlabored. Pt denies pain.

## 2024-02-26 NOTE — ED PROVIDER NOTE - OBJECTIVE STATEMENT
73-year-old female with past medical history hypertension, hyperlipidemia, diabetes presents for altered mental status after having witnessed seizure-like activity while shopping in a store.  Patient was reportedly lowered to the floor by the  who called EMS.  Patient was vomiting prior to arrival.  She states she feels tired, unsure what happened, does not know where she is.  She states she lives with Niles,  and that it is okay to call him, however the only phone number in the chart is her daughter Timothy, who I did call and left a message for.  Patient denies any headache, chest pain, cough, shortness of breath, abdominal pain, nausea or vomiting.  There is dried vomitus on patient's stretcher and emesis bag (which is empty) on the floor.

## 2024-02-26 NOTE — ED PROVIDER NOTE - PROGRESS NOTE DETAILS
Khadijah: Patient's neighbor (Keith Coley [phone]) arrives to get patient's belongings and gives some more history. Patient was at a card store today, drove there, then felt dizzy and may have passed out. When asked the patient who the neighbor was, she said her father-in-law and gave the wrong name. Keith said this is not her baseline. He last saw her 2 days ago and she was normal. He was able to give me patient's significant other's phone number and I will call him. Khadijah: I called Akil Cho [phone] for collateral information. He did not answer and I left a message. Shemar: Pt received in sign out from Corwin Lucio and Deonte. Urine drug screen positive for THC, pt states she does take edibles on a regular basis. Coupled with thorough evaluation that was negative for seizure, stroke, and mass one month prior and repeat head CT that was negative, as well as pt currently being alert and oriented x 3 with coherent speech and ability to provide correct contact info for her partner, it is likely that this was secondary to marijuana use. Family agreeable to picking her up. Khadijah: Akil called back, spoke with nurse, states that "this happens" where she sleeps, wakes up, and is better. I tried to call him again, but again it went straight to voicemail.

## 2024-02-26 NOTE — ED PROVIDER NOTE - NSFOLLOWUPINSTRUCTIONS_ED_ALL_ED_FT
- Follow up with primary care provider and neurologist.  - Stay hydrated.  - Return to the ER if there are any concerns.

## 2024-02-26 NOTE — ED PROVIDER NOTE - PHYSICAL EXAMINATION
Const: Awake, alert and oriented x 1. In no acute distress. Well appearing.  HEENT: NC/AT. Moist mucous membranes.   No obvious tongue lacerations.  Eyes: No scleral icterus. EOMI.  Neck:. Soft and supple. Full ROM without pain.  Cardiac: Regular rate and regular rhythm. +S1/S2. No murmurs. Peripheral pulses 2+ and symmetric. No LE edema.  Resp: Speaking in full sentences. No evidence of respiratory distress. No wheezes, rales or rhonchi.  Abd: Soft, non-tender, non-distended. Normal bowel sounds in all 4 quadrants. No guarding or rebound.  Back: Spine midline and non-tender. No CVAT.  Skin: No rashes, abrasions or lacerations.  Neuro: Awake, alert & oriented x 1. Moves all extremities symmetrically.

## 2024-02-26 NOTE — ED PROVIDER NOTE - CLINICAL SUMMARY MEDICAL DECISION MAKING FREE TEXT BOX
73-year-old female presents after possible seizure, altered, vomited prior to arrival.  Patient unsure what happened, daughter called, but no answer and I left a message.  Will CT head, check labs, reassess.  Will obtain collateral information when it becomes available.  Prior hospitalization in January shows that patient was confused for about a week, admitted for stroke workup vs seizure, and was discharged on aspirin/Plavix and told to follow-up with neurology.  Patient's diagnosis at that time was metabolic encephalopathy, however after reading notes, it seems that patient remained very confused, and this may be her baseline. 73-year-old female presents after possible seizure, altered, vomited prior to arrival.  Patient unsure what happened, daughter called, but no answer and I left a message.  Will CT head, check labs, reassess.  Will obtain collateral information when it becomes available.  Prior hospitalization in January shows that patient was confused for about a week, admitted for stroke workup vs seizure, and was discharged on aspirin/Plavix and told to follow-up with neurology.  Patient's diagnosis at that time was metabolic encephalopathy, however after reading notes, it seems that patient remained very confused, and this may be her baseline.    Progress: This is not patient's baseline mental status - neighbor gives history that patient may have syncopized - will check EKG and troponin - evaluate for infectious etiology as well - will require admission.

## 2024-02-26 NOTE — ED PROVIDER NOTE - PATIENT PORTAL LINK FT
You can access the FollowMyHealth Patient Portal offered by [facility] by registering at the following website: [email or web address]. By joining [employer]’s FollowMyHealth portal, you will also be able to view your health information using other applications (apps) compatible with our system.

## 2024-02-26 NOTE — ED ADULT NURSE REASSESSMENT NOTE - NS ED NURSE REASSESS COMMENT FT1
Patient received from RN @ shift change. Pt remains resting in bed. Per MD DC patients DC tele @ this time. IV site assessed - dry intact transparent, flushing well, good blood return, no abnormalities noted. A&Ox4, HEENT clear, LS equal clear bilat, denies SOB, pt on room air, denies CP, abd SNT. Visualized independent ambulation with steady gait to bathroom. Pt denies new CO pain or discomfort @ this time. Pt pending urine and awaiting dispo.

## 2024-02-26 NOTE — ED ADULT NURSE REASSESSMENT NOTE - NS ED NURSE REASSESS COMMENT FT1
Patient remains resting in bed. Pt DCed and awaiting ride. Ambulation trial done - patient ambulated independently with steady gait to bathroom. Per note in patient chart by MD - neighbor took patients belongings home. Pt awaiting family to  and provide clothing. No complaints @ this time. Safety maintained, fall precautions reinforced.

## 2024-02-26 NOTE — ED ADULT TRIAGE NOTE - CHIEF COMPLAINT QUOTE
biba from store s/p "witnessed seizure"; pt a&ox1, post ictal on arrival, not cooperative, poor historian. per EMS the store owner called 911 and "sat her down in the store"; ems states pt had +N/V en route. per D/c papers found w/ patient; pt recently dx w/ metabolic encephalopathy"

## 2024-02-27 VITALS
SYSTOLIC BLOOD PRESSURE: 115 MMHG | HEART RATE: 81 BPM | RESPIRATION RATE: 18 BRPM | OXYGEN SATURATION: 99 % | TEMPERATURE: 98 F | DIASTOLIC BLOOD PRESSURE: 67 MMHG

## 2024-02-27 LAB
CULTURE RESULTS: SIGNIFICANT CHANGE UP
SPECIMEN SOURCE: SIGNIFICANT CHANGE UP

## 2024-02-27 NOTE — ED ADULT NURSE REASSESSMENT NOTE - NS ED NURSE REASSESS COMMENT FT1
Patient remains DCed. Family has not shown to  patient. MD to make additional effort to contact family. No CO pain or discomfort @ this time. Safety maintained, fall precautions reinforced. Pt ambulated to bathroom independently with steady gait.

## 2024-03-25 ENCOUNTER — EMERGENCY (EMERGENCY)
Facility: HOSPITAL | Age: 74
LOS: 1 days | Discharge: HOME | End: 2024-03-25
Attending: STUDENT IN AN ORGANIZED HEALTH CARE EDUCATION/TRAINING PROGRAM
Payer: MEDICARE

## 2024-03-25 VITALS
TEMPERATURE: 98 F | OXYGEN SATURATION: 96 % | HEART RATE: 93 BPM | RESPIRATION RATE: 18 BRPM | SYSTOLIC BLOOD PRESSURE: 121 MMHG | DIASTOLIC BLOOD PRESSURE: 59 MMHG

## 2024-03-25 LAB
ALBUMIN SERPL ELPH-MCNC: 4.3 G/DL — SIGNIFICANT CHANGE UP (ref 3.3–5.2)
ALP SERPL-CCNC: 103 U/L — SIGNIFICANT CHANGE UP (ref 40–120)
ALT FLD-CCNC: 21 U/L — SIGNIFICANT CHANGE UP
ANION GAP SERPL CALC-SCNC: 16 MMOL/L — SIGNIFICANT CHANGE UP (ref 5–17)
ANISOCYTOSIS BLD QL: SLIGHT — SIGNIFICANT CHANGE UP
APAP SERPL-MCNC: <3 UG/ML — LOW (ref 10–26)
APTT BLD: 25.8 SEC — SIGNIFICANT CHANGE UP (ref 24.5–35.6)
AST SERPL-CCNC: 29 U/L — SIGNIFICANT CHANGE UP
BASOPHILS # BLD AUTO: 0 K/UL — SIGNIFICANT CHANGE UP (ref 0–0.2)
BASOPHILS NFR BLD AUTO: 0 % — SIGNIFICANT CHANGE UP (ref 0–2)
BILIRUB SERPL-MCNC: 0.2 MG/DL — LOW (ref 0.4–2)
BUN SERPL-MCNC: 10.4 MG/DL — SIGNIFICANT CHANGE UP (ref 8–20)
CALCIUM SERPL-MCNC: 9.4 MG/DL — SIGNIFICANT CHANGE UP (ref 8.4–10.5)
CHLORIDE SERPL-SCNC: 100 MMOL/L — SIGNIFICANT CHANGE UP (ref 96–108)
CO2 SERPL-SCNC: 24 MMOL/L — SIGNIFICANT CHANGE UP (ref 22–29)
CREAT SERPL-MCNC: 0.41 MG/DL — LOW (ref 0.5–1.3)
EGFR: 103 ML/MIN/1.73M2 — SIGNIFICANT CHANGE UP
EOSINOPHIL # BLD AUTO: 0 K/UL — SIGNIFICANT CHANGE UP (ref 0–0.5)
EOSINOPHIL NFR BLD AUTO: 0 % — SIGNIFICANT CHANGE UP (ref 0–6)
ETHANOL SERPL-MCNC: <10 MG/DL — SIGNIFICANT CHANGE UP (ref 0–9)
GIANT PLATELETS BLD QL SMEAR: PRESENT — SIGNIFICANT CHANGE UP
GLUCOSE SERPL-MCNC: 146 MG/DL — HIGH (ref 70–99)
HCT VFR BLD CALC: 34.1 % — LOW (ref 34.5–45)
HGB BLD-MCNC: 10.2 G/DL — LOW (ref 11.5–15.5)
INR BLD: 0.96 RATIO — SIGNIFICANT CHANGE UP (ref 0.85–1.18)
LACTATE BLDV-MCNC: 1.3 MMOL/L — SIGNIFICANT CHANGE UP (ref 0.5–2)
LYMPHOCYTES # BLD AUTO: 0.15 K/UL — LOW (ref 1–3.3)
LYMPHOCYTES # BLD AUTO: 2.7 % — LOW (ref 13–44)
MANUAL SMEAR VERIFICATION: SIGNIFICANT CHANGE UP
MCHC RBC-ENTMCNC: 23.4 PG — LOW (ref 27–34)
MCHC RBC-ENTMCNC: 29.9 GM/DL — LOW (ref 32–36)
MCV RBC AUTO: 78.2 FL — LOW (ref 80–100)
MONOCYTES # BLD AUTO: 0.1 K/UL — SIGNIFICANT CHANGE UP (ref 0–0.9)
MONOCYTES NFR BLD AUTO: 1.8 % — LOW (ref 2–14)
NEUTROPHILS # BLD AUTO: 5.33 K/UL — SIGNIFICANT CHANGE UP (ref 1.8–7.4)
NEUTROPHILS NFR BLD AUTO: 94.6 % — HIGH (ref 43–77)
PLAT MORPH BLD: NORMAL — SIGNIFICANT CHANGE UP
PLATELET # BLD AUTO: 213 K/UL — SIGNIFICANT CHANGE UP (ref 150–400)
POIKILOCYTOSIS BLD QL AUTO: SLIGHT — SIGNIFICANT CHANGE UP
POLYCHROMASIA BLD QL SMEAR: SLIGHT — SIGNIFICANT CHANGE UP
POTASSIUM SERPL-MCNC: 4 MMOL/L — SIGNIFICANT CHANGE UP (ref 3.5–5.3)
POTASSIUM SERPL-SCNC: 4 MMOL/L — SIGNIFICANT CHANGE UP (ref 3.5–5.3)
PROT SERPL-MCNC: 6.4 G/DL — LOW (ref 6.6–8.7)
PROTHROM AB SERPL-ACNC: 10.7 SEC — SIGNIFICANT CHANGE UP (ref 9.5–13)
RBC # BLD: 4.36 M/UL — SIGNIFICANT CHANGE UP (ref 3.8–5.2)
RBC # FLD: 19.8 % — HIGH (ref 10.3–14.5)
RBC BLD AUTO: ABNORMAL
SALICYLATES SERPL-MCNC: <0.6 MG/DL — LOW (ref 10–20)
SODIUM SERPL-SCNC: 140 MMOL/L — SIGNIFICANT CHANGE UP (ref 135–145)
VARIANT LYMPHS # BLD: 0.9 % — SIGNIFICANT CHANGE UP (ref 0–6)
WBC # BLD: 5.63 K/UL — SIGNIFICANT CHANGE UP (ref 3.8–10.5)
WBC # FLD AUTO: 5.63 K/UL — SIGNIFICANT CHANGE UP (ref 3.8–10.5)

## 2024-03-25 PROCEDURE — 99285 EMERGENCY DEPT VISIT HI MDM: CPT | Mod: GC

## 2024-03-25 PROCEDURE — 90792 PSYCH DIAG EVAL W/MED SRVCS: CPT

## 2024-03-25 PROCEDURE — 71045 X-RAY EXAM CHEST 1 VIEW: CPT | Mod: 26

## 2024-03-25 RX ORDER — MIDAZOLAM HYDROCHLORIDE 1 MG/ML
4 INJECTION, SOLUTION INTRAMUSCULAR; INTRAVENOUS ONCE
Refills: 0 | Status: DISCONTINUED | OUTPATIENT
Start: 2024-03-25 | End: 2024-03-25

## 2024-03-25 RX ORDER — HALOPERIDOL DECANOATE 100 MG/ML
5 INJECTION INTRAMUSCULAR ONCE
Refills: 0 | Status: COMPLETED | OUTPATIENT
Start: 2024-03-25 | End: 2024-03-25

## 2024-03-25 RX ADMIN — Medication 2 MILLIGRAM(S): at 21:52

## 2024-03-25 RX ADMIN — Medication 2 MILLIGRAM(S): at 23:04

## 2024-03-25 RX ADMIN — HALOPERIDOL DECANOATE 5 MILLIGRAM(S): 100 INJECTION INTRAMUSCULAR at 21:27

## 2024-03-25 RX ADMIN — Medication 1 MILLIGRAM(S): at 20:32

## 2024-03-25 RX ADMIN — Medication 1 MILLIGRAM(S): at 21:09

## 2024-03-25 NOTE — ED ADULT TRIAGE NOTE - CHIEF COMPLAINT QUOTE
Pt BIBA from home, pt husbands HHA called 911 for pt for AMS, LKW 6pm yesterday, pt AOx1 upon ED arrival, was combative for EMS, vomited in ambulance and aggression stopped, HHA told EMS this has happened multiple times in past, pt unable to answer RN questions Pt BIBA from home, pt boyfriend HHA called 911 for pt for AMS, LKW 6pm yesterday, pt AOx1 upon ED arrival, was combative for EMS, vomited in ambulance and aggression stopped, HHA told EMS this has happened multiple times in past, pt unable to answer RN questions

## 2024-03-25 NOTE — ED PROVIDER NOTE - CLINICAL SUMMARY MEDICAL DECISION MAKING FREE TEXT BOX
74 y/o F w/ PMH of bipolar dx vs schizo affective disorder, HTN, HLD, DMII presenting w/ ams. labs, cultures, ct ordered.

## 2024-03-25 NOTE — ED PROVIDER NOTE - ATTENDING CONTRIBUTION TO CARE
72 y/o F w/ PMH of bipolar dx vs schizo affective disorder, HTN, HLD, DMII presenting With altered mental status today.  Patient was last seen well at 6 PM last night.  Per EMS family and home health aide reporting patient has been acting out herself all day today.  Per EMS patient was combative on their arrival and was A&O x 0.  No witnessed seizure activity. here pt is oriented to self, no longer combative but not contributing to history.    Patient unable to contribute to history.  Patient appears agitated and unable to be redirected.  AMS workup ordered including CT head.  Labs, UA, medications for agitation.  Patient will need behavioral health consultation for symptoms as well as family is concerned that she has been noncompliant with psychiatric medications and mental status is continuing to fluctuate.

## 2024-03-25 NOTE — ED ADULT NURSE REASSESSMENT NOTE - NS ED NURSE REASSESS COMMENT FT1
alert and oriented x1 pt pending ct scan, unable to lay still for ct scan at this time. pt continuing to try to get out of bed and take off blankets. RR even/unlabored, Remains on continuos cardiac monitoring nsr,  wdl on ra. MD Radford aware. safety sit in place.

## 2024-03-25 NOTE — ED PROVIDER NOTE - OBJECTIVE STATEMENT
SUBJECTIVE:  Jacquelynne Meckel is a 33 y.o. female [facility] Complaint   Patient presents with    Follow-up     PREGNACE TEST         PT HERE FOR EVAL     C/O LOW BACK PAIN - PAST FEW WEEKS. MOODY. ? XTER, NO RAD, PAIN SCALE 5/10. NO NUMBNESS/ NO TINGLING  CONCERN ABOUT PREGNANCY  - LMP 12/21  C/O DIZZINESS -  INCREASED RECENTLY. + VERTIGO, NO TINNITUS, NO HEARING LOSS  C/O URI FREQ - ? > 1 WEEK. NO DYSURIA ,   No URGENCY, No HEMATURIA   + FH DM - ? DIET / EXERCISE COMPLIANCE.      DENIES CP, No SOB, No PALPITATIONS, No COUGH, NO F/C  No ABD PAIN, OCC NAUSEA, + VOMITING - RESOLVED, No DIARRHEA, No CONSTIPATION, No MELENA, No HEMATOCHEZIA. PMH: REVIEWED AND UPDATED TODAY    PSH: REVIEWED AND UPDATED TODAY    SOCIAL HX: REVIEWED AND UPDATED TODAY    FAMILY HX: REVIEWED AND UPDATED TODAY    ALLERGY:  Patient has no known allergies. MEDS: REVIEWED  Prior to Visit Medications    Medication Sig Taking? Authorizing Provider   carbamide peroxide (DEBROX) 6.5 % otic solution Place 5 drops in ear(s) 2 times daily Yes Jarocho Ponce MD       ROS: COMPREHENSIVE ROS AS IN HX, REST -VE  History obtained from chart review and the patient       OBJECTIVE:   NURSING NOTE AND VITALS REVIEWED  /70 (Site: Left Upper Arm, Position: Sitting, Cuff Size: Large Adult)   Pulse 82   Temp 96.7 °F (35.9 °C)   Ht 5' 8\" (1.727 m)   Wt 159 lb (72.1 kg)   LMP 12/18/2021   SpO2 98%   Breastfeeding No   BMI 24.18 kg/m²     NO ACUTE DISTRESS    REPEAT BP: 110/80 (LT), NO ORTHOSTASIS     Body mass index is 24.18 kg/m². HEENT: NO PALLOR, ANICTERIC, PERRLA, EOMI, NO CONJUNCTIVAL ERYTHEMA,                 NO SINUS TENDERNESS  NECK:  SUPPLE, TRACHEA MIDLINE, NT, NO JVD, NO CB, NO LA, NO TM, NO STIFFNESS  CHEST: RESPY EFFORT NL, GOOD AE, NO W/R/C  HEART: S1S2+ REG, NO M/G/R  ABD: SOFT, NT, NO HSM, BS+  EXT: NO EDEMA, NT, PULSES +.  MARTIN'S -VE  NEURO: ALERT AND ORIENTED X 3, NO MENINGEAL SIGNS, NO TREMORS, NL GAIT, NO FOCAL DEFICITS  PSYCH: FAIRLY GOOD AFFECT  BACK: NT, NO ROM, NO CVA TENDERNESS     PREVIOUS LABS REVIEWED AND D/W PT / PREVIOUS LABS PENDING    UA: SMALL BLOOD, PROT 30, + NITRITE, LARGE LEUKOCYTES    PREG TEST:  NEGATIVE    ASSESSMENT / PLAN:     Diagnosis Orders   1. Acute bilateral low back pain without sciatica  COUNSELLED. EXERCISES. ANALGESICS PRN. MONITOR. DEFERRED PRESCRIPTION MED  HOME EXERCISES  MAKE CHANGES AS NEEDED. 2. Pregnancy examination or test, negative result  COUNSELLED. NEGATIVE TEST D/W PT       3. Dizziness / Vertigo  COUNSELLED. START ON meclizine 25 MG TID / PRN  LABS TO EVAL. GRADUAL POSITION CHANGE. MAINTAIN HYDRATION. MONITOR. MAKE CHANGES AS NEEDED. 4. Acute UTI  COUNSELLED. RX WITH BACTRIM DS BID   C $ S . MAKE CHANGES AS NEEDED BASED ON RESULT. 5. Family history of diabetes mellitus (DM)  COUNSELLED. ADVISED ON DIET / EXERCISE  ADVISED RISK FACTOR MODIFICATION. F/U LABS TO EVAL. MONITOR  MAKE CHANGES AS NEEDED.                          MEDICATION SIDE EFFECTS D/W PATIENT    RETURN TO CLINIC WITHIN  3 MONTHS / PRN    FOLLOW UP FOR FASTING LABS 72 y/o F w/ PMH of bipolar dx vs schizo affective disorder, HTN, HLD, DMII presenting With altered mental status today.  Patient was last seen well at 6 PM last night.  Per EMS family and home health aide reporting patient has been acting out herself all day today.  Per EMS patient was combative on their arrival and was A&O x 0.  No witnessed seizure activity. here pt is oriented to self, no longer combative but not contributing to history.

## 2024-03-25 NOTE — ED PROVIDER NOTE - PHYSICAL EXAMINATION
Gen: NAD  Head: NC/AT  Neck: trachea midline  Card: regular rate and rhythm  Resp:  CTAB  Abd: soft, non-distended, non-tender  Ext: no deformities above reported baseline  Neuro:  A&Ox1, moving all extremities spontaneously.  Skin:  Warm and dry as visualized  Psych:  unable to assess.

## 2024-03-25 NOTE — ED PROVIDER NOTE - PROGRESS NOTE DETAILS
Pt's daughter Timothy [phone] called and reporting noncompliance w/ psych meds. pt acting strange, having negative medical workup and return to baseline w/ subsequent discharges. is requesting psychiatric evaluation. -Markus AJM: workup unremarkable for acute issues. unable to obtain ct head due to continued agitation despite multiple sedation meds. Will order additional sedation meds. Of note, pt has had 2 negative CT head tests in the past 2 months when presenting with similar symptoms Collin: Pt received in sin signout from Dr. Hutchinson. Pt medically cleared. Seen by psych; to be held for reassessment.

## 2024-03-25 NOTE — ED ADULT NURSE NOTE - NSFALLHARMRISKINTERV_ED_ALL_ED
Assistance OOB with selected safe patient handling equipment if applicable/Assistance with ambulation/Communicate risk of Fall with Harm to all staff, patient, and family/Monitor gait and stability/Monitor for mental status changes and reorient to person, place, and time, as needed/Provide visual cue: red socks, yellow wristband, yellow gown, etc/Reinforce activity limits and safety measures with patient and family/Toileting schedule using arm’s reach rule for commode and bathroom/Use of alarms - bed, stretcher, chair and/or video monitoring/Bed in lowest position, wheels locked, appropriate side rails in place/Call bell, personal items and telephone in reach/Instruct patient to call for assistance before getting out of bed/chair/stretcher/Non-slip footwear applied when patient is off stretcher/[place] to call system/Physically safe environment - no spills, clutter or unnecessary equipment/Purposeful Proactive Rounding/Room/bathroom lighting operational, light cord in reach

## 2024-03-25 NOTE — ED ADULT NURSE NOTE - OBJECTIVE STATEMENT
pt AOx2, breathing even and unlabored. pt presents to ED from home after family noticing a change in mental status. PMH schizophrenia and bipolar disorder. confusion present and pt is repeatedly trying to get out of bed and walk around. pt placed on continuous cardiac monitor, NSR. CP WDL on RA. pt in NAD.

## 2024-03-25 NOTE — ED ADULT NURSE NOTE - CHIEF COMPLAINT QUOTE
Pt BIBA from home, pt boyfriend HHA called 911 for pt for AMS, LKW 6pm yesterday, pt AOx1 upon ED arrival, was combative for EMS, vomited in ambulance and aggression stopped, HHA told EMS this has happened multiple times in past, pt unable to answer RN questions

## 2024-03-26 DIAGNOSIS — Z86.59 PERSONAL HISTORY OF OTHER MENTAL AND BEHAVIORAL DISORDERS: ICD-10-CM

## 2024-03-26 DIAGNOSIS — R41.0 DISORIENTATION, UNSPECIFIED: ICD-10-CM

## 2024-03-26 LAB
APPEARANCE UR: CLEAR — SIGNIFICANT CHANGE UP
BILIRUB UR-MCNC: NEGATIVE — SIGNIFICANT CHANGE UP
COLOR SPEC: YELLOW — SIGNIFICANT CHANGE UP
DIFF PNL FLD: NEGATIVE — SIGNIFICANT CHANGE UP
GLUCOSE UR QL: >=1000 MG/DL
GRAM STN FLD: ABNORMAL
KETONES UR-MCNC: 15 MG/DL
LEUKOCYTE ESTERASE UR-ACNC: NEGATIVE — SIGNIFICANT CHANGE UP
NITRITE UR-MCNC: NEGATIVE — SIGNIFICANT CHANGE UP
PH UR: 7 — SIGNIFICANT CHANGE UP (ref 5–8)
PROT UR-MCNC: NEGATIVE MG/DL — SIGNIFICANT CHANGE UP
SP GR SPEC: 1.02 — SIGNIFICANT CHANGE UP (ref 1–1.03)
SPECIMEN SOURCE: SIGNIFICANT CHANGE UP
UROBILINOGEN FLD QL: 0.2 MG/DL — SIGNIFICANT CHANGE UP (ref 0.2–1)

## 2024-03-26 PROCEDURE — 93010 ELECTROCARDIOGRAM REPORT: CPT

## 2024-03-26 PROCEDURE — 99215 OFFICE O/P EST HI 40 MIN: CPT

## 2024-03-26 PROCEDURE — 99223 1ST HOSP IP/OBS HIGH 75: CPT

## 2024-03-26 PROCEDURE — 70450 CT HEAD/BRAIN W/O DYE: CPT | Mod: 26,MC

## 2024-03-26 RX ORDER — HALOPERIDOL DECANOATE 100 MG/ML
5 INJECTION INTRAMUSCULAR ONCE
Refills: 0 | Status: COMPLETED | OUTPATIENT
Start: 2024-03-26 | End: 2024-03-26

## 2024-03-26 RX ORDER — MIDAZOLAM HYDROCHLORIDE 1 MG/ML
5 INJECTION, SOLUTION INTRAMUSCULAR; INTRAVENOUS ONCE
Refills: 0 | Status: DISCONTINUED | OUTPATIENT
Start: 2024-03-26 | End: 2024-03-26

## 2024-03-26 RX ORDER — KETAMINE HYDROCHLORIDE 100 MG/ML
100 INJECTION INTRAMUSCULAR; INTRAVENOUS ONCE
Refills: 0 | Status: DISCONTINUED | OUTPATIENT
Start: 2024-03-26 | End: 2024-03-26

## 2024-03-26 RX ADMIN — KETAMINE HYDROCHLORIDE 100 MILLIGRAM(S): 100 INJECTION INTRAMUSCULAR; INTRAVENOUS at 03:45

## 2024-03-26 RX ADMIN — MIDAZOLAM HYDROCHLORIDE 5 MILLIGRAM(S): 1 INJECTION, SOLUTION INTRAMUSCULAR; INTRAVENOUS at 01:09

## 2024-03-26 RX ADMIN — HALOPERIDOL DECANOATE 5 MILLIGRAM(S): 100 INJECTION INTRAMUSCULAR at 01:57

## 2024-03-26 RX ADMIN — MIDAZOLAM HYDROCHLORIDE 4 MILLIGRAM(S): 1 INJECTION, SOLUTION INTRAMUSCULAR; INTRAVENOUS at 00:00

## 2024-03-26 NOTE — ED ADULT NURSE REASSESSMENT NOTE - NS ED NURSE REASSESS COMMENT FT1
pt alert and oriented x1. pt remains agitated and continuously moving trying to get out of bed. Pt pending ct scan, unable to obtain at this time due to agitation. Medicated as per orders. Pt RR even/unlabored. remains on continuous cardiac monitoring nsr,  wdl on ra. safety sitter at bedside.

## 2024-03-26 NOTE — ED BEHAVIORAL HEALTH PROGRESS NOTE - NS ED BHA PLAN PSYCHIATRIC ISSUES2 FT
Given pt's unclear adherence(as per collateral), recommend holding home  Given pt's unclear adherence with her med regimen, will recommend holding home med for now. For agitation, can offer PO/IM haldol 5 mg q6H PRN. If IM given, obtain repeat ecg and hold antipsychotics if QTC>500

## 2024-03-26 NOTE — ED ADULT NURSE REASSESSMENT NOTE - NS ED NURSE REASSESS COMMENT FT1
Assumed care of pt at 19:15 as stated in report from LON Cottrell. Charting as noted. Pt is resting in stretcher comfortably at this time, no apparent distress noted at this time. Pt safety maintained. Pt denies any complaints at this time. Pt remains on CM in NSR.

## 2024-03-26 NOTE — ED ADULT NURSE REASSESSMENT NOTE - NS ED NURSE REASSESS COMMENT FT1
Assumed care of pt at 2320 as stated in report from LON [place]. Charting as noted. Pt is resting in stretcher comfortably at this time, no apparent distress noted at this time. Pt safety maintained. Pt safety sit placed. SNA at bedside. Pt denies any complaints at this time. Pt remains on CM in  NSR and .

## 2024-03-26 NOTE — ED CDU PROVIDER INITIAL DAY NOTE - OBJECTIVE STATEMENT
72 y/o F w/ PMH of bipolar dx vs schizo affective disorder, HTN, HLD, DMII presenting With altered mental status today.  Patient was last seen well at 6 PM last night.  Per EMS family and home health aide reporting patient has been acting out herself all day today.  Per EMS patient was combative on their arrival and was A&O x 0.  No witnessed seizure activity. here pt is oriented to self, no longer combative but not contributing to history.

## 2024-03-26 NOTE — ED BEHAVIORAL HEALTH PROGRESS NOTE - DETAILS:
Sign-out received from day team. Chart reviewed. Pt assessed via Tele-doc camera. On assessment, pt is A&O x2 (oriented to location; oriented to self, but not the president; oriented to month/date, but not the year; does not know why she's in the hospital), presents as calm Sign-out received from day team. Chart reviewed. Pt assessed via Tele-doc camera. On assessment, pt is A&O x2 (oriented to location; oriented to self, but not the president; oriented to month/date, but not the year; does not know why she's in the hospital), presents as lethargic but arousable to voice, with slurred speech, and states she can't recall any of yesterday's events. The pt confirms she currently feels less confused and more alert, but can't states whether she feels completely back to baseline. Pt states she "vaped [marijuana] a little bit" prior to coming to the ED, but cannot state the exact amount and denies other substance use. She states she's been taking her meds as prescribed, but can't recall the names or doses, and does not recall the name of her outpt psych provider. She currently denies SI/HI/AH/VH/PI.

## 2024-03-26 NOTE — ED ADULT NURSE REASSESSMENT NOTE - NS ED NURSE REASSESS COMMENT FT1
Pt brought into ISO 3 with telepsych monitor at bedside. Pt remains on CM in NSR and . Pt aide at bedside. Pt is resting in stretcher comfortably at this time, no apparent distress noted at this time. Pt safety maintained. Pt denies any complaints at this time.

## 2024-03-26 NOTE — ED ADULT NURSE REASSESSMENT NOTE - NS ED NURSE REASSESS COMMENT FT1
Pt remains connected to continuos cardiac monitoring, SNA at bedside for safety sit. Pending disposition and psych consultation. Sleeping comfortably at this time.

## 2024-03-26 NOTE — ED BEHAVIORAL HEALTH ASSESSMENT NOTE - DESCRIPTION
Per chart review hypertension hyperlipidemia diabetes mellitus Vital Signs Last 24 Hrs  T(C): 36.4 (26 Mar 2024 11:48), Max: 37.3 (26 Mar 2024 04:16)  T(F): 97.6 (26 Mar 2024 11:48), Max: 99.2 (26 Mar 2024 04:16)  HR: 72 (26 Mar 2024 11:48) (72 - 96)  BP: 125/55 (26 Mar 2024 11:48) (121/59 - 148/65)  BP(mean): --  RR: 20 (26 Mar 2024 11:48) (16 - 20)  SpO2: 98% (26 Mar 2024 11:48) (95% - 100%)    Parameters below as of 26 Mar 2024 11:48  Patient On (Oxygen Delivery Method): room air as per above

## 2024-03-26 NOTE — ED BEHAVIORAL HEALTH PROGRESS NOTE - RISK ASSESSMENT
On interview, the pt is not fully oriented, presents as lethargic but arousable to voice, and states she has no recollection of yesterday's events. She denies acute safety concerns and reports taking her meds as prescribed, but is unable to recall the names/doses of meds, and seems to be an unreliable historian. At this time, it is unclear whether she is back to her psychiatric baseline or if her presentation is consistent with residual delirium. As such, will continue with plan to hold her for further observation and reassessment after she has additional time to metabolize. On interview, the pt is not fully oriented, presents as lethargic but arousable to voice, and states she has no recollection of yesterday's events. She denies acute safety concerns and reports taking her meds as prescribed, but is unable to recall the names/doses of meds, and seems to be an overall unreliable historian. At this time, it is unclear whether she is back to her psychiatric baseline or if her presentation is consistent with residual delirium in the setting of recent substances and/or a separate medical etiology(given positive blood cultures and recent medical admission for metabolic encephalopathy). This writer discussed medical admission with ED provider, who indicates another set of blood cultures will be sent due to concern for possible contamination. As such, will continue with plan to hold her for further observation and reassessment after she has additional time to metabolize and to obtain additional collateral from outpt provider/partner.

## 2024-03-26 NOTE — ED BEHAVIORAL HEALTH ASSESSMENT NOTE - OTHER PAST PSYCHIATRIC HISTORY (INCLUDE DETAILS REGARDING ONSET, COURSE OF ILLNESS, INPATIENT/OUTPATIENT TREATMENT)
per psyckes and collateral  schizophrenia with at least one remote inpatient psychiatric hospitalization in the ?80s  outpatient mental health follow up through [facility] olivia rasheed provider  no known prior suicide attempts or non suicidal self injurious behavior  historically irritable and agitated though not physically assaultive or violent

## 2024-03-26 NOTE — ED BEHAVIORAL HEALTH ASSESSMENT NOTE - SUMMARY
73 year old woman with male partner domiciled unemployed on disability pph per collateral and psyckes schizophrenia (suspect also cannabis use by family, recent uds+) with at least one remote prior inpatient psychiatric hospitalization in the ?80s outpatient mental health follow up through [facility] olivia rasheed psych np no known prior suicide attempts or non suicidal self injurious behavior historically irritable and agitated though not physically assaultive or violent (no legal history/violation of orders of protection) who was brought in by ems activated by family (partner's son) for altered mentation.  longstanding schizophrenia, generally compliant with follow up and medications with assistance. limited history/interview due to patient condition/factors, unarousable likely 2/2 agitation medications received overnight. possible medical contributions to current clinical presentation considering recent medical admission for metabolic encephalopathy and altered mentation+urinary incontinence immediately prior to being brought to hospital. will hold for re assess and additional collateral from outpatient provider (discreet message left for olivia rasheed [facility]).

## 2024-03-26 NOTE — ED BEHAVIORAL HEALTH ASSESSMENT NOTE - HPI (INCLUDE ILLNESS QUALITY, SEVERITY, DURATION, TIMING, CONTEXT, MODIFYING FACTORS, ASSOCIATED SIGNS AND SYMPTOMS)
73 year old woman with male partner domiciled unemployed on disability pph per collateral and psyckes schizophrenia (suspect also cannabis use by family, recent uds+) with at least one remote prior inpatient psychiatric hospitalization in the ?80s outpatient mental health follow up through [facility] olivia rasheed psych np no known prior suicide attempts or non suicidal self injurious behavior historically irritable and agitated though not physically assaultive or violent (no legal history/violation of orders of protection) who was brought in by ems activated by family (partner's son) for altered mentation.    Per chart review  Current ED encounter  "72 y/o F w/ PMH of bipolar dx vs schizo affective disorder, HTN, HLD, DMII presenting With altered mental status today.  Patient was last seen well at 6 PM last night.  Per EMS family and home health aide reporting patient has been acting out herself all day today.  Per EMS patient was combative on their arrival and was A&O x 0.  No witnessed seizure activity. here pt is oriented to self, no longer combative but not contributing to history."    of note had a recent medical admission for metabolic encephalopathy please refer to chart    multiple attempts to interview patient throughout morning and afternoon  unarousable despite attempts  per staff report had been agitated earlier requiring medications, mar noted for ketamine midazolam haloperidol lorazepam overnight    Collateral information obtained from daughter iqra over phone  confirm history of schizophrenia which had been diagnosed several years ago though unsure of exact age estimates 30s-40s. at baseline remains pleasant though with residual psychosis despite medication adherence which until recently compliance had been most reassured as her boyfriend/partner helped with admin/distribution of her psychotropics (though due to his own ailments limiting his care for her now). provides examples of his residual psychosis: mumbles often easily irritable agitated (though not typically physical) obsessive about lights and the "war in the lights" "people see her and hear her" 73 year old woman with male partner domiciled unemployed on disability pph per collateral and psyckes schizophrenia (suspect also cannabis use by family, recent uds+) with at least one remote prior inpatient psychiatric hospitalization in the ?80s outpatient mental health follow up through [facility] olivia rasheed psych np no known prior suicide attempts or non suicidal self injurious behavior historically irritable and agitated though not physically assaultive or violent (no legal history/violation of orders of protection) who was brought in by ems activated by family (partner's son) for altered mentation.    Per chart review  Current ED encounter  "72 y/o F w/ PMH of bipolar dx vs schizo affective disorder, HTN, HLD, DMII presenting With altered mental status today.  Patient was last seen well at 6 PM last night.  Per EMS family and home health aide reporting patient has been acting out herself all day today.  Per EMS patient was combative on their arrival and was A&O x 0.  No witnessed seizure activity. here pt is oriented to self, no longer combative but not contributing to history."    of note had a recent medical admission for metabolic encephalopathy please refer to chart    multiple attempts to interview patient throughout morning and afternoon  unarousable despite attempts  per staff report had been agitated earlier requiring medications, mar noted for ketamine midazolam haloperidol lorazepam overnight    Collateral information obtained from daughter iqra over phone  confirm history of schizophrenia which had been diagnosed several years ago though unsure of exact age estimates 30s-40s. at baseline pleasant though with residual psychosis despite medication adherence which until recently compliance had been mostly reassured as her boyfriend/partner helped with admin/distribution of her psychotropics (though due to his own ailments limiting his care for her now). provides examples of her residual psychosis: mumbles often easily irritable agitated (though not typically physical) obsessive about lights and the "war in the lights" "people see her and hear her" at times paranoid about people (eg believing things are being stolen). that said, highly functional otherwise.    Collateral information obtained from boyfriend's son gracia over phone  clarify events. provides context that patient resides with him and his father (her boyfriend) they have been together for >30 years. states that at baseline she's odd and is apparent. provides examples of how she'll engage in self dialogue at times. otherwise is pleasant though can be irritable and agitated towards her daughter. consistent with her daughter.. his father up until recently had been helping care for her specifically with her medications. father now limited and can no longer help. patient does not have any outside assistance eg hha or vns. believes she may be accidentally using less vs more of her psychotropics unintentionally. does not believe that she has any clinically significant cognitive issues. prior to coming to hospital had been at baseline. yesterday gracia had received a call while out from the aide of his father marilyn that "she's confused" and "she was sleeping on the floor" (beside her bed). gracia rushed home to assess in person. noted her to be resting on floor with blanket and pillow no distress. nudged her and reminded her to rest on her bed. "got up fine". gracia called her daughter to have them speak with each other. 2-3 minutes passed and heard a "thud". found her on the floor again. no epileptic activity noted or eye rolling. ems activated. when they picked her up, gracia noted that she had prominent urinary incontinence (continent at baseline).    per collateral psychotropic medication regimen includes aripiprazole  per [facility] pharmacy most recently aripiprazole 20 mg daily perphenazine 4 mg nightly    attempted to contact olivia rasheed [facility]  unable to reach  discreet message left with  unit number

## 2024-03-26 NOTE — ED CDU PROVIDER INITIAL DAY NOTE - CLINICAL SUMMARY MEDICAL DECISION MAKING FREE TEXT BOX
72 y/o F w/ PMH of bipolar dx vs schizo affective disorder, HTN, HLD, DMII presenting w/ ams. Now medically cleared. Seen by psychiatry -- to be held for reassessment.

## 2024-03-26 NOTE — ED BEHAVIORAL HEALTH PROGRESS NOTE - NS ED BHA PLAN HOLD IN ED HANDOFF TO ED TEAM YN 2
Galilea Moise is a 76 y.o. male    Chief Complaint   Patient presents with    Follow-up     cirrhosis of liver       Visit Vitals  /69 (BP 1 Location: Left arm, BP Patient Position: Sitting)   Pulse 70   Temp 97.5 °F (36.4 °C) (Temporal)   Resp 18   Ht 5' 10\" (1.778 m)   Wt 228 lb (103.4 kg)   SpO2 96%   BMI 32.71 kg/m²       1. Have you been to the ER, urgent care clinic since your last visit? Hospitalized since your last visit? YES, [facility] for abscess on buttocks and toe. April 2020    2. Have you seen or consulted any other health care providers outside of the [address] [place] [address] since your last visit? Include any pap smears or colon screening.  No Yes

## 2024-03-26 NOTE — ED BEHAVIORAL HEALTH PROGRESS NOTE - NSBHADMITCOUNSEL_PSY_A_CORE
risks and benefits of treatment options/instructions for management, treatment and follow up/importance of adherence to chosen treatment Antonette Bellamy  (RN)  2019 23:08:44

## 2024-03-27 VITALS
OXYGEN SATURATION: 96 % | DIASTOLIC BLOOD PRESSURE: 65 MMHG | TEMPERATURE: 98 F | HEART RATE: 91 BPM | RESPIRATION RATE: 18 BRPM | SYSTOLIC BLOOD PRESSURE: 123 MMHG

## 2024-03-27 LAB
-  STAPHYLOCOCCUS EPIDERMIDIS, METHICILLIN RESISTANT: SIGNIFICANT CHANGE UP
CULTURE RESULTS: ABNORMAL
CULTURE RESULTS: ABNORMAL
METHOD TYPE: SIGNIFICANT CHANGE UP
ORGANISM # SPEC MICROSCOPIC CNT: ABNORMAL
ORGANISM # SPEC MICROSCOPIC CNT: SIGNIFICANT CHANGE UP
SPECIMEN SOURCE: SIGNIFICANT CHANGE UP
SPECIMEN SOURCE: SIGNIFICANT CHANGE UP

## 2024-03-27 PROCEDURE — 87077 CULTURE AEROBIC IDENTIFY: CPT

## 2024-03-27 PROCEDURE — 93005 ELECTROCARDIOGRAM TRACING: CPT

## 2024-03-27 PROCEDURE — G0378: CPT

## 2024-03-27 PROCEDURE — 87040 BLOOD CULTURE FOR BACTERIA: CPT

## 2024-03-27 PROCEDURE — 71045 X-RAY EXAM CHEST 1 VIEW: CPT

## 2024-03-27 PROCEDURE — 83605 ASSAY OF LACTIC ACID: CPT

## 2024-03-27 PROCEDURE — 96376 TX/PRO/DX INJ SAME DRUG ADON: CPT

## 2024-03-27 PROCEDURE — 96374 THER/PROPH/DIAG INJ IV PUSH: CPT

## 2024-03-27 PROCEDURE — 85730 THROMBOPLASTIN TIME PARTIAL: CPT

## 2024-03-27 PROCEDURE — 81003 URINALYSIS AUTO W/O SCOPE: CPT

## 2024-03-27 PROCEDURE — 85610 PROTHROMBIN TIME: CPT

## 2024-03-27 PROCEDURE — 87086 URINE CULTURE/COLONY COUNT: CPT

## 2024-03-27 PROCEDURE — 70450 CT HEAD/BRAIN W/O DYE: CPT | Mod: MC

## 2024-03-27 PROCEDURE — 87150 DNA/RNA AMPLIFIED PROBE: CPT

## 2024-03-27 PROCEDURE — 36415 COLL VENOUS BLD VENIPUNCTURE: CPT

## 2024-03-27 PROCEDURE — 99239 HOSP IP/OBS DSCHRG MGMT >30: CPT

## 2024-03-27 PROCEDURE — 82962 GLUCOSE BLOOD TEST: CPT

## 2024-03-27 PROCEDURE — 96372 THER/PROPH/DIAG INJ SC/IM: CPT | Mod: XU

## 2024-03-27 PROCEDURE — 85025 COMPLETE CBC W/AUTO DIFF WBC: CPT

## 2024-03-27 PROCEDURE — 99285 EMERGENCY DEPT VISIT HI MDM: CPT | Mod: 25

## 2024-03-27 PROCEDURE — 99213 OFFICE O/P EST LOW 20 MIN: CPT

## 2024-03-27 PROCEDURE — 80053 COMPREHEN METABOLIC PANEL: CPT

## 2024-03-27 PROCEDURE — 80307 DRUG TEST PRSMV CHEM ANLYZR: CPT

## 2024-03-27 PROCEDURE — 96375 TX/PRO/DX INJ NEW DRUG ADDON: CPT

## 2024-03-27 RX ORDER — ARIPIPRAZOLE 15 MG/1
10 TABLET ORAL ONCE
Refills: 0 | Status: COMPLETED | OUTPATIENT
Start: 2024-03-27 | End: 2024-03-27

## 2024-03-27 RX ADMIN — ARIPIPRAZOLE 10 MILLIGRAM(S): 15 TABLET ORAL at 12:03

## 2024-03-27 NOTE — CHART NOTE - NSCHARTNOTEFT_GEN_A_CORE
SW note: Worker alerted via day handoff that pt has been cleared both medically and psychiatrically for return back to address on her f/s. Continuing, handoff reported that MD norwood) spoke w/ pts family (boyfriend that resides at residence [place] [phone]) and family is accepting pt back- requesting transport be set up. Worker confirmed this information w/ attending. Due to pts hx of psychiatric illness- rosalba arranged via  [facility] (melissa) for next available. NEAF created and uploaded to ACM- transport letter left w/ packet due to pts AMS. RN provided safety questions. Packet left w/ RN at station. No other SW needs.

## 2024-03-27 NOTE — ED BEHAVIORAL HEALTH PROGRESS NOTE - CASE SUMMARY/FORMULATION (CLEARLY DOCUMENT RATIONALE FOR DISPOSITION CHANGE)
ams/falls more likely related to substance abuse, cannabis, more rather than medication side effect vs decompensated mental health condition.  can follow up with established outpatient mental health provider(s)  No indication for admission to an acute inpatient psychiatric unit. Combined psychopharmacology and outpatient psychotherapy are the primary treatment modalities that are most likely to assist the patient in developing more productive means of managing her mental health conditions and navigating stressors/substance use, rather than an inpatient psychiatric admission.
As above

## 2024-03-27 NOTE — ED CDU PROVIDER SUBSEQUENT DAY NOTE - CLINICAL SUMMARY MEDICAL DECISION MAKING FREE TEXT BOX
74 y/o F w/ PMH of bipolar dx vs schizo affective disorder, HTN, HLD, DMII presenting w/ ams. Now medically cleared. Seen by psychiatry -- to be held for reassessment.    Pt noted to have positive blood culture -- staph epidermidis in aerobic bottle, likely contaminant. Rpt cultures sent.

## 2024-03-27 NOTE — ED ADULT NURSE REASSESSMENT NOTE - NS ED NURSE REASSESS COMMENT FT1
Report received from offgoing RN, charting as noted. Patient is confused, awake and alert. Calm at this time.  awaiting re-eval by psych. Report received from offgoing RN, charting as noted. Patient is confused, resting in stretcher. Calm at this time.  awaiting re-eval by psych.

## 2024-03-27 NOTE — ED BEHAVIORAL HEALTH PROGRESS NOTE - SUMMARY
As per initail assessment: "73 year old woman with male partner domiciled unemployed on disability pph per collateral and psyckes schizophrenia (suspect also cannabis use by family, recent uds+) with at least one remote prior inpatient psychiatric hospitalization in the ?80s outpatient mental health follow up through [facility] olivia rasheed psych np no known prior suicide attempts or non suicidal self injurious behavior historically irritable and agitated though not physically assaultive or violent (no legal history/violation of orders of protection) who was brought in by ems activated by family (partner's son) for altered mentation.  longstanding schizophrenia, generally compliant with follow up and medications with assistance. limited history/interview due to patient condition/factors, unarousable likely 2/2 agitation medications received overnight. possible medical contributions to current clinical presentation considering recent medical admission for metabolic encephalopathy and altered mentation+urinary incontinence immediately prior to being brought to hospital. will hold for re assess and additional collateral from outpatient provider (discreet message left for olivia rasheed [facility])"
As per initail assessment: "73 year old woman with male partner domiciled unemployed on disability pph per collateral and psyckes schizophrenia (suspect also cannabis use by family, recent uds+) with at least one remote prior inpatient psychiatric hospitalization in the ?80s outpatient mental health follow up through [facility] olivia rasheed psych np no known prior suicide attempts or non suicidal self injurious behavior historically irritable and agitated though not physically assaultive or violent (no legal history/violation of orders of protection) who was brought in by ems activated by family (partner's son) for altered mentation.  longstanding schizophrenia, generally compliant with follow up and medications with assistance. limited history/interview due to patient condition/factors, unarousable likely 2/2 agitation medications received overnight. possible medical contributions to current clinical presentation considering recent medical admission for metabolic encephalopathy and altered mentation+urinary incontinence immediately prior to being brought to hospital. will hold for re assess and additional collateral from outpatient provider (discreet message left for olivia rasheed [facility])"

## 2024-03-27 NOTE — ED BEHAVIORAL HEALTH PROGRESS NOTE - RISK ASSESSMENT
rf:  mental health condition(s)  substance abuse    pf:  connected to care  no si/hi  family support  no access to firearm

## 2024-03-27 NOTE — ED ADULT NURSE REASSESSMENT NOTE - NS ED NURSE REASSESS COMMENT FT1
Pt placed on telebox. Pt is resting in stretcher comfortably at this time, no apparent distress noted at this time. Pt safety maintained. Pt denies any complaints at this time.

## 2024-03-27 NOTE — ED CDU PROVIDER DISPOSITION NOTE - NSFOLLOWUPINSTRUCTIONS_ED_ALL_ED_FT
Continue with your current home medications as prescribed.    Today you had repeat blood cultures out of concern for a possible contaminated blood culture. If the repeat cultures show any concerns for infection you will be contacted.     Return here or go to the nearest emergency department for repeat evaluation if you develop new symptoms of acute concern such as fevers, intractable nausea/vomiting, severe pain, intractable nausea/vomiting, or other new worries.

## 2024-03-27 NOTE — ED CDU PROVIDER DISPOSITION NOTE - PATIENT PORTAL LINK FT
You can access the FollowMyHealth Patient Portal offered by [facility] by registering at the following website: [email or web address]. By joining [facility]’s FollowMyHealth portal, you will also be able to view your health information using other applications (apps) compatible with our system.

## 2024-03-27 NOTE — ED BEHAVIORAL HEALTH PROGRESS NOTE - DETAILS
Advised patient to go to nearest ER or call 911, for any of the followin) agitation aggression or anxiety, 2) suicidal or homicidal thoughts 3) worsening of symptoms or 4) side effects of medications tho are aware

## 2024-03-27 NOTE — ED BEHAVIORAL HEALTH PROGRESS NOTE - NSBHMSERELATED_PSY_A_CORE
Kaylynn Brock Patient Age: 85 year old  MESSAGE: Interpreting service used: No      IM/FP- Home Health- Plan of Care      Maribel from [facility] requesting to know if the provider will follow the patient and sign the plan of care for home health services.    Home Health Agency: [facility]     Does caller request to speak to the clinical team at this time: NO-     Does caller require a call back: Yes- Route message to provider's clinical support pool         WEIGHT AND HEIGHT:   Wt Readings from Last 1 Encounters:   06/05/20 121.1 kg (267 lb)     Ht Readings from Last 1 Encounters:   06/05/20 4' 9\" (1.448 m)     BMI Readings from Last 1 Encounters:   06/05/20 57.78 kg/m²       ALLERGIES:  Alprazolam; Amoxicillin-pot clavulanate; Morphine; and Xanax  Current Outpatient Medications   Medication   • HYDROcodone-acetaminophen (NORCO) 7.5-325 MG per tablet   • metoPROLOL tartrate (LOPRESSOR) 25 MG tablet   • apixaBAN (ELIQUIS) 2.5 MG Tab   • atorvastatin (LIPITOR) 20 MG tablet   • calcitRIOL (ROCALTROL) 0.25 MCG capsule   • doxycycline hyclate (VIBRA-TABS) 100 MG tablet   • levothyroxine (SYNTHROID, LEVOTHROID) 25 MCG tablet   • furosemide (LASIX) 40 MG tablet   • calcium carbonate (TUMS) 500 MG chewable tablet   • ondansetron (ZOFRAN) 4 MG tablet   • lisinopril (ZESTRIL) 5 MG tablet   • VITAMIN D, ERGOCALCIFEROL, PO   • latanoprost (XALATAN) 0.005 % ophthalmic solution   • Multiple Vitamins-Minerals (CENTRUM SILVER 50+WOMEN) Tab     No current facility-administered medications for this visit.      PHARMACY to use: Ask Patient           Pharmacy preference(s) on file:   [id number] - [place], [place] - [address]  [address] [address]  Phone: [phone] Fax: [phone]    [facility] - [place], [place] - [address]  [address]  [place] [address]  Phone: [phone] 
Fax: [phone]      CALL BACK INFO: DO NOT LEAVE A MESSAGE - contact patient directly  ROUTING: OK to hold message for return of patient's physician. Pt aware that physician is out of the office.        PCP: Magen Izquierdo MD         INS: Payor: MEDICARE / Plan: PARTA AND B / Product Type: MEDICARE   PATIENT ADDRESS:  [address] [place] [address]      
Poor
Fair

## 2024-03-27 NOTE — ED CDU PROVIDER DISPOSITION NOTE - CLINICAL COURSE
73F presenting for evaluation of AMS; had been worked up a couple days ago, negative workup and cleared medically, today cleared by psychatry. D/w family over phone, report OK to return home, discussed return precautions. Stable on exam today.

## 2024-03-27 NOTE — ED BEHAVIORAL HEALTH PROGRESS NOTE - DETAILS:
Evaluated for follow up. alert and oriented to person place and somewhat to time (aware of month and year). throughout encounter calm cooperative pleasant cooperative. spoke with mild accent and mildly oddly related eg when asked about her thoughts of her ams/falls states it may be related to the "lights" and later makes a statement about bombs being present in viri. these thoughts/beliefs not particularly distressing to patient. reports stable mood without significant fluctuations. recalls vaping cannabis prior to coming to hospital. adds that she vapes cannabis regularly. provided education regarding deleterious effects of substance abuse, encouraging abstinence/cessation of use. no alcohol or tobacco use. no other recreational drug use. Denies any active significant suicidal/homicidal ideations plan or intent. Denies any active significant perceptual disturbances. Does not appear distressed dysphoric or overtly manic/psychotic. no access to firearms. Future oriented, forward looking, able to cite reasons for continued living.     Collateral information obtained from boyfriend katharina and katharina's son gracia [phone]  provided updates. advised that her dose of aripiprazole was decreased and tolerated well in ED. Collateral informants are without any significant safety concerns, comfortable with patient's discharge. patient resides with them and gracia states that her psychosis is at baseline and has been for decades. Comfortable with patient's discharge back home from a psychiatric perspective. instructed that patient should follow up with [facility].    Collateral information obtained from outpatient provider olivia rasheed [phone]  Collateral informant is without any significant safety concerns, comfortable with patient's discharge. states that she was at baseline at last appointment. intends to advise for a long acting injection. suspects that cannabis may likely contributing to current issues with ams/falls.

## 2024-03-31 LAB
CULTURE RESULTS: SIGNIFICANT CHANGE UP
SPECIMEN SOURCE: SIGNIFICANT CHANGE UP

## 2024-04-01 LAB
CULTURE RESULTS: SIGNIFICANT CHANGE UP
CULTURE RESULTS: SIGNIFICANT CHANGE UP
SPECIMEN SOURCE: SIGNIFICANT CHANGE UP
SPECIMEN SOURCE: SIGNIFICANT CHANGE UP

## 2024-06-05 ENCOUNTER — INPATIENT (INPATIENT)
Facility: HOSPITAL | Age: 74
LOS: 1 days | Discharge: HOME | DRG: 948 | End: 2024-06-07
Attending: HOSPITALIST | Admitting: STUDENT IN AN ORGANIZED HEALTH CARE EDUCATION/TRAINING PROGRAM
Payer: MEDICARE

## 2024-06-05 VITALS
SYSTOLIC BLOOD PRESSURE: 133 MMHG | TEMPERATURE: 98 F | RESPIRATION RATE: 22 BRPM | DIASTOLIC BLOOD PRESSURE: 71 MMHG | HEART RATE: 85 BPM | OXYGEN SATURATION: 93 %

## 2024-06-05 DIAGNOSIS — R41.82 ALTERED MENTAL STATUS, UNSPECIFIED: ICD-10-CM

## 2024-06-05 LAB
ALBUMIN SERPL ELPH-MCNC: 4.1 G/DL — SIGNIFICANT CHANGE UP (ref 3.3–5.2)
ALP SERPL-CCNC: 89 U/L — SIGNIFICANT CHANGE UP (ref 40–120)
ALT FLD-CCNC: 12 U/L — SIGNIFICANT CHANGE UP
AMPHET UR-MCNC: NEGATIVE — SIGNIFICANT CHANGE UP
ANION GAP SERPL CALC-SCNC: 16 MMOL/L — SIGNIFICANT CHANGE UP (ref 5–17)
ANISOCYTOSIS BLD QL: SLIGHT — SIGNIFICANT CHANGE UP
APAP SERPL-MCNC: 5.8 UG/ML — LOW (ref 10–26)
APPEARANCE UR: CLEAR — SIGNIFICANT CHANGE UP
AST SERPL-CCNC: 17 U/L — SIGNIFICANT CHANGE UP
BACTERIA # UR AUTO: NEGATIVE /HPF — SIGNIFICANT CHANGE UP
BARBITURATES UR SCN-MCNC: NEGATIVE — SIGNIFICANT CHANGE UP
BASOPHILS # BLD AUTO: 0 K/UL — SIGNIFICANT CHANGE UP (ref 0–0.2)
BASOPHILS NFR BLD AUTO: 0 % — SIGNIFICANT CHANGE UP (ref 0–2)
BENZODIAZ UR-MCNC: NEGATIVE — SIGNIFICANT CHANGE UP
BILIRUB SERPL-MCNC: 0.3 MG/DL — LOW (ref 0.4–2)
BILIRUB UR-MCNC: NEGATIVE — SIGNIFICANT CHANGE UP
BUN SERPL-MCNC: 9.1 MG/DL — SIGNIFICANT CHANGE UP (ref 8–20)
CALCIUM SERPL-MCNC: 9.4 MG/DL — SIGNIFICANT CHANGE UP (ref 8.4–10.5)
CAST: 5 /LPF — HIGH (ref 0–4)
CHLORIDE SERPL-SCNC: 101 MMOL/L — SIGNIFICANT CHANGE UP (ref 96–108)
CO2 SERPL-SCNC: 21 MMOL/L — LOW (ref 22–29)
COCAINE METAB.OTHER UR-MCNC: NEGATIVE — SIGNIFICANT CHANGE UP
COLOR SPEC: YELLOW — SIGNIFICANT CHANGE UP
CREAT SERPL-MCNC: 0.57 MG/DL — SIGNIFICANT CHANGE UP (ref 0.5–1.3)
DIFF PNL FLD: NEGATIVE — SIGNIFICANT CHANGE UP
EGFR: 95 ML/MIN/1.73M2 — SIGNIFICANT CHANGE UP
EOSINOPHIL # BLD AUTO: 0 K/UL — SIGNIFICANT CHANGE UP (ref 0–0.5)
EOSINOPHIL NFR BLD AUTO: 0 % — SIGNIFICANT CHANGE UP (ref 0–6)
ETHANOL SERPL-MCNC: <10 MG/DL — SIGNIFICANT CHANGE UP (ref 0–9)
FENTANYL UR QL SCN: NEGATIVE — SIGNIFICANT CHANGE UP
GLUCOSE SERPL-MCNC: 175 MG/DL — HIGH (ref 70–99)
GLUCOSE UR QL: >=1000 MG/DL
HCT VFR BLD CALC: 39 % — SIGNIFICANT CHANGE UP (ref 34.5–45)
HGB BLD-MCNC: 12.3 G/DL — SIGNIFICANT CHANGE UP (ref 11.5–15.5)
KETONES UR-MCNC: 40 MG/DL
LEUKOCYTE ESTERASE UR-ACNC: NEGATIVE — SIGNIFICANT CHANGE UP
LYMPHOCYTES # BLD AUTO: 0.42 K/UL — LOW (ref 1–3.3)
LYMPHOCYTES # BLD AUTO: 4.4 % — LOW (ref 13–44)
MANUAL SMEAR VERIFICATION: SIGNIFICANT CHANGE UP
MCHC RBC-ENTMCNC: 25.1 PG — LOW (ref 27–34)
MCHC RBC-ENTMCNC: 31.5 GM/DL — LOW (ref 32–36)
MCV RBC AUTO: 79.4 FL — LOW (ref 80–100)
METHADONE UR-MCNC: NEGATIVE — SIGNIFICANT CHANGE UP
MICROCYTES BLD QL: SLIGHT — SIGNIFICANT CHANGE UP
MONOCYTES # BLD AUTO: 0.41 K/UL — SIGNIFICANT CHANGE UP (ref 0–0.9)
MONOCYTES NFR BLD AUTO: 4.3 % — SIGNIFICANT CHANGE UP (ref 2–14)
NEUTROPHILS # BLD AUTO: 8.7 K/UL — HIGH (ref 1.8–7.4)
NEUTROPHILS NFR BLD AUTO: 91.3 % — HIGH (ref 43–77)
NITRITE UR-MCNC: NEGATIVE — SIGNIFICANT CHANGE UP
OPIATES UR-MCNC: NEGATIVE — SIGNIFICANT CHANGE UP
OVALOCYTES BLD QL SMEAR: SLIGHT — SIGNIFICANT CHANGE UP
PCP SPEC-MCNC: SIGNIFICANT CHANGE UP
PCP UR-MCNC: NEGATIVE — SIGNIFICANT CHANGE UP
PH UR: 5.5 — SIGNIFICANT CHANGE UP (ref 5–8)
PLAT MORPH BLD: NORMAL — SIGNIFICANT CHANGE UP
PLATELET # BLD AUTO: 213 K/UL — SIGNIFICANT CHANGE UP (ref 150–400)
POIKILOCYTOSIS BLD QL AUTO: SLIGHT — SIGNIFICANT CHANGE UP
POLYCHROMASIA BLD QL SMEAR: SLIGHT — SIGNIFICANT CHANGE UP
POTASSIUM SERPL-MCNC: 3.5 MMOL/L — SIGNIFICANT CHANGE UP (ref 3.5–5.3)
POTASSIUM SERPL-SCNC: 3.5 MMOL/L — SIGNIFICANT CHANGE UP (ref 3.5–5.3)
PROT SERPL-MCNC: 6.4 G/DL — LOW (ref 6.6–8.7)
PROT UR-MCNC: 30 MG/DL
RBC # BLD: 4.91 M/UL — SIGNIFICANT CHANGE UP (ref 3.8–5.2)
RBC # FLD: 22.2 % — HIGH (ref 10.3–14.5)
RBC BLD AUTO: ABNORMAL
RBC CASTS # UR COMP ASSIST: 1 /HPF — SIGNIFICANT CHANGE UP (ref 0–4)
SALICYLATES SERPL-MCNC: <0.6 MG/DL — LOW (ref 10–20)
SODIUM SERPL-SCNC: 138 MMOL/L — SIGNIFICANT CHANGE UP (ref 135–145)
SP GR SPEC: >1.03 — HIGH (ref 1–1.03)
SQUAMOUS # UR AUTO: 1 /HPF — SIGNIFICANT CHANGE UP (ref 0–5)
THC UR QL: POSITIVE
UROBILINOGEN FLD QL: 0.2 MG/DL — SIGNIFICANT CHANGE UP (ref 0.2–1)
WBC # BLD: 9.53 K/UL — SIGNIFICANT CHANGE UP (ref 3.8–10.5)
WBC # FLD AUTO: 9.53 K/UL — SIGNIFICANT CHANGE UP (ref 3.8–10.5)
WBC UR QL: 2 /HPF — SIGNIFICANT CHANGE UP (ref 0–5)

## 2024-06-05 PROCEDURE — 93010 ELECTROCARDIOGRAM REPORT: CPT

## 2024-06-05 PROCEDURE — 99285 EMERGENCY DEPT VISIT HI MDM: CPT | Mod: GC

## 2024-06-05 PROCEDURE — 71045 X-RAY EXAM CHEST 1 VIEW: CPT | Mod: 26

## 2024-06-05 PROCEDURE — 99223 1ST HOSP IP/OBS HIGH 75: CPT

## 2024-06-05 PROCEDURE — 70450 CT HEAD/BRAIN W/O DYE: CPT | Mod: 26,MC

## 2024-06-05 PROCEDURE — 70450 CT HEAD/BRAIN W/O DYE: CPT | Mod: 26,MC,77

## 2024-06-05 RX ORDER — ACETAMINOPHEN 500 MG
650 TABLET ORAL EVERY 6 HOURS
Refills: 0 | Status: DISCONTINUED | OUTPATIENT
Start: 2024-06-05 | End: 2024-06-07

## 2024-06-05 RX ORDER — LANOLIN ALCOHOL/MO/W.PET/CERES
3 CREAM (GRAM) TOPICAL AT BEDTIME
Refills: 0 | Status: DISCONTINUED | OUTPATIENT
Start: 2024-06-05 | End: 2024-06-07

## 2024-06-05 RX ORDER — SODIUM CHLORIDE 9 MG/ML
3 INJECTION INTRAMUSCULAR; INTRAVENOUS; SUBCUTANEOUS EVERY 8 HOURS
Refills: 0 | Status: DISCONTINUED | OUTPATIENT
Start: 2024-06-05 | End: 2024-06-07

## 2024-06-05 RX ORDER — ONDANSETRON 8 MG/1
4 TABLET, FILM COATED ORAL EVERY 8 HOURS
Refills: 0 | Status: DISCONTINUED | OUTPATIENT
Start: 2024-06-05 | End: 2024-06-07

## 2024-06-05 RX ORDER — SODIUM CHLORIDE 9 MG/ML
1000 INJECTION INTRAMUSCULAR; INTRAVENOUS; SUBCUTANEOUS
Refills: 0 | Status: DISCONTINUED | OUTPATIENT
Start: 2024-06-05 | End: 2024-06-07

## 2024-06-05 RX ORDER — HEPARIN SODIUM 5000 [USP'U]/ML
5000 INJECTION INTRAVENOUS; SUBCUTANEOUS EVERY 12 HOURS
Refills: 0 | Status: DISCONTINUED | OUTPATIENT
Start: 2024-06-05 | End: 2024-06-07

## 2024-06-05 NOTE — ED PROVIDER NOTE - OBJECTIVE STATEMENT
Event Note 73-year-old female with history of HTN, HLD, DM, schizophrenia BIBEMS presenting with altered mental status. Patient was last seen well at 8 AM this morning with baseline mental status of alert and oriented x 4.  Patient was found on floor with unknown downtime.  Alert and oriented x 2 on emergency department arrival.  Small abrasion noted above left eye.  Patient moving all extremities without focal deficit.  Patient denies any symptoms.

## 2024-06-05 NOTE — ED ADULT NURSE NOTE - OBJECTIVE STATEMENT
Pt comes in BIBEMS LKW 8:00AM found on floor this morning after making breakfast by her boyfriend. Pt normally A&Ox4 now A&Ox2 oriented to place and time. Pt normally physically active. MD Lagunas called to bedside. Priority CT called. Pt unable to follow commands in CT scan.

## 2024-06-05 NOTE — ED ADULT TRIAGE NOTE - CHIEF COMPLAINT QUOTE
pt A&Ox2- oriented to self and place c/o abd pain... as per EMS pt normally A&OX4 and was LKW was 8am when she was making breakfast and after was found on floor next her bed with AMS

## 2024-06-05 NOTE — ED PROVIDER NOTE - ATTENDING CONTRIBUTION TO CARE
Elderly F presents with ams  signs of head trauma on exam, lungs clear abd soft  imaging and labs r/o acute inj and derangement

## 2024-06-05 NOTE — ED ADULT NURSE NOTE - NSFALLRISKINTERV_ED_ALL_ED

## 2024-06-05 NOTE — ED ADULT NURSE REASSESSMENT NOTE - NS ED NURSE REASSESS COMMENT FT1
A 2 RN skin check has been performed on admission to CDU with RN witness April.  A pressure injury was not identified.  Documentation in the assessment to support findings.

## 2024-06-05 NOTE — ED PROVIDER NOTE - CLINICAL SUMMARY MEDICAL DECISION MAKING FREE TEXT BOX
73-year-old female with altered mental status with known schizophrenia.  Alert and oriented x 2, small abrasion above left orbit, hemodynamically stable, no acute respiratory distress, moving all extremities without focal deficit.  Differential includes ICH, electrolyte derangement, UTI. 73-year-old female with altered mental status with known schizophrenia.  Alert and oriented x 2, small abrasion above left orbit, hemodynamically stable, no acute respiratory distress, moving all extremities without focal deficit.  Differential includes ICH, electrolyte derangement, UTI. Labs and imaging independently reviewed and interpreted with no acute pathology noted.   Patient remains altered to baseline.  Medicine admission for metabolic encephalopathy.

## 2024-06-05 NOTE — ED PROVIDER NOTE - PHYSICAL EXAMINATION
General: alert, A&Ox2  Head: Normocephalic,  hematoma above left orbit  Eyes: PERRLA, no conjunctival injection, no scleral icterus, EOMI  ENMT: Atraumatic external nose and ears  Neck: Soft and supple, full ROM without pain, no midline tenderness, no thyromegaly,  no lymphadenopathy  Cardiac: Regular rate and regular rhythm.  Resp: Unlabored respiratory effort, lungs CTAB  Abd: Soft, non-tender, non-distended  MSK: Spine midline and non-tender  Skin: Warm and dry  Neuro: AO x 2, moves all extremities symmetrically, Motor strength and sensation grossly intact

## 2024-06-05 NOTE — ED ADULT NURSE REASSESSMENT NOTE - NS ED NURSE REASSESS COMMENT FT1
Pt care assumed @ 1515. Report received from Brian FORREST. Pt resting in bed. VSS. Pt is currently A&Ox3 disoriented to time. Awaiting results at this time. Pt updated on plan of care and verbalizes understanding. Pt offers not complaints at this time

## 2024-06-05 NOTE — ED ADULT NURSE REASSESSMENT NOTE - NS ED NURSE REASSESS COMMENT FT1
Report received from off-going RN at 19:00, VSS, pt resting comfortably in stretcher. Respirations even and unlabored. Patient safety maintained. CM and  ongoing.

## 2024-06-05 NOTE — ED PROVIDER NOTE - PROGRESS NOTE DETAILS
Stable on reassessment however patient remains alert and oriented x 2.  Patient thinks it is 1942 but states that Jaziel is the president.  Spoke with significant other at home (Niles) who states that patient is usually alert and oriented x 4 with no altered mental status.  Patient's still moving all extremities without any deficits.  Spoke with hospitalist for admission. -Bertin,

## 2024-06-06 DIAGNOSIS — R55 SYNCOPE AND COLLAPSE: ICD-10-CM

## 2024-06-06 LAB
ANION GAP SERPL CALC-SCNC: 16 MMOL/L — SIGNIFICANT CHANGE UP (ref 5–17)
BUN SERPL-MCNC: 7.1 MG/DL — LOW (ref 8–20)
CALCIUM SERPL-MCNC: 9.2 MG/DL — SIGNIFICANT CHANGE UP (ref 8.4–10.5)
CHLORIDE SERPL-SCNC: 102 MMOL/L — SIGNIFICANT CHANGE UP (ref 96–108)
CO2 SERPL-SCNC: 23 MMOL/L — SIGNIFICANT CHANGE UP (ref 22–29)
CREAT SERPL-MCNC: 0.47 MG/DL — LOW (ref 0.5–1.3)
CULTURE RESULTS: NO GROWTH — SIGNIFICANT CHANGE UP
EGFR: 100 ML/MIN/1.73M2 — SIGNIFICANT CHANGE UP
GLUCOSE BLDC GLUCOMTR-MCNC: 107 MG/DL — HIGH (ref 70–99)
GLUCOSE BLDC GLUCOMTR-MCNC: 137 MG/DL — HIGH (ref 70–99)
GLUCOSE BLDC GLUCOMTR-MCNC: 142 MG/DL — HIGH (ref 70–99)
GLUCOSE BLDC GLUCOMTR-MCNC: 92 MG/DL — SIGNIFICANT CHANGE UP (ref 70–99)
GLUCOSE SERPL-MCNC: 82 MG/DL — SIGNIFICANT CHANGE UP (ref 70–99)
HCT VFR BLD CALC: 40.6 % — SIGNIFICANT CHANGE UP (ref 34.5–45)
HGB BLD-MCNC: 12.4 G/DL — SIGNIFICANT CHANGE UP (ref 11.5–15.5)
MAGNESIUM SERPL-MCNC: 1.6 MG/DL — SIGNIFICANT CHANGE UP (ref 1.6–2.6)
MCHC RBC-ENTMCNC: 24.4 PG — LOW (ref 27–34)
MCHC RBC-ENTMCNC: 30.5 GM/DL — LOW (ref 32–36)
MCV RBC AUTO: 79.9 FL — LOW (ref 80–100)
PHOSPHATE SERPL-MCNC: 2.7 MG/DL — SIGNIFICANT CHANGE UP (ref 2.4–4.7)
PLATELET # BLD AUTO: 202 K/UL — SIGNIFICANT CHANGE UP (ref 150–400)
POTASSIUM SERPL-MCNC: 3.2 MMOL/L — LOW (ref 3.5–5.3)
POTASSIUM SERPL-SCNC: 3.2 MMOL/L — LOW (ref 3.5–5.3)
RBC # BLD: 5.08 M/UL — SIGNIFICANT CHANGE UP (ref 3.8–5.2)
RBC # FLD: 22.5 % — HIGH (ref 10.3–14.5)
SODIUM SERPL-SCNC: 141 MMOL/L — SIGNIFICANT CHANGE UP (ref 135–145)
SPECIMEN SOURCE: SIGNIFICANT CHANGE UP
TROPONIN T, HIGH SENSITIVITY RESULT: 17 NG/L — SIGNIFICANT CHANGE UP (ref 0–51)
TSH SERPL-MCNC: 2.06 UIU/ML — SIGNIFICANT CHANGE UP (ref 0.27–4.2)
WBC # BLD: 8.24 K/UL — SIGNIFICANT CHANGE UP (ref 3.8–10.5)
WBC # FLD AUTO: 8.24 K/UL — SIGNIFICANT CHANGE UP (ref 3.8–10.5)

## 2024-06-06 PROCEDURE — 99223 1ST HOSP IP/OBS HIGH 75: CPT

## 2024-06-06 PROCEDURE — 93010 ELECTROCARDIOGRAM REPORT: CPT

## 2024-06-06 PROCEDURE — 99233 SBSQ HOSP IP/OBS HIGH 50: CPT

## 2024-06-06 PROCEDURE — 93306 TTE W/DOPPLER COMPLETE: CPT | Mod: 26

## 2024-06-06 RX ORDER — ARIPIPRAZOLE 15 MG/1
15 TABLET ORAL DAILY
Refills: 0 | Status: DISCONTINUED | OUTPATIENT
Start: 2024-06-06 | End: 2024-06-06

## 2024-06-06 RX ORDER — DEXTROSE 10 % IN WATER 10 %
125 INTRAVENOUS SOLUTION INTRAVENOUS ONCE
Refills: 0 | Status: DISCONTINUED | OUTPATIENT
Start: 2024-06-06 | End: 2024-06-07

## 2024-06-06 RX ORDER — DEXTROSE 50 % IN WATER 50 %
25 SYRINGE (ML) INTRAVENOUS ONCE
Refills: 0 | Status: DISCONTINUED | OUTPATIENT
Start: 2024-06-06 | End: 2024-06-07

## 2024-06-06 RX ORDER — SODIUM CHLORIDE 9 MG/ML
1000 INJECTION, SOLUTION INTRAVENOUS
Refills: 0 | Status: DISCONTINUED | OUTPATIENT
Start: 2024-06-06 | End: 2024-06-07

## 2024-06-06 RX ORDER — GLUCAGON INJECTION, SOLUTION 0.5 MG/.1ML
1 INJECTION, SOLUTION SUBCUTANEOUS ONCE
Refills: 0 | Status: DISCONTINUED | OUTPATIENT
Start: 2024-06-06 | End: 2024-06-07

## 2024-06-06 RX ORDER — INSULIN GLARGINE 100 [IU]/ML
10 INJECTION, SOLUTION SUBCUTANEOUS AT BEDTIME
Refills: 0 | Status: DISCONTINUED | OUTPATIENT
Start: 2024-06-06 | End: 2024-06-07

## 2024-06-06 RX ORDER — INSULIN LISPRO 100/ML
VIAL (ML) SUBCUTANEOUS
Refills: 0 | Status: DISCONTINUED | OUTPATIENT
Start: 2024-06-06 | End: 2024-06-07

## 2024-06-06 RX ORDER — DEXTROSE 50 % IN WATER 50 %
15 SYRINGE (ML) INTRAVENOUS ONCE
Refills: 0 | Status: DISCONTINUED | OUTPATIENT
Start: 2024-06-06 | End: 2024-06-07

## 2024-06-06 RX ORDER — INSULIN LISPRO 100/ML
2 VIAL (ML) SUBCUTANEOUS
Refills: 0 | Status: DISCONTINUED | OUTPATIENT
Start: 2024-06-06 | End: 2024-06-07

## 2024-06-06 RX ORDER — METFORMIN HYDROCHLORIDE 850 MG/1
2 TABLET ORAL
Refills: 0 | DISCHARGE

## 2024-06-06 RX ORDER — MAGNESIUM SULFATE 500 MG/ML
2 VIAL (ML) INJECTION ONCE
Refills: 0 | Status: COMPLETED | OUTPATIENT
Start: 2024-06-06 | End: 2024-06-06

## 2024-06-06 RX ORDER — ARIPIPRAZOLE 15 MG/1
1 TABLET ORAL
Refills: 0 | DISCHARGE

## 2024-06-06 RX ORDER — DEXTROSE 50 % IN WATER 50 %
12.5 SYRINGE (ML) INTRAVENOUS ONCE
Refills: 0 | Status: DISCONTINUED | OUTPATIENT
Start: 2024-06-06 | End: 2024-06-07

## 2024-06-06 RX ORDER — EMPAGLIFLOZIN 10 MG/1
1 TABLET, FILM COATED ORAL
Refills: 0 | DISCHARGE

## 2024-06-06 RX ORDER — SODIUM,POTASSIUM PHOSPHATES 278-250MG
1 POWDER IN PACKET (EA) ORAL ONCE
Refills: 0 | Status: COMPLETED | OUTPATIENT
Start: 2024-06-06 | End: 2024-06-06

## 2024-06-06 RX ORDER — ARIPIPRAZOLE 15 MG/1
30 TABLET ORAL DAILY
Refills: 0 | Status: DISCONTINUED | OUTPATIENT
Start: 2024-06-06 | End: 2024-06-07

## 2024-06-06 RX ADMIN — ARIPIPRAZOLE 30 MILLIGRAM(S): 15 TABLET ORAL at 18:11

## 2024-06-06 RX ADMIN — HEPARIN SODIUM 5000 UNIT(S): 5000 INJECTION INTRAVENOUS; SUBCUTANEOUS at 05:44

## 2024-06-06 RX ADMIN — Medication 2 UNIT(S): at 17:13

## 2024-06-06 RX ADMIN — SODIUM CHLORIDE 100 MILLILITER(S): 9 INJECTION, SOLUTION INTRAVENOUS at 11:05

## 2024-06-06 RX ADMIN — Medication 25 GRAM(S): at 09:08

## 2024-06-06 RX ADMIN — HEPARIN SODIUM 5000 UNIT(S): 5000 INJECTION INTRAVENOUS; SUBCUTANEOUS at 17:37

## 2024-06-06 RX ADMIN — SODIUM CHLORIDE 3 MILLILITER(S): 9 INJECTION INTRAMUSCULAR; INTRAVENOUS; SUBCUTANEOUS at 05:44

## 2024-06-06 RX ADMIN — SODIUM CHLORIDE 3 MILLILITER(S): 9 INJECTION INTRAMUSCULAR; INTRAVENOUS; SUBCUTANEOUS at 12:58

## 2024-06-06 RX ADMIN — Medication 1 PACKET(S): at 09:38

## 2024-06-06 RX ADMIN — SODIUM CHLORIDE 3 MILLILITER(S): 9 INJECTION INTRAMUSCULAR; INTRAVENOUS; SUBCUTANEOUS at 22:06

## 2024-06-06 RX ADMIN — INSULIN GLARGINE 10 UNIT(S): 100 INJECTION, SOLUTION SUBCUTANEOUS at 22:10

## 2024-06-06 NOTE — H&P ADULT - ASSESSMENT
72 y/o female with syncopal episode at home, Hx of Schizoaffective disorder, HTN, HLD, DM-2, Hypothyroidism, THC use    Syncope:      Schizoaffective disorder:      HTN:      HLD:      DM-2:      Hypothyroidism:      THC use:      Discussed with ED staff, Patient, Partner via telephone with patients permission     72 y/o female with syncopal episode at home, Hx of Schizoaffective disorder, HTN, HLD, DM-2, Hypothyroidism, THC use    Syncope:  -No clear etiology identified in history at this time  -EKG unchanged from prior  -Labs/exam/UA c/w dehydration, Orthostasis?   -Cont. on tele, check orthostatics  -IVF  -2D echo  -Hold BP regimen  -Confirm meds in AM, Partner states Son will bring in  -Fall risk  -DVT-P with VC boots/Sub Q heparin    Schizoaffective disorder:  -Resume o/p regimen pending confirmation of meds in AM  -Denies SI/HI    HTN:  -Hold as above  -DASH diet    HLD:  -Cardiac diet     DM-2:  -Hold PO meds  -Basal/bolus regimen  -ADA diet     Hypothyroidism:  -Resume synthroid     THC use:  -Counseled on cessation     Discussed with ED staff, Patient, Partner via telephone with patients permission

## 2024-06-06 NOTE — BH CONSULTATION LIAISON ASSESSMENT NOTE - SUMMARY
A:     Patient is a 73 y.o F, lives in [place], , with PMH of HTN, HLD, DM, and Past Psych Hx of schizoaffective disorder, per chart review 1 hx IP Psych in 80's, current cannabis use, no known hx SA or self harm, most recent meds appear to be abilify 15 mg qD, perphenazine 2 mg QHS, unsure if compliant, follows with [facility] for therapy and psychiatry, presented to ER for AMS and syncopal episode.      CL Psychiatry consulted for syncopal episode     Pt presents with syncopal episode, with hx of at least 3 syncopal episodes. Syncope w/u to be performed. Pt follows with [facility] OP, appears stable psychiatrically per collateral and chart review on current meds. Is on two antipsychotics, and would benefit from consolidation to single antipsychotic, but will defer to OP provider. Otherwise, there are no acute psychiatric concerns nor psychiatric explanations for pt's presentation. Denies SI, HI, AVH. Pt agreeable with plan.     P:     -Continue syncope work up per primary team   -Continue home meds - does appear to be on two antipsychotics (i.e. abilify and perphenazine), consider discontinuing perphenazine, but will defer to OP provider for actual changes   -Pt counseled on cannabis use and that it may be contributing to her mental status changes and also limiting the efficacy of her medications   -No need for 1:1, no need for IP psych at this time - there is no acute psychiatric concern or explanation for pt's current presentation and symptoms   -Will sign off at this time, please re consult as needed

## 2024-06-06 NOTE — H&P ADULT - TIME BILLING
Reviewing prior medical record, EMS events, ED course, independently reviewing labs/imaging studies/EKG/and CXR, discussing case with ED attending, examining patient, furnishing H&P, orders, doing medication reconciliation, discussing plan of care with Patient/Partner/ED staff and answering all their questions.

## 2024-06-06 NOTE — PHYSICAL THERAPY INITIAL EVALUATION ADULT - PERTINENT HX OF CURRENT PROBLEM, REHAB EVAL
74 y/o r, PMH:  Schizoaffective disorder, DM-2, HLD, HTN, Hypothyroidism, gait disturbance using a cane at baseline, and THC use who lives at home with Partner and Son as well as an Aide who helps both her and her Partner.  She is not sure what medications she takes, and the Family gave a list to the EMS, however the list is unable to be found at this time.

## 2024-06-06 NOTE — BH CONSULTATION LIAISON ASSESSMENT NOTE - NSBHCHARTREVIEWLAB_PSY_A_CORE FT
CBC Full  -  ( 06 Jun 2024 03:02 )  WBC Count : 8.24 K/uL  RBC Count : 5.08 M/uL  Hemoglobin : 12.4 g/dL  Hematocrit : 40.6 %  Platelet Count - Automated : 202 K/uL  Mean Cell Volume : 79.9 fl  Mean Cell Hemoglobin : 24.4 pg  Mean Cell Hemoglobin Concentration : 30.5 gm/dL  Auto Neutrophil # : x  Auto Lymphocyte # : x  Auto Monocyte # : x  Auto Eosinophil # : x  Auto Basophil # : x  Auto Neutrophil % : x  Auto Lymphocyte % : x  Auto Monocyte % : x  Auto Eosinophil % : x  Auto Basophil % : x

## 2024-06-06 NOTE — BH CONSULTATION LIAISON ASSESSMENT NOTE - RISK ASSESSMENT
Risk Factors:   - hx of substance use   - hx of inpatient hospitalization   - hx of psychiatric diagnosis     Protective Factors   - compliance with treatment   - followed by outpatient psychiatry and psychotherapy   - good support system from family and/or friends   - domiciled   - employed, financially stable  - no hx of SA or NSSIB   - no hx of violent behaviors

## 2024-06-06 NOTE — BH CONSULTATION LIAISON ASSESSMENT NOTE - CURRENT MEDICATION
MEDICATIONS  (STANDING):  ARIPiprazole 15 milliGRAM(s) Oral daily  dextrose 10% Bolus 125 milliLiter(s) IV Bolus once  dextrose 5%. 1000 milliLiter(s) (100 mL/Hr) IV Continuous <Continuous>  dextrose 5%. 1000 milliLiter(s) (50 mL/Hr) IV Continuous <Continuous>  dextrose 50% Injectable 25 Gram(s) IV Push once  dextrose 50% Injectable 12.5 Gram(s) IV Push once  glucagon  Injectable 1 milliGRAM(s) IntraMuscular once  heparin   Injectable 5000 Unit(s) SubCutaneous every 12 hours  insulin glargine Injectable (LANTUS) 10 Unit(s) SubCutaneous at bedtime  insulin lispro (ADMELOG) corrective regimen sliding scale   SubCutaneous Before meals and at bedtime  insulin lispro Injectable (ADMELOG) 2 Unit(s) SubCutaneous before dinner  insulin lispro Injectable (ADMELOG) 2 Unit(s) SubCutaneous before breakfast  insulin lispro Injectable (ADMELOG) 2 Unit(s) SubCutaneous before lunch  lactated ringers. 1000 milliLiter(s) (100 mL/Hr) IV Continuous <Continuous>  sodium chloride 0.9% lock flush 3 milliLiter(s) IV Push every 8 hours  sodium chloride 0.9%. 1000 milliLiter(s) (125 mL/Hr) IV Continuous <Continuous>    MEDICATIONS  (PRN):  acetaminophen     Tablet .. 650 milliGRAM(s) Oral every 6 hours PRN Temp greater or equal to 38C (100.4F), Mild Pain (1 - 3)  aluminum hydroxide/magnesium hydroxide/simethicone Suspension 30 milliLiter(s) Oral every 4 hours PRN Dyspepsia  dextrose Oral Gel 15 Gram(s) Oral once PRN Blood Glucose LESS THAN 70 milliGRAM(s)/deciliter  melatonin 3 milliGRAM(s) Oral at bedtime PRN Insomnia  ondansetron Injectable 4 milliGRAM(s) IV Push every 8 hours PRN Nausea and/or Vomiting

## 2024-06-06 NOTE — BH CONSULTATION LIAISON ASSESSMENT NOTE - NSBHCHARTREVIEWVS_PSY_A_CORE FT
Vital Signs Last 24 Hrs  T(C): 36.4 (06 Jun 2024 08:12), Max: 36.9 (05 Jun 2024 19:26)  T(F): 97.6 (06 Jun 2024 08:12), Max: 98.5 (05 Jun 2024 19:26)  HR: 75 (06 Jun 2024 11:22) (66 - 86)  BP: 120/62 (06 Jun 2024 11:22) (109/60 - 128/78)  BP(mean): 94 (06 Jun 2024 01:20) (94 - 94)  RR: 18 (06 Jun 2024 11:22) (18 - 21)  SpO2: 96% (06 Jun 2024 11:22) (96% - 100%)    Parameters below as of 06 Jun 2024 11:22  Patient On (Oxygen Delivery Method): room air

## 2024-06-06 NOTE — CONSULT NOTE ADULT - ASSESSMENT
72 y/o r, PMH:  Schizoaffective disorder, DM-2, HLD, HTN, Hypothyroidism, gait disturbance using a cane at baseline, and THC use who lives at home with Partner and Son as well as an Aide who helps both her and her Partner.   History obtained from chart review, Patient, and Partner Akil at [phone].   Patient was in her usual state of health, when she had a syncopal episode at home while in her kitchen. No reported illnesses, sick contacts, or travel. She is not sure what medications she takes, and the Family gave a list to the EMS, however the list is unable to be found at this time.   A cardiac consult was called for syncope. In the ED she was initially a code stroke due to lack of information,  CT head negative,   Patient does not see cardiology, former smoker, takes THC nightly to help with sleep, and has no family hisotyr of heart disease  Orthostatic negative for changes.   states she only remembers going to bed last night then woke up in the hospital   Denies headache dizziness, chest pain, palpitations, sob, pnd, orthopnea, hemoptysis, n/v/d/c/abd pain, cramps, weight gain or loss, numbing adn tingling or any other discomfort.   
The patient is a 73y Female who is followed by neurology because of AMS and syncope    AMS  question if related psychiatric disease  She was fully oriented during my exam  will continue to follow    Syncope  she has had multiple syncopal events recently  unclear etiology  did not sound seizure like  Echo as above  agree with telemetry and extended heart monitor for arrhythmia detection post discharge    will follow with you    Kojo Nicole MD PhD   [id number]

## 2024-06-06 NOTE — CONSULT NOTE ADULT - SUBJECTIVE AND OBJECTIVE BOX
[facility]                                     [facility] [place]                                 Corey Bueno, & Brian                                  [address]                                        [place], [place], [address]                                             [phone]    CC: syncope  HPI:  The patient is a 73y Female who presented with possible syncope.  She says that she was told by her boyfriend that she went out of bed and laid down on floor, she denies LOC.  She said that she had three syncopal episodes since the beginning of the year. She also had confusion, but to me is AAOx3.  Per chart she had the syncopal episode in her kitchen.  Neurology is asked to consult.    PAST MEDICAL & SURGICAL HISTORY:  DM (diabetes mellitus)      HTN (hypertension)      HLD (hyperlipidemia)      Manic depression      Schizoaffective disorder      Hypothyroid      No significant past surgical history      MEDICATIONS  (STANDING):  ARIPiprazole 30 milliGRAM(s) Oral daily  dextrose 10% Bolus 125 milliLiter(s) IV Bolus once  dextrose 5%. 1000 milliLiter(s) (50 mL/Hr) IV Continuous <Continuous>  dextrose 5%. 1000 milliLiter(s) (100 mL/Hr) IV Continuous <Continuous>  dextrose 50% Injectable 12.5 Gram(s) IV Push once  dextrose 50% Injectable 25 Gram(s) IV Push once  glucagon  Injectable 1 milliGRAM(s) IntraMuscular once  heparin   Injectable 5000 Unit(s) SubCutaneous every 12 hours  insulin glargine Injectable (LANTUS) 10 Unit(s) SubCutaneous at bedtime  insulin lispro (ADMELOG) corrective regimen sliding scale   SubCutaneous Before meals and at bedtime  insulin lispro Injectable (ADMELOG) 2 Unit(s) SubCutaneous before breakfast  insulin lispro Injectable (ADMELOG) 2 Unit(s) SubCutaneous before lunch  insulin lispro Injectable (ADMELOG) 2 Unit(s) SubCutaneous before dinner  lactated ringers. 1000 milliLiter(s) (100 mL/Hr) IV Continuous <Continuous>  sodium chloride 0.9% lock flush 3 milliLiter(s) IV Push every 8 hours  sodium chloride 0.9%. 1000 milliLiter(s) (125 mL/Hr) IV Continuous <Continuous>    MEDICATIONS  (PRN):  acetaminophen     Tablet .. 650 milliGRAM(s) Oral every 6 hours PRN Temp greater or equal to 38C (100.4F), Mild Pain (1 - 3)  aluminum hydroxide/magnesium hydroxide/simethicone Suspension 30 milliLiter(s) Oral every 4 hours PRN Dyspepsia  dextrose Oral Gel 15 Gram(s) Oral once PRN Blood Glucose LESS THAN 70 milliGRAM(s)/deciliter  melatonin 3 milliGRAM(s) Oral at bedtime PRN Insomnia  ondansetron Injectable 4 milliGRAM(s) IV Push every 8 hours PRN Nausea and/or Vomiting      Allergies    Allergy Status Unknown    Intolerances    SOCIAL HISTORY:  no tob,   no alcohol    (+) drugs- vapes THC    FAMILY HISTORY:  No pertinent family history in first degree relatives      ROS: 14 point ROS negative other than what is present in HPI or below    Vital Signs Last 24 Hrs  T(C): 36.3 (06 Jun 2024 15:48), Max: 36.9 (05 Jun 2024 19:26)  T(F): 97.4 (06 Jun 2024 15:48), Max: 98.5 (05 Jun 2024 19:26)  HR: 77 (06 Jun 2024 15:48) (66 - 86)  BP: 137/64 (06 Jun 2024 15:48) (109/60 - 137/64)  BP(mean): 94 (06 Jun 2024 01:20) (94 - 94)  RR: 18 (06 Jun 2024 15:48) (18 - 20)  SpO2: 96% (06 Jun 2024 15:48) (96% - 100%)    Parameters below as of 06 Jun 2024 15:48  Patient On (Oxygen Delivery Method): room air          General: NAD    Detailed Neurologic Exam:    Mental status: The patient is awake and alert and has normal attention span.  The patient is fully oriented in 3 spheres.  The patient is able to name objects, follow commands, repeat sentences.    Cranial nerves: Pupils equal and react symmetrically to light. There is no visual field deficit to confrontation. Extraocular motion is full with no nystagmus. There is no ptosis. Facial sensation is intact. Facial musculature is symmetric. Palate elevates symmetrically. Tongue is midline.    Motor: There is normal bulk and tone.  There is no tremor.  Strength is 5/5 in the right arm and leg.   Strength is 5/5 in the left arm and leg.    Sensation: Intact to light touch and pin in 4 extremities    Reflexes: 1+ biceps/BR/patellar DTR     Cerebellar: There is no dysmetria on finger to nose testing.    Gait : deferred    LABS:                         12.4   8.24  )-----------( 202      ( 06 Jun 2024 03:02 )             40.6       06-06    141  |  102  |  7.1<L>  ----------------------------<  82  3.2<L>   |  23.0  |  0.47<L>    Ca    9.2      06 Jun 2024 03:02  Phos  2.7     06-06  Mg     1.6     06-06    TPro  6.4<L>  /  Alb  4.1  /  TBili  0.3<L>  /  DBili  x   /  AST  17  /  ALT  12  /  AlkPhos  89  06-05    TTE W or WO Ultrasound Enhancing Agent (06.06.24 @ 11:19)   CONCLUSIONS:   1. Left ventricular systolic function is normal with an ejection fraction of 56 % by Nolasco's method of disks with an ejection fraction visually estimated at 60 to 65 %.   2. The left ventricular diastolicfunction is indeterminate.   3. Normal right ventricular cavity size and normal systolic function.   4. Normal left and right atrial size.   5. Trace mitral regurgitation.   6. There is moderate calcification of the mitral valve annulus.   7. Trace tricuspid regurgitation.   8. Estimated pulmonary artery systolic pressure is 16 mmHg, consistent with normal pulmonary artery pressure.   9. No pericardial effusion seen.  10. Compared to the transthoracic echocardiogram performed on 1/1/2024, there have been no significant interval changes.    RADIOLOGY & ADDITIONAL STUDIES (independently reviewed unless otherwise noted):  CT Head No Cont (06.05.24 @ 14:52)   IMPRESSION:  No acute intracranial hemorrhage, midline shift or calvarial fracture.

## 2024-06-06 NOTE — BH CONSULTATION LIAISON ASSESSMENT NOTE - PREPARATORY ACTS:
Pt. with two days of fever. Tmax 102.4. Pt. with sore throat x 1 week and weakness. no vomiting or diarrhea. NKA, PMH Leukemia on chemo pills with mediport in place.
None known

## 2024-06-06 NOTE — BH CONSULTATION LIAISON ASSESSMENT NOTE - NSBHCHARTREVIEWINVESTIGATE_PSY_A_CORE FT
< from: 12 Lead ECG (03.26.24 @ 22:14) >      Ventricular Rate 71 BPM    Atrial Rate 71 BPM    P-R Interval 154 ms    QRS Duration 146 ms    Q-T Interval 416 ms    QTC Calculation(Bazett) 452 ms    P Axis 80 degrees    R Axis 71 degrees    T Axis 45 degrees    Diagnosis Line Normal sinus rhythm with sinus arrhythmia  Right bundle branch block  Abnormal ECG    Confirmed by SCOTT FRANCO ([id number]) on 3/27/2024 11:00:22 PM    < end of copied text >    < from: 12 Lead ECG (03.26.24 @ 22:14) >      Ventricular Rate 71 BPM    Atrial Rate 71 BPM    P-R Interval 154 ms    QRS Duration 146 ms    Q-T Interval 416 ms    QTC Calculation(Bazett) 452 ms    P Axis 80 degrees    R Axis 71 degrees    T Axis 45 degrees    Diagnosis Line Normal sinus rhythm with sinus arrhythmia  Right bundle branch block  Abnormal ECG    Confirmed by SCOTT FRANCO ([id number]) on 3/27/2024 11:00:22 PM    < end of copied text >

## 2024-06-06 NOTE — PHYSICAL THERAPY INITIAL EVALUATION ADULT - ADDITIONAL COMMENTS
Pt AxOx4 states she lives at home with partner and son. Pt was independent PTA with use of SAC and owns RW. Pt has aide at home that helps her and partner. Pt without steps inside.

## 2024-06-06 NOTE — H&P ADULT - HISTORY OF PRESENT ILLNESS
72 y/o female with hx of Schizoaffective disorder, DM-2, HLD, HTN, Hypothyroidism, gait disturbance using a cane at baseline, and THC use who lives at home with Partner and his Son as well as an Aide who helps both her and her Partner. History obtained from chart review, Patient, and Partner Akil at [phone]. Patient was in her usual state of health, when she had a syncopal episode at home while in her kitchen. No reported illnesses, sick contacts, or travel. She is not sure what medications she takes, and the Family gave a list to the EMS, however the list is unable to be found at this time, and Dr. Geller showing no medications. She does however deny any changes in her medications and states she takes them as prescribed. No N/V/D, fever, or chills. In the ED she was initially a code stroke due to lack of information. She as moving all 4 extrem equally, noted to have small abrasion above left orbit. CT head negative,  NIH 0 and on further w/u clinical scenario c/w syncope. No evidence of infectious process at this time. Currently at baseline and with no complaints asking to go home. Patient admits to using THC daily,

## 2024-06-06 NOTE — H&P ADULT - NSICDXPASTMEDICALHX_GEN_ALL_CORE_FT
PAST MEDICAL HISTORY:  DM (diabetes mellitus)     HLD (hyperlipidemia)     HTN (hypertension)     Hypothyroid     Manic depression     Schizoaffective disorder

## 2024-06-06 NOTE — BH CONSULTATION LIAISON ASSESSMENT NOTE - HPI (INCLUDE ILLNESS QUALITY, SEVERITY, DURATION, TIMING, CONTEXT, MODIFYING FACTORS, ASSOCIATED SIGNS AND SYMPTOMS)
Patient is a 73 y.o F, lives in [place], , with PMH of HTN, HLD, DM, and Past Psych Hx of schizoaffective disorder, per chart review 1 hx IP Psych in 80's, current cannabis use, no known hx SA or self harm, most recent meds appear to be abilify 15 mg qD, perphenazine 2 mg QHS, unsure if compliant, follows with [facility] for therapy and psychiatry, presented to ER for AMS and syncopal episode.      [facility] Psychiatry consulted for syncopal episode     On interview, pt is calm and cooperative; as soon as greeting interviewer, says "are you Frandy Frey's ?." Pt is alert and oriented x 2 to name and year only. Denies current SI, HI, AVH.     Pt describes events leading to ER admission. Pt states that she had another episode of confusion, and remembers being in the ER at least 2 times before this. She is largely unable to elaborate on what happened, but did provide consent to speak to her family and OP providers for further info. Currently, she does not feel confused, denies SI, AVH.     Pt cites her psychiatric history. Pt is a pt at [facility], had apt this Tuesday. Pt states that she takes her meds and shows her  her medications before she takes them. Agrees with past psych diagnosis of schizoaffective disorder.     Pt cites her social history. Pt lives with her  N[place], also has son that lives nearby.     Pt discusses her substance use. Pt states that she uses a cannabis vape, usually daily, at night to help with sleep.     Brief Psych ROS conducted on pt. Pt agrees with past diagnosis of schizoaffective disorder. Currently denies depressed symptoms, manic symptoms; denies AVH, feelings of paranoia.     Further collateral obtained from pt's partner Edwin at phone # [phone]. He did not witness yesterday's syncopal episode, but says that in the morning of event, pt stated that she wasn't feeling well, went to lie down, and he found her lying on the ground saying strange things. There have been at least 3 previous episode of this, including a time in the card shop where pt was standing and began to feel dizzy and fainted. She does not have LOC, urinary incontinence, or seizure like movements during these episodes. Pt is engaged in OP care with [facility], gets transport to and from apt's, with no med changes at most recent apt on Tuesday. He says that the pt takes her meds, and shows the box to him before she takes them; he does not have reason to believe that the pt has not been taking her meds properly.     By chart review, pt with ER visit in March 2024 for similar presentation, AMS and was also combative, with resolution of pt's symptoms over a few days and discharge from ER.

## 2024-06-06 NOTE — CONSULT NOTE ADULT - NS ATTEND AMEND GEN_ALL_CORE FT
-      73F hx Schizoaffective disorder, DM-2, HTN, gait disturbance using a cane at baseline, and THC use who lives at home with Partner. As per chart notes, patient was in her usual state of health, when she had a syncopal episode at home while in her kitchen. In the ED she was initially a code stroke due to lack of information. Pt was noted to have small abrasion above left orbit. CT head negative. Admits to using THC daily. Denied dizziness, orthopnea, PND, dyspnea, edea, CP, palpitation.    Syncope:   -Presented with syncopal episode and recurrent confusion episode  - Unclear etiology  - EKG unchanged from prior  - Serial CE, EKG, telemetry monitoring  - IVF  - Most likely dehydration or medication related  - Carotid doppler pending  - Orthostatic BP normal off her BP medication.   - PT eval  - OP Cardio FU with [facility]

## 2024-06-06 NOTE — CONSULT NOTE ADULT - PROBLEM SELECTOR RECOMMENDATION 9
74 y/o r, PMH:  Schizoaffective disorder, DM-2, HLD, HTN, Hypothyroidism, gait disturbance using a cane at baseline, and THC use who lives at home.  Presents with syncope - doesn't remember what happened - last thing she remembers was going to bed and then woke up in hospital  Orthostatic negative for change  CTH no acute intracranial hemorrhage, midline shift or calvarial fracture.  Patient hypokalemic on admission  Neuro consult to r/o neurology diease  Continue telemetry  TTE  MCOT or ILF for discharge  Keep potassium > 4 and magnesium > 2   Carotid ultrasound

## 2024-06-06 NOTE — CONSULT NOTE ADULT - SUBJECTIVE AND OBJECTIVE BOX
[facility]                                              CARDIOLOGY AT [address], [place] [address]                                             Telephone: [phone]. Fax:[phone]                                                       CARDIOLOGY CONSULTATION NOTE                                                                                             History obtained by: Patient and medical record  Community Cardiologist:   None   obtained: Yes [  ] No [  ]  Reason for Consultation:   Syncope  Available out pt records reviewed: Yes [  ] No [  ]    Chief complaint:    Patient is a 73y old  Female who presents with a chief complaint of Syncope.        HPI:  72 y/o r, PMH:  Schizoaffective disorder, DM-2, HLD, HTN, Hypothyroidism, gait disturbance using a cane at baseline, and THC use who lives at home with Partner and Son as well as an Aide who helps both her and her Partner.   History obtained from chart review, Patient, and Partner Akil at [phone].   Patient was in her usual state of health, when she had a syncopal episode at home while in her kitchen. No reported illnesses, sick contacts, or travel. She is not sure what medications she takes, and the Family gave a list to the EMS, however the list is unable to be found at this time.   A cardiac consult was called for syncope. In the ED she was initially a code stroke due to lack of information,  CT head negative,   Patient does not see cardiology, former smoker, takes THC nightly to help with sleep, and has no family hisotyr of heart disease  Orthostatic negative for changes.   states she only remembers going to bed last night then woke up in the hospital   Denies headache dizziness, chest pain, palpitations, sob, pnd, orthopnea, hemoptysis, n/v/d/c/abd pain, cramps, weight gain or loss, numbing adn tingling or any other discomfort.          CARDIAC TESTING   ECHO:    TRANSTHORACIC ECHOCARDIOGRAM REPORT  ________________________________________________________________________________                                      _______    Pt. Name:       EDGARDO GRAYSON Study Date:    6/6/2024  MRN:            [id number]        YOB: 1950  Accession #:    [id number]        Age:           73 years  Account#:       [id number]       Gender:        F  Visit ID#  Heart Rate:     73 bpm           Height:        65.00 in (165.10 cm)  Rhythm:         sinus rhythm     Weight:        168.00 lb (76.20 kg)  Blood Pressure: 109/60 mmHg      BSA/BMI:       1.84 m² / 27.96 kg/m²  ________________________________________________________________________________________  Referring Physician:    [id number] Rodger Polk  Interpreting Physician: Donovan Stephens  Primary Sonographer:    Kimberly Cabral    CPT:               ECHO TTE WO CON COMP W DOPP - 33161.m  Indication(s):     Syncope and collapse - R55  Procedure:         Transthoracic echocardiogram with 2-D, M-mode and complete                     spectral and color flow Doppler.  Ordering Location: [facility]  Admission Status:  Inpatient  Study Information: Image quality for this study is fair.  _______________________________________________________________________________________     CONCLUSIONS:      1. Left ventricular systolic function is normal with an ejection fraction of 56 % by Nolasco's method of disks with an ejection fraction visually estimated at 60 to 65 %.   2. The left ventricular diastolic function is indeterminate.   3. Normal right ventricular cavity size and normal systolic function.   4. Normal left and right atrial size.   5. Trace mitral regurgitation.   6. There is moderate calcification of the mitral valve annulus.   7. Trace tricuspid regurgitation.   8. Estimated pulmonary artery systolic pressure is 16 mmHg, consistent with normal pulmonary artery pressure.   9. No pericardial effusion seen.  10. Compared to the transthoracic echocardiogram performed on 1/1/2024, there have been no significant interval changes.    ________________________________________________________________________________________  FINDINGS:     Left Ventricle:  The left ventricular cavity is normal in size. Left ventricular systolic function is normal with a calculated ejection fraction of 56 % by the Nolasco's biplane method of disks. The left ventricular diastolic function is indeterminate. No left ventricular hypertrophy.     Right Ventricle:  The right ventricular cavity is normal in size and normal systolic function. Tricuspid annular plane systolic excursion (TAPSE) is 2.4 cm (normal >=1.7 cm).     Left Atrium:  The left atrium is normal in size with an indexed volume of 24.72 ml/m².     Right Atrium:  The right atrium is normal in size.     Interatrial Septum:  The interatrial septum appears intact.     Aortic Valve:  The aortic valve is tricuspid with normal leaflet excursion with normal systolic excursion. There is calcification of the aortic valve leaflets. There is fibrocalcific aortic valve sclerosis without stenosis.     Mitral Valve:  There is mitral valve thickening of the anterior and posterior leaflets. There is moderate calcification of the mitral valve annulus. There is no mitral valve stenosis. There is trace mitral regurgitation. Mitral inflow is A-wave dominant. Mitral valve mean pressure gradient is 5 mmHg.     Tricuspid Valve:  Structurally normal tricuspid valve with normal leaflet excursion. There is trace tricuspid regurgitation. Estimated pulmonary artery systolic pressure is 16 mmHg, consistent with normal pulmonary artery pressure.     Pulmonic Valve:  The pulmonic valve was not well visualized.     Aorta:  The aortic root at the sinuses of Valsalva is normal in size. The ascending aorta diameter is normal in size.     Pericardium:  No pericardial effusion seen.     Systemic Veins:  The inferior vena cava is normal in size (normal <2.1cm) with normal inspiratory collapse (normal >50%) consistent with normal right atrial pressure (~3, range 0-5mmHg).  ____________________________________________________________________  QUANTITATIVE DATA:  Left Ventricle Measurements: (Indexed to BSA)     IVSd (2D):   1.1 cm  LVPWd (2D):  1.0 cm  LVIDd (2D):  4.2 cm  LVIDs (2D):  2.5 cm  LV Mass:     144 g  78.7 g/m²  LV Vol d, MOD A2C: 75.2 ml 40.94 ml/m²  LV Vol d, MOD A4C: 80.0 ml 43.55 ml/m²  LV Vol d, MOD BP:  79.1 ml 43.06 ml/m²  LV Vol s, MOD A2C: 36.9 ml 20.09 ml/m²  LV Vol s, MOD A4C: 31.1 ml 16.93 ml/m²  LV Vol s, MOD BP:  34.7 ml 18.90 ml/m²  LVOT SV MOD BP:    44.4 ml  LV EF% MOD BP:     56 %     MV E Vmax:    1.35 m/s  MV A Vmax:    1.61 m/s  MV E/A:       0.84  e' lateral:   9.46 cm/s  e' medial:    6.96 cm/s  E/e' lateral: 14.27  E/e' medial:  19.40  E/e' Average: 16.44  MV DT:        232 msec    Aorta Measurements: (Normal range) (Indexed to BSA)     Sinuses of Valsalva: 3.30 cm (2.7 - 3.3 cm)  Ao Asc prox:         3.00 cm     Left Atrium Measurements: (Indexed to BSA)  LA Diam 2D:        2.30 cm  LA Vol s, MOD A4C: 31.60 ml.  LA Vol s, MOD A2C: 64.30 ml.  LA Vol s, MOD BP:  45.40 ml  24.72 ml/m²    Right Ventricle Measurements:     TAPSE: 2.4 cm     LVOT / RVOT/ Qp/Qs Data: (Indexed to BSA)  LVOT Diameter: 2.00 cm  LVOT Area:     3.14 cm²    Aortic Valve Measurements:  AV Vmax:          1.6 m/s  AV Peak Gradient: 10.2 mmHg    Mitral Valve Measurements:     MV Vmax:      1.63 m/s  MV VTI, gaye   0.503 m  MV Mean Grad: 5.0 mmHg  MV Peak Grad: 10.6 mmHg  MV E Vmax:    1.4 m/s  MV A Vmax:    1.6 m/s  MV E/A:       0.8     Tricuspid Valve Measurements:     TR Vmax:          1.8 m/s  TR Peak Gradient: 13.4 mmHg  RA Pressure:      3 mmHg  PASP:             16 mmHg    ________________________________________________________________________________________  Electronically signed on 6/6/2024 at 12:05:04 PM by Donovan Stephens           STRESS:    CATH:     ELECTROPHYSIOLOGY:     PAST MEDICAL HISTORY  DM (diabetes mellitus)  HTN (hypertension)  HLD (hyperlipidemia)  Manic depression  Schizoaffective disorder  Hypothyroid    PAST SURGICAL HISTORY  No significant past surgical history    SOCIAL HISTORY:  Denies smoking/alcohol/drugs  CIGARETTES:   + smoker for > 30 years 1 PPD  ALCOHOL:  DRUGS:  + for THC to help with sleep - 2 gumminess at night.      FAMILY HISTORY:  No pertinent family history in first degree relatives      Family History of Cardiovascular Disease:  Yes [  ] No [  ]  Coronary Artery Disease in first degree relative: Yes [  ] No [  ]  Sudden Cardiac Death in First degree relative: Yes [  ] No [  ]    HOME MEDICATIONS:  Abilify 30 mg oral tablet: 1 tab(s) orally once a day (at bedtime) (06 Jun 2024 01:36)  amLODIPine 5 mg oral tablet: 1 tab(s) orally once a day (06 Jun 2024 01:36)  Jardiance 25 mg oral tablet: 1 tab(s) orally once a day (06 Jun 2024 01:36)  metFORMIN 500 mg oral tablet: 2 tab(s) orally 2 times a day (06 Jun 2024 01:36)  perphenazine 8 mg oral tablet: 1 tab(s) orally once a day (at bedtime) (06 Jun 2024 01:36)      CURRENT CARDIAC MEDICATIONS:      CURRENT OTHER MEDICATIONS:  acetaminophen     Tablet .. 650 milliGRAM(s) Oral every 6 hours PRN Temp greater or equal to 38C (100.4F), Mild Pain (1 - 3)  ARIPiprazole 15 milliGRAM(s) Oral daily  melatonin 3 milliGRAM(s) Oral at bedtime PRN Insomnia  ondansetron Injectable 4 milliGRAM(s) IV Push every 8 hours PRN Nausea and/or Vomiting  aluminum hydroxide/magnesium hydroxide/simethicone Suspension 30 milliLiter(s) Oral every 4 hours PRN Dyspepsia  dextrose 10% Bolus 125 milliLiter(s) IV Bolus once, Stop order after: 1 Doses  dextrose 5%. 1000 milliLiter(s) (100 mL/Hr) IV Continuous <Continuous>  dextrose 5%. 1000 milliLiter(s) (50 mL/Hr) IV Continuous <Continuous>  dextrose 50% Injectable 25 Gram(s) IV Push once, Stop order after: 1 Doses  dextrose 50% Injectable 12.5 Gram(s) IV Push once, Stop order after: 1 Doses  dextrose Oral Gel 15 Gram(s) Oral once, Stop order after: 1 Doses PRN Blood Glucose LESS THAN 70 milliGRAM(s)/deciliter  glucagon  Injectable 1 milliGRAM(s) IntraMuscular once, Stop order after: 1 Doses  heparin   Injectable 5000 Unit(s) SubCutaneous every 12 hours  insulin glargine Injectable (LANTUS) 10 Unit(s) SubCutaneous at bedtime  insulin lispro (ADMELOG) corrective regimen sliding scale   SubCutaneous Before meals and at bedtime  insulin lispro Injectable (ADMELOG) 2 Unit(s) SubCutaneous before breakfast  insulin lispro Injectable (ADMELOG) 2 Unit(s) SubCutaneous before lunch  insulin lispro Injectable (ADMELOG) 2 Unit(s) SubCutaneous before dinner  lactated ringers. 1000 milliLiter(s) (100 mL/Hr) IV Continuous <Continuous>, Stop order after: 20 Hours  sodium chloride 0.9% lock flush 3 milliLiter(s) IV Push every 8 hours  sodium chloride 0.9%. 1000 milliLiter(s) (125 mL/Hr) IV Continuous <Continuous>      ALLERGIES:   Allergy Status Unknown      REVIEW OF SYMPTOMS:   CONSTITUTIONAL: No fever, no chills, no weight loss, no weight gain, no fatigue   ENMT:  No vertigo; No sinus or throat pain  NECK: No pain or stiffness  CARDIOVASCULAR: No chest pain, no dyspnea, no syncope/presyncope, no palpitations, no dizziness, no Orthopnea, no Paroxsymal nocturnal dyspnea  RESPIRATORY: no Shortness of breath, no cough, no wheezing  : No dysuria, no hematuria   GI: No dark color stool, no nausea, no diarrhea, no constipation, no abdominal pain   NEURO: No headache, no slurred speech   MUSCULOSKELETAL: No joint pain or swelling; No muscle, back, or extremity pain  PSYCH: No agitation, no anxiety.    ALL OTHER REVIEW OF SYSTEMS ARE NEGATIVE.    VITAL SIGNS:  T(C): 36.4 (06-06-24 @ 08:12), Max: 36.9 (06-05-24 @ 13:54)  T(F): 97.6 (06-06-24 @ 08:12), Max: 98.5 (06-05-24 @ 13:54)  HR: 75 (06-06-24 @ 11:22) (66 - 86)  BP: 120/62 (06-06-24 @ 11:22) (109/60 - 133/71)  RR: 18 (06-06-24 @ 11:22) (18 - 22)  SpO2: 96% (06-06-24 @ 11:22) (93% - 100%)    INTAKE AND OUTPUT:       PHYSICAL EXAM:  Constitutional: Comfortable . No acute distress.   HEENT: Atraumatic and normocephalic , neck is supple . no JVD. No carotid bruit.  CNS: A&Ox3. No focal deficits.   Respiratory: CTAB, unlabored   Cardiovascular: RRR normal s1 s2. No murmur. No rubs or gallop.  Gastrointestinal: Soft, non-tender. +Bowel sounds.   Extremities: 2+ Peripheral Pulses, No clubbing, cyanosis, or edema  Psychiatric: Calm . no agitation.   Skin: Warm and dry, no ulcers on extremities     LABS:  ( 05 Jun 2024 14:00 )  Troponin T  X    ,  CPK  185<H>, CKMB  X    , BNP X                                  12.4   8.24  )-----------( 202      ( 06 Jun 2024 03:02 )             40.6     06-06    141  |  102  |  7.1<L>  ----------------------------<  82  3.2<L>   |  23.0  |  0.47<L>    Ca    9.2      06 Jun 2024 03:02  Phos  2.7     06-06  Mg     1.6     06-06    TPro  6.4<L>  /  Alb  4.1  /  TBili  0.3<L>  /  DBili  x   /  AST  17  /  ALT  12  /  AlkPhos  89  06-05      Urinalysis Basic - ( 06 Jun 2024 03:02 )    Color: x / Appearance: x / SG: x / pH: x  Gluc: 82 mg/dL / Ketone: x  / Bili: x / Urobili: x   Blood: x / Protein: x / Nitrite: x   Leuk Esterase: x / RBC: x / WBC x   Sq Epi: x / Non Sq Epi: x / Bacteria: x          Thyroid Stimulating Hormone, Serum: 2.06 uIU/mL (06-06-24 @ 03:02)      INTERPRETATION OF TELEMETRY:     ECG:

## 2024-06-07 VITALS
DIASTOLIC BLOOD PRESSURE: 70 MMHG | RESPIRATION RATE: 18 BRPM | SYSTOLIC BLOOD PRESSURE: 155 MMHG | TEMPERATURE: 98 F | HEART RATE: 83 BPM | OXYGEN SATURATION: 100 %

## 2024-06-07 LAB
A1C WITH ESTIMATED AVERAGE GLUCOSE RESULT: 5.6 % — SIGNIFICANT CHANGE UP (ref 4–5.6)
ANION GAP SERPL CALC-SCNC: 14 MMOL/L — SIGNIFICANT CHANGE UP (ref 5–17)
BUN SERPL-MCNC: 17.5 MG/DL — SIGNIFICANT CHANGE UP (ref 8–20)
CALCIUM SERPL-MCNC: 9.2 MG/DL — SIGNIFICANT CHANGE UP (ref 8.4–10.5)
CHLORIDE SERPL-SCNC: 103 MMOL/L — SIGNIFICANT CHANGE UP (ref 96–108)
CO2 SERPL-SCNC: 23 MMOL/L — SIGNIFICANT CHANGE UP (ref 22–29)
CREAT SERPL-MCNC: 0.42 MG/DL — LOW (ref 0.5–1.3)
EGFR: 103 ML/MIN/1.73M2 — SIGNIFICANT CHANGE UP
ESTIMATED AVERAGE GLUCOSE: 114 MG/DL — SIGNIFICANT CHANGE UP (ref 68–114)
GLUCOSE BLDC GLUCOMTR-MCNC: 128 MG/DL — HIGH (ref 70–99)
GLUCOSE BLDC GLUCOMTR-MCNC: 137 MG/DL — HIGH (ref 70–99)
GLUCOSE SERPL-MCNC: 104 MG/DL — HIGH (ref 70–99)
MAGNESIUM SERPL-MCNC: 1.9 MG/DL — SIGNIFICANT CHANGE UP (ref 1.6–2.6)
POTASSIUM SERPL-MCNC: 3.3 MMOL/L — LOW (ref 3.5–5.3)
POTASSIUM SERPL-SCNC: 3.3 MMOL/L — LOW (ref 3.5–5.3)
SODIUM SERPL-SCNC: 140 MMOL/L — SIGNIFICANT CHANGE UP (ref 135–145)
VIT B12 SERPL-MCNC: >2000 PG/ML — HIGH (ref 232–1245)

## 2024-06-07 PROCEDURE — 96374 THER/PROPH/DIAG INJ IV PUSH: CPT

## 2024-06-07 PROCEDURE — 83735 ASSAY OF MAGNESIUM: CPT

## 2024-06-07 PROCEDURE — 93306 TTE W/DOPPLER COMPLETE: CPT

## 2024-06-07 PROCEDURE — 84443 ASSAY THYROID STIM HORMONE: CPT

## 2024-06-07 PROCEDURE — 99239 HOSP IP/OBS DSCHRG MGMT >30: CPT

## 2024-06-07 PROCEDURE — 87086 URINE CULTURE/COLONY COUNT: CPT

## 2024-06-07 PROCEDURE — 82553 CREATINE MB FRACTION: CPT

## 2024-06-07 PROCEDURE — 93880 EXTRACRANIAL BILAT STUDY: CPT

## 2024-06-07 PROCEDURE — 83036 HEMOGLOBIN GLYCOSYLATED A1C: CPT

## 2024-06-07 PROCEDURE — 80053 COMPREHEN METABOLIC PANEL: CPT

## 2024-06-07 PROCEDURE — 71045 X-RAY EXAM CHEST 1 VIEW: CPT

## 2024-06-07 PROCEDURE — 36415 COLL VENOUS BLD VENIPUNCTURE: CPT

## 2024-06-07 PROCEDURE — 81001 URINALYSIS AUTO W/SCOPE: CPT

## 2024-06-07 PROCEDURE — 80048 BASIC METABOLIC PNL TOTAL CA: CPT

## 2024-06-07 PROCEDURE — 93005 ELECTROCARDIOGRAM TRACING: CPT

## 2024-06-07 PROCEDURE — 82962 GLUCOSE BLOOD TEST: CPT

## 2024-06-07 PROCEDURE — 85027 COMPLETE CBC AUTOMATED: CPT

## 2024-06-07 PROCEDURE — 70450 CT HEAD/BRAIN W/O DYE: CPT | Mod: MC

## 2024-06-07 PROCEDURE — 84484 ASSAY OF TROPONIN QUANT: CPT

## 2024-06-07 PROCEDURE — 99285 EMERGENCY DEPT VISIT HI MDM: CPT

## 2024-06-07 PROCEDURE — 80307 DRUG TEST PRSMV CHEM ANLYZR: CPT

## 2024-06-07 PROCEDURE — 93880 EXTRACRANIAL BILAT STUDY: CPT | Mod: 26

## 2024-06-07 PROCEDURE — 85025 COMPLETE CBC W/AUTO DIFF WBC: CPT

## 2024-06-07 PROCEDURE — 82607 VITAMIN B-12: CPT

## 2024-06-07 PROCEDURE — 84100 ASSAY OF PHOSPHORUS: CPT

## 2024-06-07 PROCEDURE — 82550 ASSAY OF CK (CPK): CPT

## 2024-06-07 RX ORDER — AMLODIPINE BESYLATE 2.5 MG/1
5 TABLET ORAL DAILY
Refills: 0 | Status: DISCONTINUED | OUTPATIENT
Start: 2024-06-07 | End: 2024-06-07

## 2024-06-07 RX ORDER — MAGNESIUM OXIDE 400 MG ORAL TABLET 241.3 MG
1 TABLET ORAL
Qty: 30 | Refills: 0
Start: 2024-06-07 | End: 2024-07-06

## 2024-06-07 RX ORDER — POTASSIUM CHLORIDE 20 MEQ
40 PACKET (EA) ORAL
Refills: 0 | Status: DISCONTINUED | OUTPATIENT
Start: 2024-06-07 | End: 2024-06-07

## 2024-06-07 RX ORDER — PERPHENAZINE 8 MG/1
1 TABLET, FILM COATED ORAL
Refills: 0 | DISCHARGE

## 2024-06-07 RX ORDER — AMLODIPINE BESYLATE 2.5 MG/1
1 TABLET ORAL
Refills: 0 | DISCHARGE

## 2024-06-07 RX ORDER — MAGNESIUM OXIDE 400 MG ORAL TABLET 241.3 MG
400 TABLET ORAL DAILY
Refills: 0 | Status: DISCONTINUED | OUTPATIENT
Start: 2024-06-07 | End: 2024-06-07

## 2024-06-07 RX ORDER — POTASSIUM CHLORIDE 20 MEQ
1 PACKET (EA) ORAL
Qty: 2 | Refills: 0
Start: 2024-06-07 | End: 2024-06-08

## 2024-06-07 RX ORDER — POTASSIUM CHLORIDE 20 MEQ
20 PACKET (EA) ORAL ONCE
Refills: 0 | Status: COMPLETED | OUTPATIENT
Start: 2024-06-07 | End: 2024-06-07

## 2024-06-07 RX ORDER — POTASSIUM CHLORIDE 20 MEQ
10 PACKET (EA) ORAL
Refills: 0 | Status: COMPLETED | OUTPATIENT
Start: 2024-06-07 | End: 2024-06-07

## 2024-06-07 RX ORDER — MAGNESIUM SULFATE 500 MG/ML
1 VIAL (ML) INJECTION ONCE
Refills: 0 | Status: COMPLETED | OUTPATIENT
Start: 2024-06-07 | End: 2024-06-07

## 2024-06-07 RX ADMIN — Medication 100 MILLIEQUIVALENT(S): at 14:20

## 2024-06-07 RX ADMIN — Medication 100 MILLIEQUIVALENT(S): at 12:08

## 2024-06-07 RX ADMIN — SODIUM CHLORIDE 3 MILLILITER(S): 9 INJECTION INTRAMUSCULAR; INTRAVENOUS; SUBCUTANEOUS at 05:18

## 2024-06-07 RX ADMIN — AMLODIPINE BESYLATE 5 MILLIGRAM(S): 2.5 TABLET ORAL at 12:12

## 2024-06-07 RX ADMIN — MAGNESIUM OXIDE 400 MG ORAL TABLET 400 MILLIGRAM(S): 241.3 TABLET ORAL at 12:10

## 2024-06-07 RX ADMIN — ARIPIPRAZOLE 30 MILLIGRAM(S): 15 TABLET ORAL at 11:03

## 2024-06-07 RX ADMIN — Medication 100 MILLIEQUIVALENT(S): at 15:51

## 2024-06-07 RX ADMIN — SODIUM CHLORIDE 3 MILLILITER(S): 9 INJECTION INTRAMUSCULAR; INTRAVENOUS; SUBCUTANEOUS at 12:41

## 2024-06-07 RX ADMIN — Medication 2 UNIT(S): at 09:52

## 2024-06-07 RX ADMIN — Medication 20 MILLIEQUIVALENT(S): at 12:11

## 2024-06-07 RX ADMIN — Medication 100 GRAM(S): at 12:07

## 2024-06-07 RX ADMIN — HEPARIN SODIUM 5000 UNIT(S): 5000 INJECTION INTRAVENOUS; SUBCUTANEOUS at 05:16

## 2024-06-07 NOTE — DISCHARGE NOTE NURSING/CASE MANAGEMENT/SOCIAL WORK - NSDCPEFALRISK_GEN_ALL_CORE
For information on Fall & Injury Prevention, visit: [email or web address] OR  [email or web address] OR  https://www.cdc.gov/steadi/patient.html

## 2024-06-07 NOTE — PROGRESS NOTE ADULT - ASSESSMENT
72 y/o female with syncopal episode at home, Hx of Schizoaffective disorder, HTN, HLD, DM-2, Hypothyroidism, THC use, she states  that she uses a cannabis vape, usually daily, at night to help with sleep.     1-Syncope   recurrent   reports use of cannabis as documented by  cronic use   CT of head negative  C doppler no stenosis   cardiology follow up no events on tele   follow up in the office for [facility]OT   d/w DR Nicole no neurology work up needed     2- Hypokalemia   replace Po k and IV prior DC     3-Schiozoaffective disorder    consult appreciated   follow up with psyc as per routine [place] tuesday     4-HTN   meds held will resume amlodipine   no orthostatic hypotension   lwo salt diet         
72 y/o female with syncopal episode at home, Hx of Schizoaffective disorder, HTN, HLD, DM-2, Hypothyroidism, THC use    # Recurrent Syncope:  #Recurrent metabolic encephalopathy  -Presented with syncopal episode and recurrent confusion episode  -Most likely dehydration or medication side effect  -TTE relatively normal. Cardiology recommended outpatient work up  -Neurology consulted  -Carotid doppler pending  -Continue IV fluid  -Orthostatitc BP normal off her BP medication. Will hold further  -PT evaluation pending    Schizoaffective disorder:  -Resume home Abilify  - Held perfenasin  -Denies SI/HI  -Evaluated by psychiatrist Dr. Mcdonald.Recommended to continue home medication and outpatient follow up.    HTN:  -HOld home meds. Normotensive off medication  -DASH diet    HLD:  -Cardiac diet     DM-2:  -Hold PO meds  -Basal/bolus regimen  -ADA diet     Hypothyroidism:  -Resume synthroid     THC use:  -Counseled on cessation     Discussed with patient's daughter on phone. She is concerned about recurrent episode. She agreed with plan of care    Dispo- home vs [facility] as per PT evaluation

## 2024-06-07 NOTE — DISCHARGE NOTE PROVIDER - HOSPITAL COURSE
74 y/o female with syncopal episode at home, Hx of Schizoaffective disorder, HTN, HLD, DM-2, Hypothyroidism, THC use, she states  that she uses a cannabis vape, usually daily, at night to help with sleep. Pt was placed on tele , no events . C doppler neg , TTE normal EF   Cardio rec Dc and follow up in the office for MOCT , neurology follow up as needed for further testing dementia ? daughter is concerned about   Pt had low K supplemented prior DC   Pt is also seen by psychiatry  rec DC perphenezine , cont  abilify and see psychiatrist   daughter is informed in details   referral to neurology cardiology and verbal follow up instructions given to daughter

## 2024-06-07 NOTE — PROGRESS NOTE ADULT - SUBJECTIVE AND OBJECTIVE BOX
[facility] Division of Hospital Medicine  Rodger Polk MD   I'm reachable on [employer] Teams      Patient is a 73y old  Female who presents with a chief complaint of Syncope (06 Jun 2024 13:25)      SUBJECTIVE / OVERNIGHT EVENTS:   Patient was seen and examined during rounds  -MOre alert and oriented today. She reports she is feeling better. She was able to recall some part of episode.  -She denied any new headache, weakness, tingling, or numbness      12 points ROS negative except above    MEDICATIONS  (STANDING):  ARIPiprazole 15 milliGRAM(s) Oral daily  dextrose 10% Bolus 125 milliLiter(s) IV Bolus once  dextrose 5%. 1000 milliLiter(s) (100 mL/Hr) IV Continuous <Continuous>  dextrose 5%. 1000 milliLiter(s) (50 mL/Hr) IV Continuous <Continuous>  dextrose 50% Injectable 25 Gram(s) IV Push once  dextrose 50% Injectable 12.5 Gram(s) IV Push once  glucagon  Injectable 1 milliGRAM(s) IntraMuscular once  heparin   Injectable 5000 Unit(s) SubCutaneous every 12 hours  insulin glargine Injectable (LANTUS) 10 Unit(s) SubCutaneous at bedtime  insulin lispro (ADMELOG) corrective regimen sliding scale   SubCutaneous Before meals and at bedtime  insulin lispro Injectable (ADMELOG) 2 Unit(s) SubCutaneous before dinner  insulin lispro Injectable (ADMELOG) 2 Unit(s) SubCutaneous before breakfast  insulin lispro Injectable (ADMELOG) 2 Unit(s) SubCutaneous before lunch  lactated ringers. 1000 milliLiter(s) (100 mL/Hr) IV Continuous <Continuous>  sodium chloride 0.9% lock flush 3 milliLiter(s) IV Push every 8 hours  sodium chloride 0.9%. 1000 milliLiter(s) (125 mL/Hr) IV Continuous <Continuous>    MEDICATIONS  (PRN):  acetaminophen     Tablet .. 650 milliGRAM(s) Oral every 6 hours PRN Temp greater or equal to 38C (100.4F), Mild Pain (1 - 3)  aluminum hydroxide/magnesium hydroxide/simethicone Suspension 30 milliLiter(s) Oral every 4 hours PRN Dyspepsia  dextrose Oral Gel 15 Gram(s) Oral once PRN Blood Glucose LESS THAN 70 milliGRAM(s)/deciliter  melatonin 3 milliGRAM(s) Oral at bedtime PRN Insomnia  ondansetron Injectable 4 milliGRAM(s) IV Push every 8 hours PRN Nausea and/or Vomiting        I&O's Summary      PHYSICAL EXAM:  Vital Signs Last 24 Hrs  T(C): 36.4 (06 Jun 2024 08:12), Max: 36.9 (05 Jun 2024 19:26)  T(F): 97.6 (06 Jun 2024 08:12), Max: 98.5 (05 Jun 2024 19:26)  HR: 75 (06 Jun 2024 11:22) (66 - 86)  BP: 120/62 (06 Jun 2024 11:22) (109/60 - 128/78)  BP(mean): 94 (06 Jun 2024 01:20) (94 - 94)  RR: 18 (06 Jun 2024 11:22) (18 - 21)  SpO2: 96% (06 Jun 2024 11:22) (96% - 100%)    Parameters below as of 06 Jun 2024 11:22  Patient On (Oxygen Delivery Method): room air        CONSTITUTIONAL: NAD, Not in acute distress  EYES:  EOMI, conjunctiva and sclera clear  ENMT: , neck supple , non-tender  RESPIRATORY: Normal respiratory effort; lungs are clear to auscultation bilaterally  CARDIOVASCULAR: S1S2 present  ABDOMEN: soft. bowel sound present  MUSCLOSKELETAL: ; no clubbing or cyanosis of digits;   PSYCH: A+O to person, place, and time; affect appropriate  NEUROLOGY: CN, motor and sensory intact   SKIN: No rashes; no palpable lesions  Extremity: No bilateral edema      LABS:                        12.4   8.24  )-----------( 202      ( 06 Jun 2024 03:02 )             40.6     06-06    141  |  102  |  7.1<L>  ----------------------------<  82  3.2<L>   |  23.0  |  0.47<L>    Ca    9.2      06 Jun 2024 03:02  Phos  2.7     06-06  Mg     1.6     06-06    TPro  6.4<L>  /  Alb  4.1  /  TBili  0.3<L>  /  DBili  x   /  AST  17  /  ALT  12  /  AlkPhos  89  06-05      CARDIAC MARKERS ( 05 Jun 2024 14:00 )  x     / x     / 185 U/L / x     / 7.8 ng/mL      Urinalysis Basic - ( 06 Jun 2024 03:02 )    Color: x / Appearance: x / SG: x / pH: x  Gluc: 82 mg/dL / Ketone: x  / Bili: x / Urobili: x   Blood: x / Protein: x / Nitrite: x   Leuk Esterase: x / RBC: x / WBC x   Sq Epi: x / Non Sq Epi: x / Bacteria: x        CAPILLARY BLOOD GLUCOSE      POCT Blood Glucose.: 107 mg/dL (06 Jun 2024 12:11)  POCT Blood Glucose.: 92 mg/dL (06 Jun 2024 09:24)      Labs reviewed:    RADIOLOGY & ADDITIONAL TESTS:  Imaging Personally Reviewed:  Electrocardiogram Personally Reviewed:  
Patient is a 73y old  Female who presents with a chief complaint of Syncope (06 Jun 2024 16:58)      Patient seen  in am , she is resting in the bed   no complaints , no events on tele   d/w cardiology NP , pt is stable to go after K supplementation   for outpatient MOCT     ALLERGIES:  Allergy Status Unknown    MEDICATIONS  (STANDING):  ARIPiprazole 30 milliGRAM(s) Oral daily  dextrose 10% Bolus 125 milliLiter(s) IV Bolus once  dextrose 5%. 1000 milliLiter(s) (50 mL/Hr) IV Continuous <Continuous>  dextrose 5%. 1000 milliLiter(s) (100 mL/Hr) IV Continuous <Continuous>  dextrose 50% Injectable 25 Gram(s) IV Push once  dextrose 50% Injectable 12.5 Gram(s) IV Push once  glucagon  Injectable 1 milliGRAM(s) IntraMuscular once  heparin   Injectable 5000 Unit(s) SubCutaneous every 12 hours  insulin glargine Injectable (LANTUS) 10 Unit(s) SubCutaneous at bedtime  insulin lispro (ADMELOG) corrective regimen sliding scale   SubCutaneous Before meals and at bedtime  insulin lispro Injectable (ADMELOG) 2 Unit(s) SubCutaneous before breakfast  insulin lispro Injectable (ADMELOG) 2 Unit(s) SubCutaneous before lunch  insulin lispro Injectable (ADMELOG) 2 Unit(s) SubCutaneous before dinner  lactated ringers. 1000 milliLiter(s) (100 mL/Hr) IV Continuous <Continuous>  magnesium oxide 400 milliGRAM(s) Oral daily  magnesium sulfate  IVPB 1 Gram(s) IV Intermittent once  potassium chloride    Tablet ER 20 milliEquivalent(s) Oral once  potassium chloride  10 mEq/100 mL IVPB 10 milliEquivalent(s) IV Intermittent every 1 hour  sodium chloride 0.9% lock flush 3 milliLiter(s) IV Push every 8 hours  sodium chloride 0.9%. 1000 milliLiter(s) (125 mL/Hr) IV Continuous <Continuous>    MEDICATIONS  (PRN):  acetaminophen     Tablet .. 650 milliGRAM(s) Oral every 6 hours PRN Temp greater or equal to 38C (100.4F), Mild Pain (1 - 3)  aluminum hydroxide/magnesium hydroxide/simethicone Suspension 30 milliLiter(s) Oral every 4 hours PRN Dyspepsia  dextrose Oral Gel 15 Gram(s) Oral once PRN Blood Glucose LESS THAN 70 milliGRAM(s)/deciliter  melatonin 3 milliGRAM(s) Oral at bedtime PRN Insomnia  ondansetron Injectable 4 milliGRAM(s) IV Push every 8 hours PRN Nausea and/or Vomiting    Vital Signs Last 24 Hrs  T(F): 98.5 (07 Jun 2024 11:13), Max: 98.6 (07 Jun 2024 07:40)  HR: 83 (07 Jun 2024 11:13) (73 - 88)  BP: 155/70 (07 Jun 2024 11:13) (126/71 - 155/70)  RR: 18 (07 Jun 2024 11:13) (18 - 18)  SpO2: 100% (07 Jun 2024 11:13) (95% - 100%)  I&O's Summary      PHYSICAL EXAM:  General: awake alert   ENT: normal oral mucosa   Neck: supple , no JVD   Lungs: CTA bilateral   Cardio: RRR, S1/S2, No murmur  Abdomen: Soft, Nontender, Nondistended; Bowel sounds present  Extremities: No calf tenderness, No pitting edema    LABS:                        12.4   8.24  )-----------( 202      ( 06 Jun 2024 03:02 )             40.6     06-07    140  |  103  |  17.5  ----------------------------<  104  3.3   |  23.0  |  0.42    Ca    9.2      07 Jun 2024 04:05  Phos  2.7     06-06  Mg     1.9     06-07    TPro  6.4  /  Alb  4.1  /  TBili  0.3  /  DBili  x   /  AST  17  /  ALT  12  /  AlkPhos  89  06-05                CARDIAC MARKERS ( 05 Jun 2024 14:00 )  x     / x     / 185 U/L / x     / 7.8 ng/mL        TSH 2.06   TSH with FT4 reflex --  Total T3 --              POCT Blood Glucose.: 128 mg/dL (07 Jun 2024 09:31)  POCT Blood Glucose.: 142 mg/dL (06 Jun 2024 22:08)  POCT Blood Glucose.: 137 mg/dL (06 Jun 2024 16:54)  POCT Blood Glucose.: 107 mg/dL (06 Jun 2024 12:11)      Urinalysis Basic - ( 07 Jun 2024 04:05 )    Color: x / Appearance: x / SG: x / pH: x  Gluc: 104 mg/dL / Ketone: x  / Bili: x / Urobili: x   Blood: x / Protein: x / Nitrite: x   Leuk Esterase: x / RBC: x / WBC x   Sq Epi: x / Non Sq Epi: x / Bacteria: x        Culture - Urine (collected 05 Jun 2024 15:00)  Source: Catheterized Catheterized  Final Report (06 Jun 2024 18:24):    No growth        RADIOLOGY & ADDITIONAL TESTS:

## 2024-06-07 NOTE — DISCHARGE NOTE PROVIDER - CARE PROVIDER_API CALL
Kojo Nicole  Neurology  [address] [facility] [address]  [place] [address]  Phone: [phone]  Fax: [phone]  Follow Up Time: 2 weeks    Joshua Marks  Cardiovascular Disease  [address] [address]  Phone: [phone]  Fax: [phone]  Follow Up Time: 2 weeks

## 2024-06-07 NOTE — DISCHARGE NOTE PROVIDER - PROVIDER TOKENS
PROVIDER:[TOKEN:[[id number]],FOLLOWUP:[2 weeks]],PROVIDER:[TOKEN:[[id number]],FOLLOWUP:[2 weeks]]

## 2024-06-07 NOTE — DISCHARGE NOTE PROVIDER - CARE PROVIDERS DIRECT ADDRESSES
[email or web address]

## 2024-06-07 NOTE — DISCHARGE NOTE PROVIDER - NSDCCPCAREPLAN_GEN_ALL_CORE_FT
PRINCIPAL DISCHARGE DIAGNOSIS  Diagnosis: Altered mental status  Assessment and Plan of Treatment: at baseline , avoid vaping use cannabis , see neurologist      SECONDARY DISCHARGE DIAGNOSES  Diagnosis: Syncope  Assessment and Plan of Treatment: follow up with cardiologist to get event monitor at home    Diagnosis: Hypokalemia  Assessment and Plan of Treatment: replaced    Diagnosis: Schizoaffective disorder  Assessment and Plan of Treatment: take abilify , see your doctor next week    Diagnosis: Hypertension  Assessment and Plan of Treatment: continue home meds    Diagnosis: Diabetes mellitus  Assessment and Plan of Treatment: watch diet

## 2024-06-07 NOTE — DISCHARGE NOTE NURSING/CASE MANAGEMENT/SOCIAL WORK - PATIENT PORTAL LINK FT
You can access the FollowMyHealth Patient Portal offered by [facility] by registering at the following website: [email or web address]. By joining [facility]’s FollowMyHealth portal, you will also be able to view your health information using other applications (apps) compatible with our system.

## 2024-06-07 NOTE — CHART NOTE - NSCHARTNOTEFT_GEN_A_CORE
RILEY Note:  SW was informed that pt stated she does not have transportation for dc back home for today 6/7. SW assisted pt with calling her daughter and significant other for pick however, no transport can be provided. Pt stated her address: [address] [place] [address]. RILEY called and set up transport via [facility] for a taxi  at 6pm. Reference #[id number]. Taxi vendor is [employer] ([phone]). Pt to be waiting in ED lobby for . RN and pt made aware of the same.

## 2024-06-07 NOTE — DISCHARGE NOTE PROVIDER - NSDCMRMEDTOKEN_GEN_ALL_CORE_FT
Abilify 30 mg oral tablet: 1 tab(s) orally once a day (at bedtime)  aspirin 81 mg oral delayed release tablet: 1 tab(s) orally once a day  atorvastatin 80 mg oral tablet: 1 tab(s) orally once a day (at bedtime)  clopidogrel 75 mg oral tablet: 1 tab(s) orally once a day  Jardiance 25 mg oral tablet: 1 tab(s) orally once a day  levothyroxine 25 mcg (0.025 mg) oral tablet: 1 tab(s) orally once a day  magnesium oxide 400 mg oral tablet: 1 tab(s) orally once a day  metFORMIN 500 mg oral tablet: 2 tab(s) orally 2 times a day  potassium chloride 20 mEq oral tablet, extended release: 1 tab(s) orally once a day start tomorrow 6/8

## 2024-12-05 NOTE — DISCHARGE NOTE PROVIDER - POSTFACE STATEMENT FOR MINUTES SPENT
Chaperone for Intimate Exam     Chaperone was offer accepted as part of the rooming process    Chaperone: Yara Mayen      Patient here for lower intermittent pelvic pain that started approx 3 weeks ago. Patient denies dysuria, discharge, odor, itching, or burning.     Patient states she had a possible yeast infection 2-3 weeks ago and used monistat 7 day but is unsure if it was helpful.     Patient would like nuswab to check.     LAST PAP:  5/16/2023, neg.     LAST MAMMO:  never     LMP:  No LMP recorded (lmp unknown). (Menstrual status: Chemically Induced).    BIRTH CONTROL:  OCP (estrogen/progesterone)-takes continuous     TOBACCO USE:  No-vapes     FAMILY HISTORY OF:   Breast Cancer:  No   Ovarian Cancer:  No   Uterine Cancer:  No   Colon Cancer:  No    Vitals:    12/05/24 0905   BP: 122/68   Site: Right Upper Arm   Position: Sitting   Weight: 63.5 kg (140 lb)   Height: 1.588 m (5' 2.5\")        MILDRED MURRAY RN  12/05/24  9:12 AM     Topics Concern    Not on file   Social History Narrative    Patient states feeling safe at home, denies abuse.      Social Determinants of Health     Financial Resource Strain: Low Risk  (5/21/2024)    Overall Financial Resource Strain (CARDIA)     Difficulty of Paying Living Expenses: Not hard at all   Food Insecurity: No Food Insecurity (5/21/2024)    Hunger Vital Sign     Worried About Running Out of Food in the Last Year: Never true     Ran Out of Food in the Last Year: Never true   Transportation Needs: Unknown (5/21/2024)    PRAPARE - Transportation     Lack of Transportation (Medical): Not on file     Lack of Transportation (Non-Medical): No   Physical Activity: Not on file   Stress: Not on file   Social Connections: Unknown (3/19/2021)    Received from [facility]    Social Connections     Frequency of Communication with Friends and Family: Not asked     Frequency of Social Gatherings with Friends and Family: Not asked   Intimate Partner Violence: Unknown (3/19/2021)    Received from [facility]    Intimate Partner Violence     Fear of Current or Ex-Partner: Not asked     Emotionally Abused: Not asked     Physically Abused: Not asked     Sexually Abused: Not asked   Housing Stability: Unknown (5/21/2024)    Housing Stability Vital Sign     Unable to Pay for Housing in the Last Year: Not on file     Number of Places Lived in the Last Year: Not on file     Unstable Housing in the Last Year: No           Allergies   Allergen Reactions    Latex Rash    Prednisone Rash and Shortness Of Breath    Montelukast Other (See Comments)     Blood in urine             Review of Systems    Constitutional:  No fevers or chills    Neuro:  No headaches    HEENT: no visual changes, bleeding gums    CV: No chest pain or palpitations    Resp: No SOB or cough    Breast:  No masses, pain, D/C, bleeding    GI:  No nausea/vomiting/diarrhea/constipation    : No dysuria or hematuria    MS:  No back,  minutes on the discharge service.

## 2024-12-21 NOTE — ED PROVIDER NOTE - IV ALTEPLASE EXCL REL HIDDEN
show

## 2025-03-05 NOTE — ED PROVIDER NOTE - NS ED MD DISPO SPECIAL CONSIDERATION1
Detail Level: Zone Photo Preface (Leave Blank If You Do Not Want): Photo(s) taken today for future reference None

## 2025-03-27 ENCOUNTER — NON-APPOINTMENT (OUTPATIENT)
Age: 75
End: 2025-03-27

## 2025-03-27 PROBLEM — Z00.00 ENCOUNTER FOR PREVENTIVE HEALTH EXAMINATION: Status: ACTIVE | Noted: 2025-03-27

## 2025-05-21 NOTE — ED ADULT NURSE NOTE - CHIEF COMPLAINT
Addended by: JYOTI HECTOR on: 5/21/2025 04:20 PM     Modules accepted: Orders    
The patient is a 73y Female complaining of altered mental status.

## 2025-06-13 NOTE — ED BEHAVIORAL HEALTH ASSESSMENT NOTE - NSBHMSEIMPULSE_PSY_A_CORE
